# Patient Record
Sex: FEMALE | Race: WHITE | NOT HISPANIC OR LATINO | Employment: PART TIME | URBAN - NONMETROPOLITAN AREA
[De-identification: names, ages, dates, MRNs, and addresses within clinical notes are randomized per-mention and may not be internally consistent; named-entity substitution may affect disease eponyms.]

---

## 2017-01-17 ENCOUNTER — OFFICE VISIT (OUTPATIENT)
Dept: OBSTETRICS AND GYNECOLOGY | Facility: CLINIC | Age: 22
End: 2017-01-17

## 2017-01-17 ENCOUNTER — PROCEDURE VISIT (OUTPATIENT)
Dept: OBSTETRICS AND GYNECOLOGY | Facility: CLINIC | Age: 22
End: 2017-01-17

## 2017-01-17 VITALS
SYSTOLIC BLOOD PRESSURE: 100 MMHG | DIASTOLIC BLOOD PRESSURE: 74 MMHG | WEIGHT: 119 LBS | HEIGHT: 64 IN | BODY MASS INDEX: 20.32 KG/M2

## 2017-01-17 DIAGNOSIS — F17.200 SMOKER: ICD-10-CM

## 2017-01-17 DIAGNOSIS — O03.9 SAB (SPONTANEOUS ABORTION): Primary | ICD-10-CM

## 2017-01-17 DIAGNOSIS — Z67.91 RH NEGATIVE STATUS DURING PREGNANCY, FIRST TRIMESTER: ICD-10-CM

## 2017-01-17 DIAGNOSIS — O26.891 RH NEGATIVE STATUS DURING PREGNANCY, FIRST TRIMESTER: ICD-10-CM

## 2017-01-17 DIAGNOSIS — O02.1 MISSED AB: Primary | ICD-10-CM

## 2017-01-17 LAB — HCG INTACT+B SERPL-ACNC: 219.04 MIU/ML (ref 0–10)

## 2017-01-17 PROCEDURE — 76817 TRANSVAGINAL US OBSTETRIC: CPT | Performed by: OBSTETRICS & GYNECOLOGY

## 2017-01-17 PROCEDURE — 96372 THER/PROPH/DIAG INJ SC/IM: CPT | Performed by: OBSTETRICS & GYNECOLOGY

## 2017-01-17 PROCEDURE — 99203 OFFICE O/P NEW LOW 30 MIN: CPT | Performed by: OBSTETRICS & GYNECOLOGY

## 2017-01-17 NOTE — MR AVS SNAPSHOT
Urbano Pineda   2017 10:30 AM   Appointment    Dept Phone:  [phone]   Encounter #:  [id number]    Provider:  ULTRASOUND 1 DOC DYE   Department:  [facility]                Your Full Care Plan              Your Updated Medication List      Notice  As of 2017 10:43 AM    You have not been prescribed any medications.            Instructions     None    Patient Instructions History      Upcoming Appointments     Visit Type Date Time Department    [facility] 2017 10:30 [facility]    [facility] 2017 10:45 AM [facility]      [employer] Signup     [facility] RevolutionCredit allows you to send messages to your doctor, view your test results, renew your prescriptions, schedule appointments, and more. To sign up, go to Conversion Sound and click on the Sign Up Now link in the New User? box. Enter your Revolution[employer] Activation Code exactly as it appears below along with the last four digits of your Social Security Number and your Date of Birth () to complete the sign-up process. If you do not sign up before the expiration date, you must request a new code.    RevolutionCredit Activation Code: [email or web address]  Expires: 2017 10:43 AM    If you have questions, you can email [email or web address] or call [phone] to talk to our Revolution[employer] staff. Remember, RevolutionCredit is NOT to be used for urgent needs. For medical emergencies, dial 911.               Other Info from Your Visit           Your Appointments     2017 10:45 AM CST   NEW OBSTETRICS with Lucas Mckenzie MD   [facility] (--)    [address] [place] [address] [address]  [facility] [id number] [phone]              Allergies     Not on File

## 2017-01-17 NOTE — MR AVS SNAPSHOT
Urbano Pineda   2017 10:45 AM   Office Visit    Dept Phone:  [phone]   Encounter #:  [id number]    Provider:  Lucas Mckenzie MD   Department:  [facility]                Your Full Care Plan              Your Updated Medication List      Notice  As of 2017 11:36 AM    You have not been prescribed any medications.            We Performed the Following     ABO / Rh     Antibody Screen     CBC & Differential     HCG, B-subunit, Quantitative     Hepatitis B Surface Antigen     HIV-1 / O / 2 Ag / Antibody 4th Generation     Rhogam Immune Globulin Immunization     RPR     Rubella Antibody, IgG       You Were Diagnosed With        Codes Comments    SAB (spontaneous )    -  Primary ICD-10-CM: O03.9  ICD-9-CM: 634.90 Will follow HCG    Rh negative status during pregnancy, first trimester     ICD-10-CM: O09.891  ICD-9-CM: V23.89     Smoker     ICD-10-CM: F17.200  ICD-9-CM: 305.1       Instructions     None    Patient Instructions History      Upcoming Appointments     Visit Type Date Time Department    OB ULTRASOUND 2017 10:30 AM [facility]    NEW GYNECOLOGICAL 2017 10:45 AM [facility]    OFFICE VISIT 2017  1:15 PM [facility]      Social IQ (Social Influence Quotient) Signup     [facility] Social IQ (Social Influence Quotient) allows you to send messages to your doctor, view your test results, renew your prescriptions, schedule appointments, and more. To sign up, go to [employer] and click on the Sign Up Now link in the New User? box. Enter your Social IQ (Social Influence Quotient) Activation Code exactly as it appears below along with the last four digits of your Social Security Number and your Date of Birth () to complete the sign-up process. If you do not sign up before the expiration date, you must request a new code.    Social IQ (Social Influence Quotient) Activation Code: [email or web address]  Expires: 2017 10:43 AM    If you have questions, you can email [email or web address] or call [phone]  "to talk to our MyChart staff. Remember, MyChart is NOT to be used for urgent needs. For medical emergencies, dial 911.               Other Info from Your Visit           Your Appointments     Jan 31, 2017  1:15 PM CST   Office Visit with Lucas Mckenzie MD   [facility] (--)    [address] [address] [phone]           Arrive 15 minutes prior to appointment.              Allergies     No Known Allergies      Reason for Visit     Transitional Care Management New pt. Was scheduled as a NOB. No IUP noted on u/s report.      Vital Signs     Blood Pressure Height Weight Last Menstrual Period Breastfeeding? Body Mass Index    100/74 64\" (162.6 cm) 119 lb (54 kg) 11/14/2016 (Approximate) No 20.43 kg/m2    Smoking Status                   Current Every Day Smoker           Problems and Diagnoses Noted     Miscarriage    -  Primary    Rh negative status during pregnancy        Smoker          Immunizations Administered     Name Date    Rho (D) Immune Globulin         "

## 2017-01-17 NOTE — PROGRESS NOTES
Subjective   Urbano Pineda is a 21 y.o. female is here today as a self referral.  Had LMP in mid November.  12-12-16 took home UPT which was positive.  Went to PCP on 12-13 with confirmation blood work.  No issues until this weekend when she started bleeding heavily with passage of clots and tissue which has decreased.  Some cramping but midline in location and not sharp or severe.  Did not receive Rhogam this weekend. Known to be Rh -.  U/S today shows normal uterus and empty uterus.    History of Present Illness    The following portions of the patient's history were reviewed and updated as appropriate: allergies, current medications, past family history, past medical history, past social history, past surgical history and problem list.    Review of Systems   Constitutional: Negative for fever and unexpected weight change.   HENT: Negative for postnasal drip, rhinorrhea and sinus pressure.    Eyes: Negative for photophobia and visual disturbance.   Respiratory: Negative for cough, choking and chest tightness.    Cardiovascular: Negative for chest pain and leg swelling.   Gastrointestinal: Negative for constipation, diarrhea, nausea and vomiting.   Endocrine: Negative for cold intolerance and heat intolerance.   Genitourinary: Positive for vaginal bleeding. Negative for menstrual problem and pelvic pain.   Musculoskeletal: Negative for back pain and gait problem.   Neurological: Negative for light-headedness, numbness and headaches.   Psychiatric/Behavioral: Negative for decreased concentration and dysphoric mood.       Objective   Physical Exam   Constitutional: She appears well-developed and well-nourished.   Neck: Normal range of motion. Neck supple.   Cardiovascular: Normal rate.    Pulmonary/Chest: Effort normal.   Abdominal: Soft.   Skin: Skin is warm and dry.   Psychiatric: She has a normal mood and affect. Her behavior is normal. Judgment and thought content normal.   Vitals  reviewed.        Assessment/Plan   Urbano was seen today for transitional care management.    Diagnoses and all orders for this visit:    SAB (spontaneous )  Comments:  Will follow HCG  Orders:  -     ABO / Rh  -     Antibody Screen  -     CBC & Differential  -     Hepatitis B Surface Antigen  -     HIV-1 / O / 2 Ag / Antibody 4th Generation  -     RPR  -     Rubella Antibody, IgG  -     HCG, B-subunit, Quantitative    Rh negative status during pregnancy, first trimester  -     Rhogam Immune Globulin Immunization    Smoker      Lucas Mckenzie MD

## 2017-01-18 DIAGNOSIS — Z34.91 FIRST TRIMESTER PREGNANCY: Primary | ICD-10-CM

## 2017-01-18 LAB
ABO GROUP BLD: NORMAL
BASOPHILS # BLD AUTO: 0 X10E3/UL (ref 0–0.2)
BASOPHILS NFR BLD AUTO: 1 %
BLD GP AB SCN SERPL QL: NEGATIVE
EOSINOPHIL # BLD AUTO: 0.2 X10E3/UL (ref 0–0.4)
EOSINOPHIL NFR BLD AUTO: 4 %
ERYTHROCYTE [DISTWIDTH] IN BLOOD BY AUTOMATED COUNT: 13.6 % (ref 12.3–15.4)
HBV SURFACE AG SERPL QL IA: NEGATIVE
HCT VFR BLD AUTO: 40.7 % (ref 34–46.6)
HGB BLD-MCNC: 13.7 G/DL (ref 11.1–15.9)
HIV 1+2 AB+HIV1 P24 AG SERPL QL IA: NON REACTIVE
IMM GRANULOCYTES # BLD: 0 X10E3/UL (ref 0–0.1)
IMM GRANULOCYTES NFR BLD: 0 %
LYMPHOCYTES # BLD AUTO: 2.8 X10E3/UL (ref 0.7–3.1)
LYMPHOCYTES NFR BLD AUTO: 44 %
MCH RBC QN AUTO: 29.8 PG (ref 26.6–33)
MCHC RBC AUTO-ENTMCNC: 33.7 G/DL (ref 31.5–35.7)
MCV RBC AUTO: 89 FL (ref 79–97)
MONOCYTES # BLD AUTO: 0.5 X10E3/UL (ref 0.1–0.9)
MONOCYTES NFR BLD AUTO: 9 %
NEUTROPHILS # BLD AUTO: 2.6 X10E3/UL (ref 1.4–7)
NEUTROPHILS NFR BLD AUTO: 42 %
PLATELET # BLD AUTO: 245 X10E3/UL (ref 150–379)
RBC # BLD AUTO: 4.59 X10E6/UL (ref 3.77–5.28)
RH BLD: NEGATIVE
RPR SER QL: NON REACTIVE
RUBV IGG SERPL IA-ACNC: 1.45 INDEX
WBC # BLD AUTO: 6.2 X10E3/UL (ref 3.4–10.8)

## 2017-01-19 ENCOUNTER — RESULTS ENCOUNTER (OUTPATIENT)
Dept: OBSTETRICS AND GYNECOLOGY | Facility: CLINIC | Age: 22
End: 2017-01-19

## 2017-01-19 DIAGNOSIS — Z34.91 FIRST TRIMESTER PREGNANCY: ICD-10-CM

## 2017-01-31 ENCOUNTER — OFFICE VISIT (OUTPATIENT)
Dept: OBSTETRICS AND GYNECOLOGY | Facility: CLINIC | Age: 22
End: 2017-01-31

## 2017-01-31 VITALS
WEIGHT: 120 LBS | DIASTOLIC BLOOD PRESSURE: 80 MMHG | SYSTOLIC BLOOD PRESSURE: 100 MMHG | BODY MASS INDEX: 20.49 KG/M2 | HEIGHT: 64 IN

## 2017-01-31 DIAGNOSIS — O26.891 RH NEGATIVE STATUS DURING PREGNANCY IN FIRST TRIMESTER, ANTEPARTUM: ICD-10-CM

## 2017-01-31 DIAGNOSIS — O03.9 SAB (SPONTANEOUS ABORTION): Primary | ICD-10-CM

## 2017-01-31 DIAGNOSIS — Z67.91 RH NEGATIVE STATUS DURING PREGNANCY IN FIRST TRIMESTER, ANTEPARTUM: ICD-10-CM

## 2017-01-31 DIAGNOSIS — O03.9 SPONTANEOUS ABORTION WITHOUT COMPLICATION: Primary | ICD-10-CM

## 2017-01-31 PROCEDURE — 99213 OFFICE O/P EST LOW 20 MIN: CPT | Performed by: OBSTETRICS & GYNECOLOGY

## 2017-01-31 NOTE — PROGRESS NOTES
Subjective   Urbano Pineda is a 21 y.o. female is here today for follow-up.  Had 7-10 day s of bleeding but has now resolved.  Passed some clots and tissue.  No fevers    Miscarriage   This is a new problem. The current episode started more than 1 month ago. The problem occurs intermittently. The problem has been resolved. Pertinent negatives include no abdominal pain, anorexia, arthralgias, change in bowel habit, chest pain, chills, congestion, coughing, diaphoresis, fatigue, fever, headaches, joint swelling, myalgias, nausea, neck pain, numbness, rash, sore throat, swollen glands, urinary symptoms, vertigo, visual change, vomiting or weakness. Nothing aggravates the symptoms. She has tried nothing for the symptoms. The treatment provided significant relief.       The following portions of the patient's history were reviewed and updated as appropriate: allergies, current medications, past family history, past medical history, past social history, past surgical history and problem list.    Review of Systems   Constitutional: Negative for chills, diaphoresis, fatigue and fever.   HENT: Negative for congestion and sore throat.    Eyes: Negative for photophobia and visual disturbance.   Respiratory: Negative for cough.    Cardiovascular: Negative for chest pain.   Gastrointestinal: Negative for abdominal pain, anorexia, change in bowel habit, nausea and vomiting.   Genitourinary: Negative for pelvic pain and vaginal bleeding.   Musculoskeletal: Negative for arthralgias, joint swelling, myalgias and neck pain.   Skin: Negative for rash.   Neurological: Negative for vertigo, weakness, numbness and headaches.   Psychiatric/Behavioral: Negative for confusion, decreased concentration and dysphoric mood.       Objective   Physical Exam   Constitutional: She is oriented to person, place, and time. She appears well-developed and well-nourished.   Neck: Normal range of motion. Neck supple.   Cardiovascular: Normal rate.     Pulmonary/Chest: Effort normal.   Abdominal: Soft.   Neurological: She is alert and oriented to person, place, and time.   Skin: Skin is warm and dry.   Psychiatric: She has a normal mood and affect. Her behavior is normal. Judgment and thought content normal.   Nursing note and vitals reviewed.        Assessment/Plan   Urbano was seen today for miscarriage.    Diagnoses and all orders for this visit:    SAB (spontaneous )  -     hCG, Quantitative, Pregnancy    Rh negative status during pregnancy in first trimester, antepartum  Comments:  Received rhogam at last appt        Lucas Mckenzie MD

## 2017-01-31 NOTE — MR AVS SNAPSHOT
Urbano Pineda   2017 1:15 PM   Office Visit    Dept Phone:  [phone]   Encounter #:  [id number]    Provider:  Lucas Mckenzie MD   Department:  [facility]                Your Full Care Plan              Your Updated Medication List      Notice  As of 2017  1:33 PM    You have not been prescribed any medications.            We Performed the Following     hCG, Quantitative, Pregnancy       You Were Diagnosed With        Codes Comments    SAB (spontaneous )    -  Primary ICD-10-CM: O03.9  ICD-9-CM: 634.90     Rh negative status during pregnancy in first trimester, antepartum     ICD-10-CM: O09.891  ICD-9-CM: V23.89 Received rhogam at last appt      Instructions     None    Patient Instructions History      Upcoming Appointments     Visit Type Date Time Department    OFFICE VISIT 2017  1:15 PM [facility]    OFFICE VISIT 2017 11:00 AM [facility]      [facility] Signup     Our records indicate that you have an active [employer] account.    You can view your After Visit Summary by going to Elastic Intelligence and logging in with your [employer] username and password.  If you don't have a [employer] username and password but a parent or guardian has access to your record, the parent or guardian should login with their own [employer] username and password and access your record to view the After Visit Summary.    If you have questions, you can email [email or web address] or call [phone] to talk to our [employer] staff.  Remember, [employer] is NOT to be used for urgent needs.  For medical emergencies, dial 911.               Other Info from Your Visit           Your Appointments     2017 11:00 AM CDT   Office Visit with Lucas Mckenzie MD   [facility] (--)    [address] [address]   [phone]           Arrive 15 minutes prior to appointment.               "  Allergies     No Known Allergies      Reason for Visit     Miscarriage Pt has stopped bleeding and cramping      Vital Signs     Blood Pressure Height Weight Last Menstrual Period Breastfeeding? Body Mass Index    100/80 64\" (162.6 cm) 120 lb (54.4 kg) 11/14/2016 (Approximate) No 20.6 kg/m2    Smoking Status                   Current Every Day Smoker           Problems and Diagnoses Noted     Miscarriage    -  Primary    Rh negative status during pregnancy in first trimester, antepartum            "

## 2017-02-01 LAB — HCG INTACT+B SERPL-ACNC: 2.74 MIU/ML (ref 0–10)

## 2017-04-26 ENCOUNTER — OFFICE VISIT (OUTPATIENT)
Dept: OBSTETRICS AND GYNECOLOGY | Facility: CLINIC | Age: 22
End: 2017-04-26

## 2017-04-26 VITALS
WEIGHT: 121 LBS | BODY MASS INDEX: 20.66 KG/M2 | DIASTOLIC BLOOD PRESSURE: 70 MMHG | HEIGHT: 64 IN | SYSTOLIC BLOOD PRESSURE: 100 MMHG

## 2017-04-26 DIAGNOSIS — F17.200 SMOKER: Primary | ICD-10-CM

## 2017-04-26 DIAGNOSIS — Z31.69 ENCOUNTER FOR PRECONCEPTION CONSULTATION: ICD-10-CM

## 2017-04-26 PROCEDURE — 99213 OFFICE O/P EST LOW 20 MIN: CPT | Performed by: OBSTETRICS & GYNECOLOGY

## 2017-04-26 NOTE — PROGRESS NOTES
Subjective   Urbano Pineda is a 21 y.o. female is here today for follow-up.  Had SAB without D&C 3 months ago.  Menses regular and not problematic.  Continues to smoke.  Not taking PNV as advised.  Desires pregnancy    Menstrual Problem   This is a chronic problem. The current episode started more than 1 year ago. The problem occurs intermittently. The problem has been unchanged. Pertinent negatives include no abdominal pain, anorexia, arthralgias, change in bowel habit, chest pain, chills, congestion, coughing, diaphoresis, fatigue, fever, headaches, joint swelling, myalgias, nausea, neck pain, numbness, rash, sore throat, swollen glands, urinary symptoms, vertigo, visual change, vomiting or weakness. Nothing aggravates the symptoms. She has tried nothing for the symptoms. The treatment provided no relief.       The following portions of the patient's history were reviewed and updated as appropriate: allergies, current medications, past family history, past medical history, past social history, past surgical history and problem list.    Review of Systems   Constitutional: Negative for chills, diaphoresis, fatigue and fever.   HENT: Negative for congestion and sore throat.    Eyes: Negative for photophobia and visual disturbance.   Respiratory: Negative for cough.    Cardiovascular: Negative for chest pain.   Gastrointestinal: Negative for abdominal pain, anorexia, change in bowel habit, nausea and vomiting.   Endocrine: Negative for cold intolerance and heat intolerance.   Genitourinary: Positive for menstrual problem. Negative for dyspareunia and vaginal bleeding.   Musculoskeletal: Negative for arthralgias, joint swelling, myalgias and neck pain.   Skin: Negative for rash.   Neurological: Negative for vertigo, weakness, numbness and headaches.   Psychiatric/Behavioral: Negative for confusion, decreased concentration and dysphoric mood.       Objective   Physical Exam   Constitutional: She is oriented to person,  place, and time. She appears well-developed and well-nourished.   HENT:   Head: Normocephalic and atraumatic.   Neck: Normal range of motion. Neck supple. No tracheal deviation present. No thyromegaly present.   Cardiovascular: Normal rate and regular rhythm.    Pulmonary/Chest: Effort normal and breath sounds normal.   Abdominal: Soft. Bowel sounds are normal. She exhibits no distension. There is no tenderness.   Musculoskeletal: Normal range of motion.   Neurological: She is alert and oriented to person, place, and time.   Skin: Skin is warm and dry.   Psychiatric: She has a normal mood and affect. Her behavior is normal. Judgment and thought content normal.   Nursing note and vitals reviewed.        Assessment/Plan   Urbano was seen today for follow-up.    Diagnoses and all orders for this visit:    Smoker    Encounter for preconception consultation  Comments:  recommend ovulation predictor kit timed intercourse.  Encouraged PNV.

## 2017-07-07 ENCOUNTER — INITIAL PRENATAL (OUTPATIENT)
Dept: OBSTETRICS AND GYNECOLOGY | Facility: CLINIC | Age: 22
End: 2017-07-07

## 2017-07-07 ENCOUNTER — APPOINTMENT (OUTPATIENT)
Dept: LAB | Facility: HOSPITAL | Age: 22
End: 2017-07-07

## 2017-07-07 VITALS — WEIGHT: 119 LBS | DIASTOLIC BLOOD PRESSURE: 52 MMHG | SYSTOLIC BLOOD PRESSURE: 104 MMHG | BODY MASS INDEX: 20.43 KG/M2

## 2017-07-07 DIAGNOSIS — Z3A.00 WEEKS OF GESTATION OF PREGNANCY NOT SPECIFIED: ICD-10-CM

## 2017-07-07 DIAGNOSIS — O99.331 TOBACCO USE IN PREGNANCY, ANTEPARTUM, FIRST TRIMESTER: ICD-10-CM

## 2017-07-07 DIAGNOSIS — O09.11: Primary | ICD-10-CM

## 2017-07-07 DIAGNOSIS — Z32.01 PREGNANCY EXAMINATION OR TEST, POSITIVE RESULT: ICD-10-CM

## 2017-07-07 PROBLEM — O99.330 TOBACCO USE IN PREGNANCY, ANTEPARTUM: Status: ACTIVE | Noted: 2017-07-07

## 2017-07-07 LAB
ABO GROUP BLD: NORMAL
AMPHET+METHAMPHET UR QL: NEGATIVE
BACTERIA UR QL AUTO: ABNORMAL /HPF
BARBITURATES UR QL SCN: NEGATIVE
BASOPHILS # BLD AUTO: 0.03 10*3/MM3 (ref 0–0.2)
BASOPHILS NFR BLD AUTO: 0.5 % (ref 0–2)
BENZODIAZ UR QL SCN: NEGATIVE
BILIRUB UR QL STRIP: NEGATIVE
BLD GP AB SCN SERPL QL: NEGATIVE
CANNABINOIDS SERPL QL: NEGATIVE
CLARITY UR: ABNORMAL
COCAINE UR QL: NEGATIVE
COLOR UR: YELLOW
DEPRECATED RDW RBC AUTO: 40.4 FL (ref 36.4–46.3)
EOSINOPHIL # BLD AUTO: 0.08 10*3/MM3 (ref 0–0.7)
EOSINOPHIL NFR BLD AUTO: 1.3 % (ref 0–7)
ERYTHROCYTE [DISTWIDTH] IN BLOOD BY AUTOMATED COUNT: 12.5 % (ref 11.5–14.5)
GLUCOSE UR STRIP-MCNC: NEGATIVE MG/DL
HBV SURFACE AG SERPL QL IA: NEGATIVE
HCG INTACT+B SERPL-ACNC: 1603.4 MIU/ML
HCT VFR BLD AUTO: 40 % (ref 35–45)
HCV AB SER DONR QL: NEGATIVE
HGB BLD-MCNC: 13.7 G/DL (ref 12–15.5)
HGB UR QL STRIP.AUTO: NEGATIVE
HIV1+2 AB SER QL: NEGATIVE
HYALINE CASTS UR QL AUTO: ABNORMAL /LPF
IMM GRANULOCYTES # BLD: 0.01 10*3/MM3 (ref 0–0.02)
IMM GRANULOCYTES NFR BLD: 0.2 % (ref 0–0.5)
KETONES UR QL STRIP: NEGATIVE
LEUKOCYTE ESTERASE UR QL STRIP.AUTO: ABNORMAL
LYMPHOCYTES # BLD AUTO: 2 10*3/MM3 (ref 0.6–4.2)
LYMPHOCYTES NFR BLD AUTO: 31.7 % (ref 10–50)
Lab: NORMAL
MCH RBC QN AUTO: 29.9 PG (ref 26.5–34)
MCHC RBC AUTO-ENTMCNC: 34.3 G/DL (ref 31.4–36)
MCV RBC AUTO: 87.3 FL (ref 80–98)
METHADONE UR QL SCN: NEGATIVE
MONOCYTES # BLD AUTO: 0.51 10*3/MM3 (ref 0–0.9)
MONOCYTES NFR BLD AUTO: 8.1 % (ref 0–12)
NEUTROPHILS # BLD AUTO: 3.67 10*3/MM3 (ref 2–8.6)
NEUTROPHILS NFR BLD AUTO: 58.2 % (ref 37–80)
NITRITE UR QL STRIP: POSITIVE
OPIATES UR QL: NEGATIVE
OXYCODONE UR QL SCN: NEGATIVE
PH UR STRIP.AUTO: 5.5 [PH] (ref 5–9)
PLATELET # BLD AUTO: 212 10*3/MM3 (ref 150–450)
PMV BLD AUTO: 10.9 FL (ref 8–12)
PROT UR QL STRIP: NEGATIVE
RBC # BLD AUTO: 4.58 10*6/MM3 (ref 3.77–5.16)
RBC # UR: ABNORMAL /HPF
REF LAB TEST METHOD: ABNORMAL
RH BLD: NEGATIVE
RUBV IGG SER QL: ABNORMAL
RUBV IGG SER-ACNC: 17.9 IU/ML (ref 0–9.9)
SP GR UR STRIP: 1.02 (ref 1–1.03)
SQUAMOUS #/AREA URNS HPF: ABNORMAL /HPF
UROBILINOGEN UR QL STRIP: ABNORMAL
WBC NRBC COR # BLD: 6.3 10*3/MM3 (ref 3.2–9.8)
WBC UR QL AUTO: ABNORMAL /HPF

## 2017-07-07 PROCEDURE — 87077 CULTURE AEROBIC IDENTIFY: CPT | Performed by: ADVANCED PRACTICE MIDWIFE

## 2017-07-07 PROCEDURE — 87186 SC STD MICRODIL/AGAR DIL: CPT | Performed by: ADVANCED PRACTICE MIDWIFE

## 2017-07-07 PROCEDURE — 80307 DRUG TEST PRSMV CHEM ANLYZR: CPT | Performed by: ADVANCED PRACTICE MIDWIFE

## 2017-07-07 PROCEDURE — 86900 BLOOD TYPING SEROLOGIC ABO: CPT | Performed by: ADVANCED PRACTICE MIDWIFE

## 2017-07-07 PROCEDURE — 84702 CHORIONIC GONADOTROPIN TEST: CPT | Performed by: ADVANCED PRACTICE MIDWIFE

## 2017-07-07 PROCEDURE — 87340 HEPATITIS B SURFACE AG IA: CPT | Performed by: ADVANCED PRACTICE MIDWIFE

## 2017-07-07 PROCEDURE — 81001 URINALYSIS AUTO W/SCOPE: CPT | Performed by: ADVANCED PRACTICE MIDWIFE

## 2017-07-07 PROCEDURE — G0432 EIA HIV-1/HIV-2 SCREEN: HCPCS | Performed by: ADVANCED PRACTICE MIDWIFE

## 2017-07-07 PROCEDURE — 85025 COMPLETE CBC W/AUTO DIFF WBC: CPT | Performed by: ADVANCED PRACTICE MIDWIFE

## 2017-07-07 PROCEDURE — 87086 URINE CULTURE/COLONY COUNT: CPT | Performed by: ADVANCED PRACTICE MIDWIFE

## 2017-07-07 PROCEDURE — 87491 CHLMYD TRACH DNA AMP PROBE: CPT | Performed by: ADVANCED PRACTICE MIDWIFE

## 2017-07-07 PROCEDURE — 0501F PRENATAL FLOW SHEET: CPT | Performed by: ADVANCED PRACTICE MIDWIFE

## 2017-07-07 PROCEDURE — 86901 BLOOD TYPING SEROLOGIC RH(D): CPT | Performed by: ADVANCED PRACTICE MIDWIFE

## 2017-07-07 PROCEDURE — 87591 N.GONORRHOEAE DNA AMP PROB: CPT | Performed by: ADVANCED PRACTICE MIDWIFE

## 2017-07-07 PROCEDURE — 86850 RBC ANTIBODY SCREEN: CPT | Performed by: ADVANCED PRACTICE MIDWIFE

## 2017-07-07 PROCEDURE — 86803 HEPATITIS C AB TEST: CPT | Performed by: ADVANCED PRACTICE MIDWIFE

## 2017-07-07 PROCEDURE — 36415 COLL VENOUS BLD VENIPUNCTURE: CPT | Performed by: ADVANCED PRACTICE MIDWIFE

## 2017-07-07 RX ORDER — PRENATAL VIT/IRON FUM/FOLIC AC 65 MG-1 MG
1 TABLET ORAL DAILY
Qty: 30 EACH | Refills: 12 | Status: SHIPPED | OUTPATIENT
Start: 2017-07-07 | End: 2017-09-27 | Stop reason: SDUPTHER

## 2017-07-07 NOTE — PROGRESS NOTES
CC: new OB visit, history obtained and reviewed see history tabs.     ROS:Positive occasional nausea   Negative leaking fluid from the vagina, swelling in her legs, headache, visual changes, low back pain, heartburn     Educated on:Spent 30 minutes out of 45 minutes face to face counseling on nutrition, activity, diet, safety, prenatal care, medications approved in pregnancy, pregnancy discomforts, and testing. Counseled on quitting smoking, reviewed s/s of miscarriage and ectopic pregnancy. Patient verbalized understanding       Plan: f/u in 3 week/s with dating US

## 2017-07-09 LAB
BACTERIA SPEC AEROBE CULT: ABNORMAL
C TRACH RRNA CVX QL NAA+PROBE: NOT DETECTED
N GONORRHOEA RRNA SPEC QL NAA+PROBE: NOT DETECTED
RPR SER QL: NORMAL

## 2017-07-09 RX ORDER — CEPHALEXIN 250 MG/1
250 CAPSULE ORAL 4 TIMES DAILY
Qty: 28 CAPSULE | Refills: 0 | Status: SHIPPED | OUTPATIENT
Start: 2017-07-09 | End: 2017-07-16

## 2017-07-31 ENCOUNTER — ROUTINE PRENATAL (OUTPATIENT)
Dept: OBSTETRICS AND GYNECOLOGY | Facility: CLINIC | Age: 22
End: 2017-07-31

## 2017-07-31 VITALS — SYSTOLIC BLOOD PRESSURE: 112 MMHG | BODY MASS INDEX: 19.91 KG/M2 | DIASTOLIC BLOOD PRESSURE: 61 MMHG | WEIGHT: 116 LBS

## 2017-07-31 DIAGNOSIS — Z3A.00 WEEKS OF GESTATION OF PREGNANCY NOT SPECIFIED: ICD-10-CM

## 2017-07-31 DIAGNOSIS — Z34.81 PRENATAL CARE, SUBSEQUENT PREGNANCY, FIRST TRIMESTER: Primary | ICD-10-CM

## 2017-07-31 LAB
BILIRUB UR QL STRIP: NEGATIVE
CLARITY UR: ABNORMAL
COLOR UR: YELLOW
GLUCOSE UR STRIP-MCNC: NEGATIVE MG/DL
HGB UR QL STRIP.AUTO: NEGATIVE
KETONES UR QL STRIP: NEGATIVE
LEUKOCYTE ESTERASE UR QL STRIP.AUTO: NEGATIVE
NITRITE UR QL STRIP: NEGATIVE
PH UR STRIP.AUTO: 8 [PH] (ref 5–9)
PROT UR QL STRIP: NEGATIVE
SP GR UR STRIP: 1.02 (ref 1–1.03)
UROBILINOGEN UR QL STRIP: ABNORMAL

## 2017-07-31 PROCEDURE — 81003 URINALYSIS AUTO W/O SCOPE: CPT | Performed by: ADVANCED PRACTICE MIDWIFE

## 2017-07-31 PROCEDURE — 87086 URINE CULTURE/COLONY COUNT: CPT | Performed by: ADVANCED PRACTICE MIDWIFE

## 2017-07-31 PROCEDURE — 0502F SUBSEQUENT PRENATAL CARE: CPT | Performed by: ADVANCED PRACTICE MIDWIFE

## 2017-07-31 RX ORDER — ONDANSETRON 4 MG/1
4 TABLET, FILM COATED ORAL EVERY 8 HOURS PRN
Qty: 20 TABLET | Refills: 3 | Status: SHIPPED | OUTPATIENT
Start: 2017-07-31 | End: 2017-08-30

## 2017-07-31 NOTE — PROGRESS NOTES
CC: LAURA visit, history reviewed see history tabs.     ROS:Positive nausea   Negative leaking fluid from the vagina, swelling in her legs, headache, visual changes, low back pain and heartburn    Educated on:prenatal care, reviewed labs and US, zofran rx sent to pharmacy, educated on nutrition    Plan: f/u in 4 week/s   YANETH- urine collected

## 2017-08-02 LAB — BACTERIA SPEC AEROBE CULT: NORMAL

## 2017-08-30 ENCOUNTER — ROUTINE PRENATAL (OUTPATIENT)
Dept: OBSTETRICS AND GYNECOLOGY | Facility: CLINIC | Age: 22
End: 2017-08-30

## 2017-08-30 VITALS — WEIGHT: 115 LBS | BODY MASS INDEX: 19.74 KG/M2 | DIASTOLIC BLOOD PRESSURE: 65 MMHG | SYSTOLIC BLOOD PRESSURE: 103 MMHG

## 2017-08-30 DIAGNOSIS — Z36.89 ENCOUNTER FOR FETAL ANATOMIC SURVEY: ICD-10-CM

## 2017-08-30 DIAGNOSIS — O09.11: ICD-10-CM

## 2017-08-30 DIAGNOSIS — Z3A.13 13 WEEKS GESTATION OF PREGNANCY: ICD-10-CM

## 2017-08-30 DIAGNOSIS — O99.331 TOBACCO USE IN PREGNANCY, ANTEPARTUM, FIRST TRIMESTER: Primary | ICD-10-CM

## 2017-08-30 LAB
BILIRUB UR QL STRIP: NEGATIVE
CLARITY UR: ABNORMAL
COLOR UR: YELLOW
GLUCOSE UR STRIP-MCNC: NEGATIVE MG/DL
HGB UR QL STRIP.AUTO: NEGATIVE
KETONES UR QL STRIP: NEGATIVE
LEUKOCYTE ESTERASE UR QL STRIP.AUTO: NEGATIVE
NITRITE UR QL STRIP: NEGATIVE
PH UR STRIP.AUTO: 6.5 [PH] (ref 5–9)
PROT UR QL STRIP: NEGATIVE
SP GR UR STRIP: 1.03 (ref 1–1.03)
UROBILINOGEN UR QL STRIP: ABNORMAL

## 2017-08-30 PROCEDURE — 87086 URINE CULTURE/COLONY COUNT: CPT | Performed by: ADVANCED PRACTICE MIDWIFE

## 2017-08-30 PROCEDURE — 0502F SUBSEQUENT PRENATAL CARE: CPT | Performed by: ADVANCED PRACTICE MIDWIFE

## 2017-08-30 PROCEDURE — 81003 URINALYSIS AUTO W/O SCOPE: CPT | Performed by: ADVANCED PRACTICE MIDWIFE

## 2017-08-30 NOTE — PROGRESS NOTES
CC: LAURA visit, history reviewed see history tabs.     ROS: Negative leaking fluid from the vagina, swelling in her legs, headache, visual changes, low back pain and heartburn      Educated on:POC     A/Plan: f/u in 5 week/s   Urbano was seen today for routine prenatal visit.    Diagnoses and all orders for this visit:    Tobacco use in pregnancy, antepartum, first trimester    Pregnancy in first trimester with history of ectopic pregnancy, antepartum    13 weeks gestation of pregnancy

## 2017-08-31 LAB — BACTERIA SPEC AEROBE CULT: NORMAL

## 2017-09-25 ENCOUNTER — TELEPHONE (OUTPATIENT)
Dept: OBSTETRICS AND GYNECOLOGY | Facility: CLINIC | Age: 22
End: 2017-09-25

## 2017-09-25 NOTE — TELEPHONE ENCOUNTER
Pt cancelled return OB appt with Farzaneh on 10/4 via Sputnik[employer] and has an appt scheduled on 9/27 with an APRN in [place] for OB care. Wanted to confirm the location since she's been seeing Farzaneh throughout her pregnancy. No voicemail set up for either number.

## 2017-09-27 ENCOUNTER — INITIAL PRENATAL (OUTPATIENT)
Dept: OBSTETRICS AND GYNECOLOGY | Facility: CLINIC | Age: 22
End: 2017-09-27

## 2017-09-27 VITALS — WEIGHT: 119 LBS | SYSTOLIC BLOOD PRESSURE: 96 MMHG | DIASTOLIC BLOOD PRESSURE: 54 MMHG | BODY MASS INDEX: 20.43 KG/M2

## 2017-09-27 DIAGNOSIS — Z34.82 ENCOUNTER FOR SUPERVISION OF OTHER NORMAL PREGNANCY IN SECOND TRIMESTER: Primary | ICD-10-CM

## 2017-09-27 PROCEDURE — 0501F PRENATAL FLOW SHEET: CPT | Performed by: NURSE PRACTITIONER

## 2017-09-27 RX ORDER — VITAMIN A ACETATE, .BETA.-CAROTENE, ASCORBIC ACID, CHOLECALCIFEROL, .ALPHA.-TOCOPHEROL ACETATE, DL-, THIAMINE MONONITRATE, RIBOFLAVIN, NIACINAMIDE, PYRIDOXINE HYDROCHLORIDE, FOLIC ACID, CYANOCOBALAMIN, CALCIUM CARBONATE, FERROUS FUMARATE, ZINC OXIDE, AND CUPRIC OXIDE 2000; 2000; 120; 400; 22; 1.84; 3; 20; 10; 1; 12; 200; 27; 25; 2 [IU]/1; [IU]/1; MG/1; [IU]/1; MG/1; MG/1; MG/1; MG/1; MG/1; MG/1; UG/1; MG/1; MG/1; MG/1; MG/1
TABLET ORAL
Refills: 12 | COMMUNITY
Start: 2017-07-07 | End: 2018-04-17

## 2017-09-27 NOTE — PROGRESS NOTES
This is patient's 3 rd pregnancy. She has had one miscarriage and one ectopic. She does smoke 1/2 ppd down from a ppd. This pregnancy has been uneventful. Will have G&D with next OV in 3 weeks  Has had intermittent low pelvic pain that radiates to her back occasionally. No problems with urination.

## 2017-09-27 NOTE — PATIENT INSTRUCTIONS
Second Trimester of Pregnancy  The second trimester is from week 13 through week 28, months 4 through 6. The second trimester is often a time when you feel your best. Your body has also adjusted to being pregnant, and you begin to feel better physically. Usually, morning sickness has lessened or quit completely, you may have more energy, and you may have an increase in appetite. The second trimester is also a time when the fetus is growing rapidly. At the end of the sixth month, the fetus is about 9 inches long and weighs about 1½ pounds. You will likely begin to feel the baby move (quickening) between 18 and 20 weeks of the pregnancy.  BODY CHANGES  Your body goes through many changes during pregnancy. The changes vary from woman to woman.   · Your weight will continue to increase. You will notice your lower abdomen bulging out.  · You may begin to get stretch marks on your hips, abdomen, and breasts.  · You may develop headaches that can be relieved by medicines approved by your health care provider.  · You may urinate more often because the fetus is pressing on your bladder.  · You may develop or continue to have heartburn as a result of your pregnancy.  · You may develop constipation because certain hormones are causing the muscles that push waste through your intestines to slow down.  · You may develop hemorrhoids or swollen, bulging veins (varicose veins).  · You may have back pain because of the weight gain and pregnancy hormones relaxing your joints between the bones in your pelvis and as a result of a shift in weight and the muscles that support your balance.  · Your breasts will continue to grow and be tender.  · Your gums may bleed and may be sensitive to brushing and flossing.  · Dark spots or blotches (chloasma, mask of pregnancy) may develop on your face. This will likely fade after the baby is born.  · A dark line from your belly button to the pubic area (linea nigra) may appear. This will likely fade  after the baby is born.  · You may have changes in your hair. These can include thickening of your hair, rapid growth, and changes in texture. Some women also have hair loss during or after pregnancy, or hair that feels dry or thin. Your hair will most likely return to normal after your baby is born.  WHAT TO EXPECT AT YOUR PRENATAL VISITS  During a routine prenatal visit:  · You will be weighed to make sure you and the fetus are growing normally.  · Your blood pressure will be taken.  · Your abdomen will be measured to track your baby's growth.  · The fetal heartbeat will be listened to.  · Any test results from the previous visit will be discussed.  Your health care provider may ask you:  · How you are feeling.  · If you are feeling the baby move.  · If you have had any abnormal symptoms, such as leaking fluid, bleeding, severe headaches, or abdominal cramping.  · If you are using any tobacco products, including cigarettes, chewing tobacco, and electronic cigarettes.  · If you have any questions.  Other tests that may be performed during your second trimester include:  · Blood tests that check for:    Low iron levels (anemia).    Gestational diabetes (between 24 and 28 weeks).    Rh antibodies.  · Urine tests to check for infections, diabetes, or protein in the urine.  · An ultrasound to confirm the proper growth and development of the baby.  · An amniocentesis to check for possible genetic problems.  · Fetal screens for spina bifida and Down syndrome.  · HIV (human immunodeficiency virus) testing. Routine prenatal testing includes screening for HIV, unless you choose not to have this test.  HOME CARE INSTRUCTIONS   · Avoid all smoking, herbs, alcohol, and unprescribed drugs. These chemicals affect the formation and growth of the baby.  · Do not use any tobacco products, including cigarettes, chewing tobacco, and electronic cigarettes. If you need help quitting, ask your health care provider. You may receive  counseling support and other resources to help you quit.  · Follow your health care provider's instructions regarding medicine use. There are medicines that are either safe or unsafe to take during pregnancy.  · Exercise only as directed by your health care provider. Experiencing uterine cramps is a good sign to stop exercising.  · Continue to eat regular, healthy meals.  · Wear a good support bra for breast tenderness.  · Do not use hot tubs, steam rooms, or saunas.  · Wear your seat belt at all times when driving.  · Avoid raw meat, uncooked cheese, cat litter boxes, and soil used by cats. These carry germs that can cause birth defects in the baby.  · Take your prenatal vitamins.  · Take 6688-9145 mg of calcium daily starting at the 20th week of pregnancy until you deliver your baby.  · Try taking a stool softener (if your health care provider approves) if you develop constipation. Eat more high-fiber foods, such as fresh vegetables or fruit and whole grains. Drink plenty of fluids to keep your urine clear or pale yellow.  · Take warm sitz baths to soothe any pain or discomfort caused by hemorrhoids. Use hemorrhoid cream if your health care provider approves.  · If you develop varicose veins, wear support hose. Elevate your feet for 15 minutes, 3-4 times a day. Limit salt in your diet.  · Avoid heavy lifting, wear low heel shoes, and practice good posture.  · Rest with your legs elevated if you have leg cramps or low back pain.  · Visit your dentist if you have not gone yet during your pregnancy. Use a soft toothbrush to brush your teeth and be gentle when you floss.  · A sexual relationship may be continued unless your health care provider directs you otherwise.  · Continue to go to all your prenatal visits as directed by your health care provider.  SEEK MEDICAL CARE IF:   · You have dizziness.  · You have mild pelvic cramps, pelvic pressure, or nagging pain in the abdominal area.  · You have persistent nausea,  vomiting, or diarrhea.  · You have a bad smelling vaginal discharge.  · You have pain with urination.  SEEK IMMEDIATE MEDICAL CARE IF:   · You have a fever.  · You are leaking fluid from your vagina.  · You have spotting or bleeding from your vagina.  · You have severe abdominal cramping or pain.  · You have rapid weight gain or loss.  · You have shortness of breath with chest pain.  · You notice sudden or extreme swelling of your face, hands, ankles, feet, or legs.  · You have not felt your baby move in over an hour.  · You have severe headaches that do not go away with medicine.  · You have vision changes.     This information is not intended to replace advice given to you by your health care provider. Make sure you discuss any questions you have with your health care provider.     Document Released: 12/12/2002 Document Revised: 01/08/2016 Document Reviewed: 02/18/2014  [employer] Interactive Patient Education ©2017 [employer]

## 2017-10-18 ENCOUNTER — ROUTINE PRENATAL (OUTPATIENT)
Dept: OBSTETRICS AND GYNECOLOGY | Facility: CLINIC | Age: 22
End: 2017-10-18

## 2017-10-18 VITALS — BODY MASS INDEX: 20.6 KG/M2 | DIASTOLIC BLOOD PRESSURE: 58 MMHG | WEIGHT: 120 LBS | SYSTOLIC BLOOD PRESSURE: 98 MMHG

## 2017-10-18 DIAGNOSIS — Z3A.20 20 WEEKS GESTATION OF PREGNANCY: Primary | ICD-10-CM

## 2017-10-18 DIAGNOSIS — F17.200 SMOKER: ICD-10-CM

## 2017-10-18 PROCEDURE — 90656 IIV3 VACC NO PRSV 0.5 ML IM: CPT | Performed by: OBSTETRICS & GYNECOLOGY

## 2017-10-18 PROCEDURE — 90471 IMMUNIZATION ADMIN: CPT | Performed by: OBSTETRICS & GYNECOLOGY

## 2017-10-18 PROCEDURE — 0502F SUBSEQUENT PRENATAL CARE: CPT | Performed by: OBSTETRICS & GYNECOLOGY

## 2017-10-18 NOTE — PROGRESS NOTES
Patient started her PNC in [place], but transferred care because she started working here in [place].  Patient reports she is feeling well.  Baby is a BOY!  Patient denies any health problems.  She is taking PNV's.  Currently smoking at least 1/2 ppd.  Strongly encouraged patient to cut back.  MFM u/s Nov 1st.  Flu shot today.

## 2017-10-25 LAB
CFDNA.FET/CFDNA.TOTAL SFR FETUS: NORMAL %
CITATION REF LAB TEST: NORMAL
FET CHR 13 TS PLAS.CFDNA QL: NEGATIVE
FET CHR 13+18+21 TS PLAS.CFDNA QL: NORMAL
FET CHR 18 TS PLAS.CFDNA QL: NEGATIVE
FET CHR 21 TS PLAS.CFDNA QL: NEGATIVE
FET Y CHROM PLAS.CFDNA QL: DETECTED
FET Y CHROM PLAS.CFDNA: NORMAL
GENE ANALYSIS NARR RPT DOC: NORMAL
LAB DIRECTOR NAME PROVIDER: NORMAL
LABORATORY COMMENT REPORT: NORMAL
LIMITATIONS OF THE TEST: NORMAL
NOTE: NORMAL
REF LAB TEST METHOD: NORMAL
SERVICE CMNT 02-IMP: NORMAL
SERVICE CMNT 03-IMP: NORMAL
SERVICE CMNT-IMP: NORMAL
TEST PERFORMANCE INFO SPEC: NORMAL
TEST PERFORMANCE INFO SPEC: NORMAL

## 2017-11-15 ENCOUNTER — ROUTINE PRENATAL (OUTPATIENT)
Dept: OBSTETRICS AND GYNECOLOGY | Facility: CLINIC | Age: 22
End: 2017-11-15

## 2017-11-15 VITALS — DIASTOLIC BLOOD PRESSURE: 60 MMHG | WEIGHT: 128 LBS | BODY MASS INDEX: 21.97 KG/M2 | SYSTOLIC BLOOD PRESSURE: 106 MMHG

## 2017-11-15 DIAGNOSIS — Z34.82 ENCOUNTER FOR SUPERVISION OF OTHER NORMAL PREGNANCY IN SECOND TRIMESTER: ICD-10-CM

## 2017-11-15 DIAGNOSIS — Z87.891 FORMER SMOKER: Primary | ICD-10-CM

## 2017-11-15 PROCEDURE — 0502F SUBSEQUENT PRENATAL CARE: CPT | Performed by: OBSTETRICS & GYNECOLOGY

## 2017-11-15 NOTE — PROGRESS NOTES
N/V last night and today, which had previously been better for past several weeks.  Planning to refill her zofran.  No cramping or bleeding.  Good FM.  Weight gain ok considering how much she lost initially.  GCT, Rhogam and TDaP next visit.  Patient has not smoked in 5 days!

## 2017-12-13 ENCOUNTER — ROUTINE PRENATAL (OUTPATIENT)
Dept: OBSTETRICS AND GYNECOLOGY | Facility: CLINIC | Age: 22
End: 2017-12-13

## 2017-12-13 VITALS — DIASTOLIC BLOOD PRESSURE: 58 MMHG | SYSTOLIC BLOOD PRESSURE: 104 MMHG | BODY MASS INDEX: 25.06 KG/M2 | WEIGHT: 146 LBS

## 2017-12-13 DIAGNOSIS — Z87.891 FORMER SMOKER: ICD-10-CM

## 2017-12-13 DIAGNOSIS — Z3A.28 28 WEEKS GESTATION OF PREGNANCY: Primary | ICD-10-CM

## 2017-12-13 PROCEDURE — 96372 THER/PROPH/DIAG INJ SC/IM: CPT | Performed by: OBSTETRICS & GYNECOLOGY

## 2017-12-13 PROCEDURE — 99212 OFFICE O/P EST SF 10 MIN: CPT | Performed by: OBSTETRICS & GYNECOLOGY

## 2017-12-13 NOTE — PROGRESS NOTES
Good FM.  No concerns. Still not smoking!  GCT, Rhogam today.  Office out of TDaP, will give at next visit. 4D u/s at next visit.

## 2017-12-14 LAB
BLD GP AB SCN SERPL QL: NEGATIVE
GLUCOSE 1H P 50 G GLC PO SERPL-MCNC: 65 MG/DL (ref 70–140)
HGB BLD-MCNC: 10.2 G/DL (ref 12–16)

## 2017-12-27 ENCOUNTER — ROUTINE PRENATAL (OUTPATIENT)
Dept: OBSTETRICS AND GYNECOLOGY | Facility: CLINIC | Age: 22
End: 2017-12-27

## 2017-12-27 VITALS — WEIGHT: 141 LBS | SYSTOLIC BLOOD PRESSURE: 106 MMHG | DIASTOLIC BLOOD PRESSURE: 62 MMHG | BODY MASS INDEX: 24.2 KG/M2

## 2017-12-27 DIAGNOSIS — Z87.891 FORMER SMOKER: Primary | ICD-10-CM

## 2017-12-27 DIAGNOSIS — Z34.83 PRENATAL CARE, SUBSEQUENT PREGNANCY, THIRD TRIMESTER: ICD-10-CM

## 2017-12-27 PROCEDURE — 90715 TDAP VACCINE 7 YRS/> IM: CPT | Performed by: OBSTETRICS & GYNECOLOGY

## 2017-12-27 PROCEDURE — 90471 IMMUNIZATION ADMIN: CPT | Performed by: OBSTETRICS & GYNECOLOGY

## 2017-12-27 PROCEDURE — 0502F SUBSEQUENT PRENATAL CARE: CPT | Performed by: OBSTETRICS & GYNECOLOGY

## 2017-12-27 NOTE — PROGRESS NOTES
Good FM.  Occasional B-H contractions, not hurting today at all.  No VB or LOF, no swelling  TDaP today.

## 2018-01-10 ENCOUNTER — ROUTINE PRENATAL (OUTPATIENT)
Dept: OBSTETRICS AND GYNECOLOGY | Facility: CLINIC | Age: 23
End: 2018-01-10

## 2018-01-10 VITALS — DIASTOLIC BLOOD PRESSURE: 58 MMHG | SYSTOLIC BLOOD PRESSURE: 102 MMHG | WEIGHT: 145 LBS | BODY MASS INDEX: 24.89 KG/M2

## 2018-01-10 DIAGNOSIS — Z87.59 HISTORY OF MISCARRIAGE: ICD-10-CM

## 2018-01-10 DIAGNOSIS — O09.11: ICD-10-CM

## 2018-01-10 PROBLEM — O99.330 TOBACCO USE IN PREGNANCY, ANTEPARTUM: Status: RESOLVED | Noted: 2017-07-07 | Resolved: 2018-01-10

## 2018-01-10 PROBLEM — O99.330 TOBACCO SMOKING AFFECTING PREGNANCY: Status: ACTIVE | Noted: 2017-10-31

## 2018-01-10 PROBLEM — O99.330 TOBACCO SMOKING AFFECTING PREGNANCY: Status: RESOLVED | Noted: 2017-10-31 | Resolved: 2018-01-10

## 2018-01-10 PROBLEM — O09.10 PREGNANCY WITH HISTORY OF ECTOPIC PREGNANCY: Status: ACTIVE | Noted: 2017-10-31

## 2018-01-10 PROCEDURE — 0502F SUBSEQUENT PRENATAL CARE: CPT | Performed by: NURSE PRACTITIONER

## 2018-01-24 ENCOUNTER — ROUTINE PRENATAL (OUTPATIENT)
Dept: OBSTETRICS AND GYNECOLOGY | Facility: CLINIC | Age: 23
End: 2018-01-24

## 2018-01-24 VITALS — SYSTOLIC BLOOD PRESSURE: 98 MMHG | WEIGHT: 146 LBS | DIASTOLIC BLOOD PRESSURE: 62 MMHG | BODY MASS INDEX: 25.06 KG/M2

## 2018-01-24 DIAGNOSIS — Z34.83 PRENATAL CARE, SUBSEQUENT PREGNANCY, THIRD TRIMESTER: Primary | ICD-10-CM

## 2018-01-24 DIAGNOSIS — Z87.891 FORMER SMOKER: ICD-10-CM

## 2018-01-24 PROCEDURE — 0502F SUBSEQUENT PRENATAL CARE: CPT | Performed by: OBSTETRICS & GYNECOLOGY

## 2018-01-24 NOTE — PROGRESS NOTES
Patient report mild swelling in her feet.    No concerns or questions or concerns.  Questions about L&D answered.

## 2018-02-07 ENCOUNTER — ROUTINE PRENATAL (OUTPATIENT)
Dept: OBSTETRICS AND GYNECOLOGY | Facility: CLINIC | Age: 23
End: 2018-02-07

## 2018-02-07 VITALS — DIASTOLIC BLOOD PRESSURE: 60 MMHG | WEIGHT: 148 LBS | SYSTOLIC BLOOD PRESSURE: 100 MMHG | BODY MASS INDEX: 25.4 KG/M2

## 2018-02-07 DIAGNOSIS — Z34.83 PRENATAL CARE, SUBSEQUENT PREGNANCY, THIRD TRIMESTER: Primary | ICD-10-CM

## 2018-02-07 DIAGNOSIS — Z87.891 FORMER SMOKER: ICD-10-CM

## 2018-02-07 LAB — EXTERNAL GROUP B STREP ANTIGEN: NEGATIVE

## 2018-02-07 PROCEDURE — 0502F SUBSEQUENT PRENATAL CARE: CPT | Performed by: OBSTETRICS & GYNECOLOGY

## 2018-02-07 NOTE — PROGRESS NOTES
No contractions, no LOF and no VB.  Good FM.  Cultures today.  Pt reports trace edema later in day sometimes.

## 2018-02-09 LAB
C TRACH RRNA SPEC QL NAA+PROBE: NEGATIVE
GP B STREP DNA SPEC QL NAA+PROBE: NEGATIVE
N GONORRHOEA RRNA SPEC QL NAA+PROBE: NEGATIVE

## 2018-02-14 ENCOUNTER — ROUTINE PRENATAL (OUTPATIENT)
Dept: OBSTETRICS AND GYNECOLOGY | Facility: CLINIC | Age: 23
End: 2018-02-14

## 2018-02-14 VITALS — BODY MASS INDEX: 25.75 KG/M2 | SYSTOLIC BLOOD PRESSURE: 98 MMHG | DIASTOLIC BLOOD PRESSURE: 76 MMHG | WEIGHT: 150 LBS

## 2018-02-14 DIAGNOSIS — Z34.83 PRENATAL CARE, SUBSEQUENT PREGNANCY, THIRD TRIMESTER: Primary | ICD-10-CM

## 2018-02-14 DIAGNOSIS — Z87.891 FORMER SMOKER: ICD-10-CM

## 2018-02-14 PROCEDURE — 99212 OFFICE O/P EST SF 10 MIN: CPT | Performed by: OBSTETRICS & GYNECOLOGY

## 2018-02-21 ENCOUNTER — ROUTINE PRENATAL (OUTPATIENT)
Dept: OBSTETRICS AND GYNECOLOGY | Facility: CLINIC | Age: 23
End: 2018-02-21

## 2018-02-21 VITALS — BODY MASS INDEX: 26.61 KG/M2 | DIASTOLIC BLOOD PRESSURE: 68 MMHG | SYSTOLIC BLOOD PRESSURE: 100 MMHG | WEIGHT: 155 LBS

## 2018-02-21 DIAGNOSIS — Z34.83 PRENATAL CARE, SUBSEQUENT PREGNANCY, THIRD TRIMESTER: Primary | ICD-10-CM

## 2018-02-21 DIAGNOSIS — Z87.891 FORMER SMOKER: ICD-10-CM

## 2018-02-21 PROCEDURE — 0502F SUBSEQUENT PRENATAL CARE: CPT | Performed by: OBSTETRICS & GYNECOLOGY

## 2018-02-28 ENCOUNTER — ROUTINE PRENATAL (OUTPATIENT)
Dept: OBSTETRICS AND GYNECOLOGY | Facility: CLINIC | Age: 23
End: 2018-02-28

## 2018-02-28 VITALS — DIASTOLIC BLOOD PRESSURE: 78 MMHG | SYSTOLIC BLOOD PRESSURE: 116 MMHG | BODY MASS INDEX: 26.78 KG/M2 | WEIGHT: 156 LBS

## 2018-02-28 DIAGNOSIS — Z34.83 PRENATAL CARE, SUBSEQUENT PREGNANCY, THIRD TRIMESTER: Primary | ICD-10-CM

## 2018-02-28 DIAGNOSIS — Z87.891 FORMER SMOKER: ICD-10-CM

## 2018-02-28 PROCEDURE — 0502F SUBSEQUENT PRENATAL CARE: CPT | Performed by: OBSTETRICS & GYNECOLOGY

## 2018-03-06 ENCOUNTER — HOSPITAL ENCOUNTER (OUTPATIENT)
Facility: HOSPITAL | Age: 23
Setting detail: OBSERVATION
Discharge: HOME | End: 2018-03-07
Attending: OBSTETRICS & GYNECOLOGY | Admitting: OBSTETRICS & GYNECOLOGY

## 2018-03-06 VITALS
RESPIRATION RATE: 16 BRPM | BODY MASS INDEX: 26.5 KG/M2 | OXYGEN SATURATION: 97 % | HEIGHT: 64 IN | SYSTOLIC BLOOD PRESSURE: 113 MMHG | TEMPERATURE: 98 F | HEART RATE: 96 BPM | WEIGHT: 155.2 LBS | DIASTOLIC BLOOD PRESSURE: 72 MMHG

## 2018-03-06 PROBLEM — O42.90 RUPTURE OF MEMBRANES WITH DELAY OF DELIVERY: Status: ACTIVE | Noted: 2018-03-06

## 2018-03-06 LAB — A1 MICROGLOB PLACENTAL VAG QL: NEGATIVE

## 2018-03-06 PROCEDURE — G0378 HOSPITAL OBSERVATION PER HR: HCPCS

## 2018-03-06 PROCEDURE — 84112 EVAL AMNIOTIC FLUID PROTEIN: CPT | Performed by: OBSTETRICS & GYNECOLOGY

## 2018-03-07 ENCOUNTER — ANESTHESIA EVENT (OUTPATIENT)
Dept: LABOR AND DELIVERY | Facility: HOSPITAL | Age: 23
End: 2018-03-07

## 2018-03-07 ENCOUNTER — ANESTHESIA (OUTPATIENT)
Dept: LABOR AND DELIVERY | Facility: HOSPITAL | Age: 23
End: 2018-03-07

## 2018-03-07 ENCOUNTER — ROUTINE PRENATAL (OUTPATIENT)
Dept: OBSTETRICS AND GYNECOLOGY | Facility: CLINIC | Age: 23
End: 2018-03-07

## 2018-03-07 ENCOUNTER — HOSPITAL ENCOUNTER (INPATIENT)
Facility: HOSPITAL | Age: 23
LOS: 2 days | Discharge: HOME | End: 2018-03-09
Attending: OBSTETRICS & GYNECOLOGY | Admitting: OBSTETRICS & GYNECOLOGY

## 2018-03-07 VITALS — DIASTOLIC BLOOD PRESSURE: 70 MMHG | BODY MASS INDEX: 26.43 KG/M2 | WEIGHT: 154 LBS | SYSTOLIC BLOOD PRESSURE: 115 MMHG

## 2018-03-07 DIAGNOSIS — Z34.83 PRENATAL CARE, SUBSEQUENT PREGNANCY, THIRD TRIMESTER: Primary | ICD-10-CM

## 2018-03-07 DIAGNOSIS — Z87.891 FORMER SMOKER: ICD-10-CM

## 2018-03-07 PROBLEM — O09.11: Status: RESOLVED | Noted: 2017-07-07 | Resolved: 2018-03-07

## 2018-03-07 PROBLEM — Z34.90 PREGNANCY: Status: ACTIVE | Noted: 2018-03-07

## 2018-03-07 PROBLEM — Z37.9 NORMAL LABOR: Status: RESOLVED | Noted: 2018-03-07 | Resolved: 2018-03-07

## 2018-03-07 PROBLEM — O42.90 RUPTURE OF MEMBRANES WITH DELAY OF DELIVERY: Status: RESOLVED | Noted: 2018-03-06 | Resolved: 2018-03-07

## 2018-03-07 PROBLEM — Z34.90 PREGNANCY: Status: RESOLVED | Noted: 2018-03-07 | Resolved: 2018-03-07

## 2018-03-07 PROBLEM — Z37.9 NORMAL LABOR: Status: ACTIVE | Noted: 2018-03-07

## 2018-03-07 LAB
A1 MICROGLOB PLACENTAL VAG QL: NEGATIVE
ABO GROUP BLD: NORMAL
BLD GP AB SCN SERPL QL: POSITIVE
DEPRECATED RDW RBC AUTO: 37.1 FL (ref 40–54)
ERYTHROCYTE [DISTWIDTH] IN BLOOD BY AUTOMATED COUNT: 12.8 % (ref 12–15)
HCT VFR BLD AUTO: 31 % (ref 37–47)
HGB BLD-MCNC: 10.3 G/DL (ref 12–16)
MCH RBC QN AUTO: 27 PG (ref 28–32)
MCHC RBC AUTO-ENTMCNC: 33.2 G/DL (ref 33–36)
MCV RBC AUTO: 81.2 FL (ref 82–98)
PLATELET # BLD AUTO: 259 10*3/MM3 (ref 130–400)
PMV BLD AUTO: 10.5 FL (ref 6–12)
RBC # BLD AUTO: 3.82 10*6/MM3 (ref 4.2–5.4)
RESIDUAL RHIG DETECTED: NORMAL
RH BLD: NEGATIVE
WBC NRBC COR # BLD: 15.87 10*3/MM3 (ref 4.8–10.8)

## 2018-03-07 PROCEDURE — 25010000002 MORPHINE PER 10 MG: Performed by: OBSTETRICS & GYNECOLOGY

## 2018-03-07 PROCEDURE — 86850 RBC ANTIBODY SCREEN: CPT | Performed by: OBSTETRICS & GYNECOLOGY

## 2018-03-07 PROCEDURE — 0HQ9XZZ REPAIR PERINEUM SKIN, EXTERNAL APPROACH: ICD-10-PCS | Performed by: OBSTETRICS & GYNECOLOGY

## 2018-03-07 PROCEDURE — 0502F SUBSEQUENT PRENATAL CARE: CPT | Performed by: OBSTETRICS & GYNECOLOGY

## 2018-03-07 PROCEDURE — C1755 CATHETER, INTRASPINAL: HCPCS | Performed by: NURSE ANESTHETIST, CERTIFIED REGISTERED

## 2018-03-07 PROCEDURE — 59400 OBSTETRICAL CARE: CPT | Performed by: OBSTETRICS & GYNECOLOGY

## 2018-03-07 PROCEDURE — 88307 TISSUE EXAM BY PATHOLOGIST: CPT | Performed by: OBSTETRICS & GYNECOLOGY

## 2018-03-07 PROCEDURE — G0463 HOSPITAL OUTPT CLINIC VISIT: HCPCS

## 2018-03-07 PROCEDURE — 86900 BLOOD TYPING SEROLOGIC ABO: CPT | Performed by: OBSTETRICS & GYNECOLOGY

## 2018-03-07 PROCEDURE — 86901 BLOOD TYPING SEROLOGIC RH(D): CPT | Performed by: OBSTETRICS & GYNECOLOGY

## 2018-03-07 PROCEDURE — 85027 COMPLETE CBC AUTOMATED: CPT | Performed by: OBSTETRICS & GYNECOLOGY

## 2018-03-07 PROCEDURE — 86870 RBC ANTIBODY IDENTIFICATION: CPT | Performed by: OBSTETRICS & GYNECOLOGY

## 2018-03-07 PROCEDURE — 25010000002 ROPIVACAINE PER 1 MG: Performed by: NURSE ANESTHETIST, CERTIFIED REGISTERED

## 2018-03-07 PROCEDURE — 4A1HX4Z MONITORING OF PRODUCTS OF CONCEPTION, CARDIAC ELECTRICAL ACTIVITY, EXTERNAL APPROACH: ICD-10-PCS | Performed by: OBSTETRICS & GYNECOLOGY

## 2018-03-07 PROCEDURE — 84112 EVAL AMNIOTIC FLUID PROTEIN: CPT | Performed by: OBSTETRICS & GYNECOLOGY

## 2018-03-07 PROCEDURE — 51703 INSERT BLADDER CATH COMPLEX: CPT

## 2018-03-07 RX ORDER — OXYCODONE AND ACETAMINOPHEN 10; 325 MG/1; MG/1
1 TABLET ORAL EVERY 4 HOURS PRN
Status: DISCONTINUED | OUTPATIENT
Start: 2018-03-07 | End: 2018-03-09 | Stop reason: HOSPADM

## 2018-03-07 RX ORDER — EPHEDRINE SULFATE 50 MG/ML
5 INJECTION, SOLUTION INTRAVENOUS
Status: DISCONTINUED | OUTPATIENT
Start: 2018-03-07 | End: 2018-03-07 | Stop reason: HOSPADM

## 2018-03-07 RX ORDER — ONDANSETRON 2 MG/ML
4 INJECTION INTRAMUSCULAR; INTRAVENOUS EVERY 6 HOURS PRN
Status: DISCONTINUED | OUTPATIENT
Start: 2018-03-07 | End: 2018-03-07 | Stop reason: HOSPADM

## 2018-03-07 RX ORDER — IBUPROFEN 200 MG
600 TABLET ORAL EVERY 8 HOURS PRN
Status: DISCONTINUED | OUTPATIENT
Start: 2018-03-07 | End: 2018-03-09 | Stop reason: HOSPADM

## 2018-03-07 RX ORDER — PRENATAL VIT/IRON FUM/FOLIC AC 27MG-0.8MG
1 TABLET ORAL DAILY
Status: DISCONTINUED | OUTPATIENT
Start: 2018-03-08 | End: 2018-03-09 | Stop reason: HOSPADM

## 2018-03-07 RX ORDER — OXYTOCIN/RINGER'S LACTATE 20/1000 ML
125 PLASTIC BAG, INJECTION (ML) INTRAVENOUS AS NEEDED
Status: DISCONTINUED | OUTPATIENT
Start: 2018-03-07 | End: 2018-03-07 | Stop reason: HOSPADM

## 2018-03-07 RX ORDER — TERBUTALINE SULFATE 1 MG/ML
0.25 INJECTION, SOLUTION SUBCUTANEOUS AS NEEDED
Status: DISCONTINUED | OUTPATIENT
Start: 2018-03-07 | End: 2018-03-07 | Stop reason: HOSPADM

## 2018-03-07 RX ORDER — ONDANSETRON 4 MG/1
4 TABLET, FILM COATED ORAL EVERY 6 HOURS PRN
Status: DISCONTINUED | OUTPATIENT
Start: 2018-03-07 | End: 2018-03-07 | Stop reason: HOSPADM

## 2018-03-07 RX ORDER — CARBOPROST TROMETHAMINE 250 UG/ML
250 INJECTION, SOLUTION INTRAMUSCULAR ONCE
Status: DISCONTINUED | OUTPATIENT
Start: 2018-03-07 | End: 2018-03-09 | Stop reason: HOSPADM

## 2018-03-07 RX ORDER — OXYCODONE HYDROCHLORIDE AND ACETAMINOPHEN 5; 325 MG/1; MG/1
2 TABLET ORAL EVERY 4 HOURS PRN
Status: DISCONTINUED | OUTPATIENT
Start: 2018-03-07 | End: 2018-03-07 | Stop reason: HOSPADM

## 2018-03-07 RX ORDER — MORPHINE SULFATE 10 MG/ML
10 INJECTION INTRAMUSCULAR; INTRAVENOUS; SUBCUTANEOUS
Status: DISCONTINUED | OUTPATIENT
Start: 2018-03-07 | End: 2018-03-07 | Stop reason: HOSPADM

## 2018-03-07 RX ORDER — CARBOPROST TROMETHAMINE 250 UG/ML
250 INJECTION, SOLUTION INTRAMUSCULAR AS NEEDED
Status: DISCONTINUED | OUTPATIENT
Start: 2018-03-07 | End: 2018-03-07 | Stop reason: HOSPADM

## 2018-03-07 RX ORDER — PROMETHAZINE HYDROCHLORIDE 25 MG/ML
12.5 INJECTION, SOLUTION INTRAMUSCULAR; INTRAVENOUS EVERY 6 HOURS PRN
Status: DISCONTINUED | OUTPATIENT
Start: 2018-03-07 | End: 2018-03-07 | Stop reason: HOSPADM

## 2018-03-07 RX ORDER — PROMETHAZINE HYDROCHLORIDE 25 MG/1
12.5 TABLET ORAL EVERY 6 HOURS PRN
Status: DISCONTINUED | OUTPATIENT
Start: 2018-03-07 | End: 2018-03-07 | Stop reason: HOSPADM

## 2018-03-07 RX ORDER — LIDOCAINE HYDROCHLORIDE 20 MG/ML
20 INJECTION, SOLUTION EPIDURAL; INFILTRATION; INTRACAUDAL; PERINEURAL ONCE
Status: DISCONTINUED | OUTPATIENT
Start: 2018-03-07 | End: 2018-03-07

## 2018-03-07 RX ORDER — SODIUM CHLORIDE, SODIUM LACTATE, POTASSIUM CHLORIDE, CALCIUM CHLORIDE 600; 310; 30; 20 MG/100ML; MG/100ML; MG/100ML; MG/100ML
125 INJECTION, SOLUTION INTRAVENOUS CONTINUOUS
Status: DISCONTINUED | OUTPATIENT
Start: 2018-03-07 | End: 2018-03-09 | Stop reason: HOSPADM

## 2018-03-07 RX ORDER — ACETAMINOPHEN 325 MG/1
650 TABLET ORAL EVERY 4 HOURS PRN
Status: DISCONTINUED | OUTPATIENT
Start: 2018-03-07 | End: 2018-03-07 | Stop reason: HOSPADM

## 2018-03-07 RX ORDER — OXYCODONE HYDROCHLORIDE AND ACETAMINOPHEN 5; 325 MG/1; MG/1
1 TABLET ORAL EVERY 4 HOURS PRN
Status: DISCONTINUED | OUTPATIENT
Start: 2018-03-07 | End: 2018-03-09 | Stop reason: HOSPADM

## 2018-03-07 RX ORDER — SODIUM CHLORIDE 0.9 % (FLUSH) 0.9 %
1-10 SYRINGE (ML) INJECTION AS NEEDED
Status: DISCONTINUED | OUTPATIENT
Start: 2018-03-07 | End: 2018-03-09 | Stop reason: HOSPADM

## 2018-03-07 RX ORDER — MISOPROSTOL 200 UG/1
600 TABLET ORAL ONCE
Status: DISCONTINUED | OUTPATIENT
Start: 2018-03-07 | End: 2018-03-09 | Stop reason: HOSPADM

## 2018-03-07 RX ORDER — PROMETHAZINE HYDROCHLORIDE 12.5 MG/1
12.5 SUPPOSITORY RECTAL EVERY 6 HOURS PRN
Status: DISCONTINUED | OUTPATIENT
Start: 2018-03-07 | End: 2018-03-07 | Stop reason: HOSPADM

## 2018-03-07 RX ORDER — PROMETHAZINE HYDROCHLORIDE 25 MG/ML
12.5 INJECTION, SOLUTION INTRAMUSCULAR; INTRAVENOUS EVERY 6 HOURS PRN
Status: DISCONTINUED | OUTPATIENT
Start: 2018-03-07 | End: 2018-03-09 | Stop reason: HOSPADM

## 2018-03-07 RX ORDER — FAMOTIDINE 10 MG/ML
20 INJECTION, SOLUTION INTRAVENOUS ONCE AS NEEDED
Status: DISCONTINUED | OUTPATIENT
Start: 2018-03-07 | End: 2018-03-07 | Stop reason: HOSPADM

## 2018-03-07 RX ORDER — PROMETHAZINE HYDROCHLORIDE 12.5 MG/1
12.5 SUPPOSITORY RECTAL EVERY 6 HOURS PRN
Status: DISCONTINUED | OUTPATIENT
Start: 2018-03-07 | End: 2018-03-09 | Stop reason: HOSPADM

## 2018-03-07 RX ORDER — LIDOCAINE HYDROCHLORIDE 10 MG/ML
5 INJECTION, SOLUTION EPIDURAL; INFILTRATION; INTRACAUDAL; PERINEURAL AS NEEDED
Status: DISCONTINUED | OUTPATIENT
Start: 2018-03-07 | End: 2018-03-07 | Stop reason: HOSPADM

## 2018-03-07 RX ORDER — MORPHINE SULFATE 2 MG/ML
1 INJECTION, SOLUTION INTRAMUSCULAR; INTRAVENOUS EVERY 4 HOURS PRN
Status: DISCONTINUED | OUTPATIENT
Start: 2018-03-07 | End: 2018-03-09 | Stop reason: HOSPADM

## 2018-03-07 RX ORDER — NALOXONE HCL 0.4 MG/ML
0.4 VIAL (ML) INJECTION
Status: DISCONTINUED | OUTPATIENT
Start: 2018-03-07 | End: 2018-03-09 | Stop reason: HOSPADM

## 2018-03-07 RX ORDER — ONDANSETRON 4 MG/1
4 TABLET, ORALLY DISINTEGRATING ORAL EVERY 6 HOURS PRN
Status: DISCONTINUED | OUTPATIENT
Start: 2018-03-07 | End: 2018-03-07 | Stop reason: HOSPADM

## 2018-03-07 RX ORDER — ACETAMINOPHEN 325 MG/1
650 TABLET ORAL EVERY 4 HOURS PRN
Status: DISCONTINUED | OUTPATIENT
Start: 2018-03-07 | End: 2018-03-09 | Stop reason: HOSPADM

## 2018-03-07 RX ORDER — PROMETHAZINE HYDROCHLORIDE 25 MG/1
25 TABLET ORAL EVERY 6 HOURS PRN
Status: DISCONTINUED | OUTPATIENT
Start: 2018-03-07 | End: 2018-03-09 | Stop reason: HOSPADM

## 2018-03-07 RX ORDER — OXYTOCIN/RINGER'S LACTATE 20/1000 ML
999 PLASTIC BAG, INJECTION (ML) INTRAVENOUS ONCE
Status: COMPLETED | OUTPATIENT
Start: 2018-03-07 | End: 2018-03-07

## 2018-03-07 RX ORDER — LIDOCAINE HYDROCHLORIDE AND EPINEPHRINE 15; 5 MG/ML; UG/ML
INJECTION, SOLUTION EPIDURAL AS NEEDED
Status: DISCONTINUED | OUTPATIENT
Start: 2018-03-07 | End: 2018-03-08 | Stop reason: SURG

## 2018-03-07 RX ORDER — LIDOCAINE HYDROCHLORIDE 20 MG/ML
INJECTION, SOLUTION INFILTRATION; PERINEURAL
Status: COMPLETED
Start: 2018-03-07 | End: 2018-03-07

## 2018-03-07 RX ORDER — CALCIUM CARBONATE 200(500)MG
2 TABLET,CHEWABLE ORAL 3 TIMES DAILY PRN
Status: DISCONTINUED | OUTPATIENT
Start: 2018-03-07 | End: 2018-03-09 | Stop reason: HOSPADM

## 2018-03-07 RX ORDER — SODIUM CHLORIDE, SODIUM LACTATE, POTASSIUM CHLORIDE, CALCIUM CHLORIDE 600; 310; 30; 20 MG/100ML; MG/100ML; MG/100ML; MG/100ML
125 INJECTION, SOLUTION INTRAVENOUS CONTINUOUS
Status: DISCONTINUED | OUTPATIENT
Start: 2018-03-07 | End: 2018-03-07

## 2018-03-07 RX ORDER — BISACODYL 10 MG
10 SUPPOSITORY, RECTAL RECTAL DAILY PRN
Status: DISCONTINUED | OUTPATIENT
Start: 2018-03-08 | End: 2018-03-09 | Stop reason: HOSPADM

## 2018-03-07 RX ORDER — ONDANSETRON 2 MG/ML
4 INJECTION INTRAMUSCULAR; INTRAVENOUS EVERY 6 HOURS PRN
Status: DISCONTINUED | OUTPATIENT
Start: 2018-03-07 | End: 2018-03-09 | Stop reason: HOSPADM

## 2018-03-07 RX ORDER — DOCUSATE SODIUM 100 MG/1
100 CAPSULE, LIQUID FILLED ORAL 2 TIMES DAILY
Status: DISCONTINUED | OUTPATIENT
Start: 2018-03-07 | End: 2018-03-09 | Stop reason: HOSPADM

## 2018-03-07 RX ORDER — CALCIUM CARBONATE 200(500)MG
2 TABLET,CHEWABLE ORAL 3 TIMES DAILY PRN
Status: DISCONTINUED | OUTPATIENT
Start: 2018-03-07 | End: 2018-03-07 | Stop reason: HOSPADM

## 2018-03-07 RX ORDER — SODIUM CHLORIDE 0.9 % (FLUSH) 0.9 %
1-10 SYRINGE (ML) INJECTION AS NEEDED
Status: DISCONTINUED | OUTPATIENT
Start: 2018-03-07 | End: 2018-03-07 | Stop reason: HOSPADM

## 2018-03-07 RX ORDER — PENICILLIN G 3000000 [IU]/50ML
3 INJECTION, SOLUTION INTRAVENOUS EVERY 4 HOURS
Status: DISCONTINUED | OUTPATIENT
Start: 2018-03-07 | End: 2018-03-07 | Stop reason: HOSPADM

## 2018-03-07 RX ORDER — METHYLERGONOVINE MALEATE 0.2 MG/ML
200 INJECTION INTRAVENOUS ONCE AS NEEDED
Status: DISCONTINUED | OUTPATIENT
Start: 2018-03-07 | End: 2018-03-07 | Stop reason: HOSPADM

## 2018-03-07 RX ORDER — FAMOTIDINE 20 MG/1
20 TABLET, FILM COATED ORAL ONCE AS NEEDED
Status: DISCONTINUED | OUTPATIENT
Start: 2018-03-07 | End: 2018-03-07 | Stop reason: HOSPADM

## 2018-03-07 RX ORDER — ONDANSETRON 4 MG/1
4 TABLET, FILM COATED ORAL EVERY 8 HOURS PRN
Status: DISCONTINUED | OUTPATIENT
Start: 2018-03-07 | End: 2018-03-09 | Stop reason: HOSPADM

## 2018-03-07 RX ORDER — MISOPROSTOL 200 UG/1
800 TABLET ORAL AS NEEDED
Status: DISCONTINUED | OUTPATIENT
Start: 2018-03-07 | End: 2018-03-07 | Stop reason: HOSPADM

## 2018-03-07 RX ORDER — METHYLERGONOVINE MALEATE 0.2 MG/ML
200 INJECTION INTRAVENOUS ONCE
Status: DISCONTINUED | OUTPATIENT
Start: 2018-03-07 | End: 2018-03-09 | Stop reason: HOSPADM

## 2018-03-07 RX ADMIN — SODIUM CHLORIDE, POTASSIUM CHLORIDE, SODIUM LACTATE AND CALCIUM CHLORIDE 125 ML/HR: 600; 310; 30; 20 INJECTION, SOLUTION INTRAVENOUS at 13:58

## 2018-03-07 RX ADMIN — LIDOCAINE HYDROCHLORIDE 20 ML: 20 INJECTION, SOLUTION INFILTRATION; PERINEURAL at 19:45

## 2018-03-07 RX ADMIN — SODIUM CHLORIDE, POTASSIUM CHLORIDE, SODIUM LACTATE AND CALCIUM CHLORIDE 1000 ML: 600; 310; 30; 20 INJECTION, SOLUTION INTRAVENOUS at 15:10

## 2018-03-07 RX ADMIN — LIDOCAINE HYDROCHLORIDE AND EPINEPHRINE 2 ML: 15; 5 INJECTION, SOLUTION EPIDURAL at 14:58

## 2018-03-07 RX ADMIN — LIDOCAINE HYDROCHLORIDE AND EPINEPHRINE 6 ML: 15; 5 INJECTION, SOLUTION EPIDURAL at 15:01

## 2018-03-07 RX ADMIN — ROPIVACAINE HYDROCHLORIDE 10 ML/HR: 2 INJECTION, SOLUTION EPIDURAL; INFILTRATION at 15:11

## 2018-03-07 RX ADMIN — OXYTOCIN 125 ML/HR: 10 INJECTION INTRAVENOUS at 20:54

## 2018-03-07 RX ADMIN — OXYTOCIN 999 ML/HR: 10 INJECTION INTRAVENOUS at 19:17

## 2018-03-07 RX ADMIN — SODIUM CHLORIDE, POTASSIUM CHLORIDE, SODIUM LACTATE AND CALCIUM CHLORIDE 125 ML/HR: 600; 310; 30; 20 INJECTION, SOLUTION INTRAVENOUS at 16:11

## 2018-03-07 RX ADMIN — MORPHINE SULFATE 10 MG: 10 INJECTION INTRAVENOUS at 13:50

## 2018-03-07 RX ADMIN — DOCUSATE SODIUM 100 MG: 100 CAPSULE, LIQUID FILLED ORAL at 23:26

## 2018-03-07 RX ADMIN — IBUPROFEN 600 MG: 200 TABLET, FILM COATED ORAL at 23:26

## 2018-03-07 NOTE — ANESTHESIA PROCEDURE NOTES
Labor Epidural    Patient location during procedure: OB  Indication:at surgeon's request  Performed By  CRNA: RENATO NAVARRO  Prep:  Pt Position:sitting  Sterile Tech:cap, gloves, mask and sterile barrier  Prep:chlorhexidine gluconate and isopropyl alcohol  Monitoring:blood pressure monitoring and continuous pulse oximetry  Epidural Block Procedure:  Approach:midline  Guidance:landmark technique  Location:L4-L5  Needle Type:Tuohy  Needle Gauge:18 G  Loss of Resistance Medium: saline  Loss of Resistance: 7cm  Cath Depth at skin:11 cm  Paresthesia: none

## 2018-03-07 NOTE — H&P
"[facility]  Urbano Pineda  : 1995  MRN: [id number]  CSN: [id number]    History and Physical    Subjective   Urbano Pineda is a 22 y.o. year old  with an Estimated Date of Delivery: 3/7/18 currently at 40w0d presenting with increasingly painful contractions..    Prenatal care has been with Dr. Yesica Levine.  It has been benign.    Obstetric History       T0      L0     SAB1   TAB0   Ectopic1   Multiple0   Live Births0       # Outcome Date GA Lbr Ricky/2nd Weight Sex Delivery Anes PTL Lv   3 Current            2 SAB 17 6w0d          1 Ectopic  6w0d               Past Medical History:   Diagnosis Date   • Spon  w/o complication      Past Surgical History:   Procedure Laterality Date   • WISDOM TOOTH EXTRACTION       No current facility-administered medications for this encounter.     Allergies   Allergen Reactions   • Cephalexin Other (See Comments)     Passed out     Smoking status: Former Smoker                                                              Packs/day: 0.00      Years: 0.00         Types: Cigarettes  Smokeless status: Never Used                        Review of Systems      Objective   /63 (BP Location: Left arm, Patient Position: Lying)  Pulse 78  Temp 98.2 °F (36.8 °C) (Temporal Artery )   Resp 16  Ht 162.6 cm (64\")  Wt 69.5 kg (153 lb 2 oz)  LMP 2017 (Exact Date)  Breastfeeding? Yes  BMI 26.28 kg/m2  General: well developed; well nourished  no acute distress   Heart: Not performed.   Lungs: breathing is unlabored   Abdomen: soft, non-tender; no masses  fundus firm and non-tender   FHT's: reactive, reassuring  135 +accels, no decels, moderate variability   Cervix: was checked (by me): 4 cm / 90 % / -3.  BBOW, AROM with bloody fluid   Presentation: cephalic   Contractions: irregular     Prenatal Labs  Lab Results   Component Value Date    HGB 10.2 (L) 2017    HEPBSAG Negative 2017    ABORH CANCELED 2015    ABORH " CANCELED 09/13/2015    ABORH AB Rh Negative 09/13/2015    ABO AB 07/07/2017    RH Negative 07/07/2017    ABSCRN Negative 12/13/2017    GNR2TNJ5 Negative 07/07/2017    GBSANTIGEN Negative 02/07/2018    URINECX No growth at 24 hours 08/30/2017       Current Labs Reviewed   No data reviewed       Assessment   1. IUP at 40w0d  2. Group B strep status: negative  3. Fetus reassuring  4. Patient has made cervical change since being checked in office this morning: early labor     Plan   1. AROM - bloody fluid seen, will monitor closely but fetus looks fine at this time.    Yesica Levine MD  3/7/2018  1:11 PM

## 2018-03-07 NOTE — ANESTHESIA PREPROCEDURE EVALUATION
Anesthesia Evaluation     Patient summary reviewed and Nursing notes reviewed   NPO Solid Status: > 6 hours  NPO Liquid Status: > 4 hours           Airway   Mallampati: II  Neck ROM: full  No difficulty expected  Dental - normal exam     Pulmonary - normal exam   (+) a smoker,   Cardiovascular - negative cardio ROS and normal exam    Rhythm: regular        Neuro/Psych  GI/Hepatic/Renal/Endo    (+) obesity,       Musculoskeletal (-) negative ROS    Abdominal  - normal exam   Substance History - negative use     OB/GYN    (+) Pregnant,         Other - negative ROS                       Anesthesia Plan    ASA 2     epidural     Anesthetic plan and risks discussed with patient and spouse/significant other.

## 2018-03-07 NOTE — PROGRESS NOTES
Patient with negative Amnisure last evening, but still feels like she is leaking.  Cx 3/90/-3 with intact membranes on exam  Going back to L&D for possible labor

## 2018-03-08 LAB
ABO GROUP BLD: NORMAL
BASOPHILS # BLD AUTO: 0.03 10*3/MM3 (ref 0–0.2)
BASOPHILS NFR BLD AUTO: 0.2 % (ref 0–2)
BLD GP AB SCN SERPL QL: POSITIVE
DEPRECATED RDW RBC AUTO: 37.4 FL (ref 40–54)
EOSINOPHIL # BLD AUTO: 0.02 10*3/MM3 (ref 0–0.7)
EOSINOPHIL NFR BLD AUTO: 0.1 % (ref 0–4)
ERYTHROCYTE [DISTWIDTH] IN BLOOD BY AUTOMATED COUNT: 12.9 % (ref 12–15)
FETAL BLEED: NEGATIVE
HCT VFR BLD AUTO: 22.1 % (ref 37–47)
HGB BLD-MCNC: 7.3 G/DL (ref 12–16)
IMM GRANULOCYTES # BLD: 0.09 10*3/MM3 (ref 0–0.03)
IMM GRANULOCYTES NFR BLD: 0.6 % (ref 0–5)
LYMPHOCYTES # BLD AUTO: 2.09 10*3/MM3 (ref 0.72–4.86)
LYMPHOCYTES NFR BLD AUTO: 14.9 % (ref 15–45)
MCH RBC QN AUTO: 26.9 PG (ref 28–32)
MCHC RBC AUTO-ENTMCNC: 33 G/DL (ref 33–36)
MCV RBC AUTO: 81.5 FL (ref 82–98)
MONOCYTES # BLD AUTO: 0.93 10*3/MM3 (ref 0.19–1.3)
MONOCYTES NFR BLD AUTO: 6.6 % (ref 4–12)
NEUTROPHILS # BLD AUTO: 10.87 10*3/MM3 (ref 1.87–8.4)
NEUTROPHILS NFR BLD AUTO: 77.6 % (ref 39–78)
NRBC BLD MANUAL-RTO: 0 /100 WBC (ref 0–0)
NUMBER OF DOSES: NORMAL
PLATELET # BLD AUTO: 172 10*3/MM3 (ref 130–400)
PMV BLD AUTO: 10.9 FL (ref 6–12)
RBC # BLD AUTO: 2.71 10*6/MM3 (ref 4.2–5.4)
RH BLD: NEGATIVE
WBC NRBC COR # BLD: 14.03 10*3/MM3 (ref 4.8–10.8)

## 2018-03-08 PROCEDURE — 86900 BLOOD TYPING SEROLOGIC ABO: CPT | Performed by: OBSTETRICS & GYNECOLOGY

## 2018-03-08 PROCEDURE — 86901 BLOOD TYPING SEROLOGIC RH(D): CPT | Performed by: OBSTETRICS & GYNECOLOGY

## 2018-03-08 PROCEDURE — 85461 HEMOGLOBIN FETAL: CPT | Performed by: OBSTETRICS & GYNECOLOGY

## 2018-03-08 PROCEDURE — 85025 COMPLETE CBC W/AUTO DIFF WBC: CPT | Performed by: OBSTETRICS & GYNECOLOGY

## 2018-03-08 PROCEDURE — 25010000003 RHO D IMMUNE GLOBULIN 1500 UNITS SOLUTION PREFILLED SYRINGE: Performed by: OBSTETRICS & GYNECOLOGY

## 2018-03-08 PROCEDURE — 86850 RBC ANTIBODY SCREEN: CPT

## 2018-03-08 RX ADMIN — DOCUSATE SODIUM 100 MG: 100 CAPSULE, LIQUID FILLED ORAL at 07:55

## 2018-03-08 RX ADMIN — OXYCODONE HYDROCHLORIDE AND ACETAMINOPHEN 1 TABLET: 5; 325 TABLET ORAL at 14:39

## 2018-03-08 RX ADMIN — PRENATAL VIT W/ FE FUMARATE-FA TAB 27-0.8 MG 1 TABLET: 27-0.8 TAB at 07:55

## 2018-03-08 RX ADMIN — HUMAN RHO(D) IMMUNE GLOBULIN 1500 UNITS: 300 INJECTION, SOLUTION INTRAMUSCULAR at 14:33

## 2018-03-08 RX ADMIN — OXYCODONE HYDROCHLORIDE AND ACETAMINOPHEN 1 TABLET: 5; 325 TABLET ORAL at 10:37

## 2018-03-08 RX ADMIN — IBUPROFEN 600 MG: 200 TABLET, FILM COATED ORAL at 20:00

## 2018-03-08 RX ADMIN — IBUPROFEN 600 MG: 200 TABLET, FILM COATED ORAL at 07:54

## 2018-03-08 NOTE — PROGRESS NOTES
" [facility]  Vaginal Delivery Progress Note    Subjective   Postpartum Day 1: Vaginal Delivery    The patient feels well.  Her pain is well controlled with nonsteroidal anti-inflammatory drugs.   She is ambulating well.  Patient describes her bleeding as moderate lochia.    Breastfeeding: has not attempted yet.    Objective     Vital Signs Range for the last 24 hours  Temperature: Temp:  [98.1 °F (36.7 °C)-99 °F (37.2 °C)] 98.7 °F (37.1 °C)   Temp Source: Temp src: Temporal Artery    BP: BP: ()/(35-81) 131/72   Pulse: Heart Rate:  [] 89   Respirations: Resp:  [16-18] 16   SPO2: SpO2:  [97 %-100 %] 97 %   O2 Amount (l/min):     O2 Devices O2 Device: room air   Weight: Weight:  [69.5 kg (153 lb 2 oz)-69.9 kg (154 lb)] 69.5 kg (153 lb 2 oz)     Admit Height:  Height: 162.6 cm (64\")      Physical Exam:  General:  no acute distresss.  Abdomen: Fundus: appropriate, firm, non tender  Extremities: normal, atraumatic, no cyanosis, and trace edema.       Lab results reviewed:  No   Rubella:  No results found for: RUBELLAIGGIN Nurse Transcribed from prenatal record --  No components found for: EXTRUBELQUAL  Rh Status:    RH type   Date Value Ref Range Status   03/07/2018 Negative  Final     Immunizations:   Immunization History   Administered Date(s) Administered   • Flu Vaccine Intradermal Quad 18-64YR 10/18/2017   • Rho (D) Immune Globulin 01/17/2017, 12/13/2017   • Tdap 12/27/2017     Lab Results (last 24 hours)     Procedure Component Value Units Date/Time    Group B Streptococcus Culture - Swab, Vaginal/Rectum [[id number]] Resulted:  02/07/18     Specimen:  Swab from Vaginal/Rectum Updated:  03/07/18 1058     External Strep Group B Ag Negative    PAMG-1, Rapid Assay For ROM - Amniotic Fluid, [[id number]]  (Normal) Collected:  03/07/18 1157    Specimen:  Amniotic Fluid Updated:  03/07/18 1217     Rupture of Membranes Negative    CBC (No Diff) [[id number]]  (Abnormal) Collected:  03/07/18 1359    Specimen:  Blood " Updated:  03/07/18 1411     WBC 15.87 (H) 10*3/mm3      RBC 3.82 (L) 10*6/mm3      Hemoglobin 10.3 (L) g/dL      Hematocrit 31.0 (L) %      MCV 81.2 (L) fL      MCH 27.0 (L) pg      MCHC 33.2 g/dL      RDW 12.8 %      RDW-SD 37.1 (L) fl      MPV 10.5 fL      Platelets 259 10*3/mm3           Assessment/Plan     Active Problems:    * No active hospital problems. *      Urbano Pineda is Day 1  post-partum  Vaginal, Spontaneous Delivery    .      Plan:  Continue current care.      Saen Olguin MD  3/8/2018  7:24 AM

## 2018-03-08 NOTE — PLAN OF CARE
Problem: Patient Care Overview (Adult)  Goal: Plan of Care Review  Outcome: Ongoing (interventions implemented as appropriate)   18 1654   Coping/Psychosocial Response Interventions   Plan Of Care Reviewed With patient   Patient Care Overview   Progress improving   Outcome Evaluation   Outcome Summary/Follow up Plan FFMLU1, scant rubra, vss, voiding, passing gas, aqua pad, donut ring, ice packs, and dermaplast used for comfort, prn motrin and percocet given with good results. Encouraged pt to ambulate and increase fluid intake. Breastfeeding, pump provided. EPDS score of 6, RhoGam given. Family at bedside. Will continue to monitor.     Goal: Adult Individualization and Mutuality  Outcome: Ongoing (interventions implemented as appropriate)   18   Individualization   Patient Specific Preferences breastfeeding    Patient Specific Goals improve breastfeeding techniques, tolerable pain   Patient Specific Interventions lactation consulted, pain managed with motrin and percocet     Goal: Discharge Needs Assessment  Outcome: Ongoing (interventions implemented as appropriate)      Problem: Labor (Cervical Ripen, Induct, Augment) (Adult,Obstetrics,Pediatric)  Goal: Signs and Symptoms of Listed Potential Problems Will be Absent or Manageable (Labor)  Outcome: Outcome(s) achieved Date Met: 18      Problem: Anesthesia/Analgesia, Neuraxial (Adult)  Goal: Signs and Symptoms of Listed Potential Problems Will be Absent or Manageable (Anesthesia/Analgesia, Neuraxial)  Outcome: Ongoing (interventions implemented as appropriate)   18   Anesthesia/Analgesia, Neuraxial   Problems Assessed (Neuraxial Anesthesia/Analgesia) all   Problems Present (Neuraxial Anesthesia/Analgesia) none  (no c/o HA or dizziness)       Problem: Fall Risk  (Adult,Obstetrics,Pediatric)  Goal: Identify Related Risk Factors and Signs and Symptoms  Outcome: Outcome(s) achieved Date Met: 18    Goal: Absence of Maternal  Fall  Outcome: Ongoing (interventions implemented as appropriate)    Goal: Absence of [place] Fall/Drop  Outcome: Ongoing (interventions implemented as appropriate)      Problem: Postpartum, Vaginal Delivery (Adult)  Goal: Signs and Symptoms of Listed Potential Problems Will be Absent or Manageable (Postpartum, Vaginal Delivery)  Outcome: Ongoing (interventions implemented as appropriate)   18 2434   Postpartum, Vaginal Delivery   Problems Assessed (Postpartum Vaginal Delivery) all   Problems Present (Postpartum Vaginal Delivery) pain

## 2018-03-08 NOTE — PLAN OF CARE
Problem: Patient Care Overview (Adult)  Goal: Plan of Care Review  Outcome: Ongoing (interventions implemented as appropriate)   03/08/18 0607   Coping/Psychosocial Response Interventions   Plan Of Care Reviewed With patient;spouse;significant other   Outcome Evaluation   Outcome Summary/Follow up Plan FF ML U1 scant lochia. Removed cath (DTV) and patient took a shower. Motrin given for pain control.      Goal: Adult Individualization and Mutuality  Outcome: Ongoing (interventions implemented as appropriate)   03/08/18 0607   Individualization   Patient Specific Preferences breastfeeding   Patient Specific Goals improve latch techniques/timing   Patient Specific Interventions assist as needed     Goal: Discharge Needs Assessment  Outcome: Ongoing (interventions implemented as appropriate)      Problem: Postpartum, Vaginal Delivery (Adult)  Goal: Signs and Symptoms of Listed Potential Problems Will be Absent or Manageable (Postpartum, Vaginal Delivery)  Outcome: Ongoing (interventions implemented as appropriate)

## 2018-03-08 NOTE — ANESTHESIA POSTPROCEDURE EVALUATION
"Patient: Urbano Pineda    Procedure Summary     Date Anesthesia Start Anesthesia Stop Room / Location    03/07/18 [id number]        Procedure Diagnosis Scheduled Providers Provider    LABOR ANALGESIA No diagnosis on file.  Drew Dahl CRNA          Anesthesia Type: epidural  Last vitals  BP   133/56 (03/08/18 1129)   Temp   98 °F (36.7 °C) (03/08/18 1129)   Pulse   89 (03/08/18 1129)   Resp   18 (03/08/18 1129)     SpO2   97 % (03/08/18 1129)     Post Anesthesia Care and Evaluation    Patient location during evaluation: bedside  Patient participation: complete - patient participated  Level of consciousness: awake and alert  Pain management: adequate  Airway patency: patent  Anesthetic complications: No anesthetic complications    Cardiovascular status: acceptable  Respiratory status: acceptable  Hydration status: acceptable  Post Neuraxial Block status: Motor and sensory function returned to baseline and No signs or symptoms of PDPH  Comments: Blood pressure 133/56, pulse 89, temperature 98 °F (36.7 °C), temperature source Temporal Artery , resp. rate 18, height 162.6 cm (64\"), weight 69.5 kg (153 lb 2 oz), last menstrual period 06/02/2017, SpO2 97 %, currently breastfeeding.    Pt discharged from PACU based on oliver score >8      "

## 2018-03-08 NOTE — LACTATION NOTE
This note was copied from a baby's chart.  40 week male infant, Aaron, delivered vaginally 3/7/18 at 1914. Birth weight 8-3.6. Charted are 3 breastfeeding sessions. Infant due to void and stool. Visit in room to discuss plans. Mother wishes to breastfeed, but unsure of how long. She states Aaron has breast fed well thus far, but he is now sleepy, not wanting to wake to breastfeed. Demonstrated waking techniques and hand expression. Able to easily express drops into infant's mouth. Assisted with positioning and latching. Infant latched well. Deep jaw dropping sucks/swallows observed. Mother complained of pain after 10 minutes of breastfeeding, which improved with adjustment of latch. Gave and reviewed initial breastfeeding packet and book. Discussed nipple care, adequate feedings, milk supply, jaundice, adequate voids/stools, and the need for frequent feeds with breast massage/compressions. Encouragement and support provided. Questions denied.     Maternal Hx: , Former Smoker  Maternal Medications: PNV  Pump: Yes

## 2018-03-08 NOTE — L&D DELIVERY NOTE
[facility]  Vaginal Delivery Note    Delivery     Delivery: Vaginal, Spontaneous Delivery     YOB: 2017    Time of Birth: 7:14 PM      Anesthesia: Epidural     Delivering clinician: Yesica Levine    Forceps?   No   Vacuum? No    Shoulder dystocia present: No        Delivery narrative:  Patient progressed rapidly and pushed for about 45 minutes to  over 1st degree midline laceration.  She also incurred a small R carol-urethral laceration and a L labial laceration.  Spontaneous placenta, grossly intact.  Repair with 3-0 vicryl.  Sponge count confirmed with nurse.    Infant    Findings: male  infant     Infant observations: Weight: 3730 g (8 lb 3.6 oz)   Length: 20  in  Observations/Comments:         Apgars:    @ 1 minute /       @ 5 minutes   Infant Name:      Placenta, Cord, and Fluid    Placenta delivered  Spontaneous  at   3/7  7:17 PM     Cord: 3 vessels  present.   Nuchal Cord?  no   Cord blood obtained: Yes    Cord gases obtained:  Yes    Cord gas results: Venous:  No results found for: PHCVEN    Arterial:  No results found for: PHCART     Repair    Episiotomy: None    Lacerations: Yes  Laceration Information  Laceration Repaired?   Perineal: 1st       Periurethral: right       Labial: left       Sulcus:         Vaginal: No       Cervical: No           Estimated Blood Loss: Est. Blood Loss (mL): 200 mL (Filed from Delivery Summary) (18)     Suture used for repair: 3-0 Vicryl     Complications  none    Disposition  Mother to Mother Baby/Postpartum  in stable condition currently.  Baby to remains with mom  in stable condition currently.      Yesica Levine MD  18  7:34 PM

## 2018-03-08 NOTE — PLAN OF CARE
Problem: Patient Care Overview (Adult)  Goal: Plan of Care Review  Outcome: Ongoing (interventions implemented as appropriate)   18 1804   Coping/Psychosocial Response Interventions   Plan Of Care Reviewed With patient;spouse   Patient Care Overview   Progress progress toward functional goals as expected   Outcome Evaluation   Outcome Summary/Follow up Plan pt is dilated 9.5,100, 0, received epidural, wanting to breastfeed infant.     Goal: Adult Individualization and Mutuality  Outcome: Ongoing (interventions implemented as appropriate)    Goal: Discharge Needs Assessment  Outcome: Ongoing (interventions implemented as appropriate)      Problem: Labor (Cervical Ripen, Induct, Augment) (Adult,Obstetrics,Pediatric)  Goal: Signs and Symptoms of Listed Potential Problems Will be Absent or Manageable (Labor)  Outcome: Ongoing (interventions implemented as appropriate)      Problem: Anesthesia/Analgesia, Neuraxial (Adult)  Goal: Signs and Symptoms of Listed Potential Problems Will be Absent or Manageable (Anesthesia/Analgesia, Neuraxial)  Outcome: Ongoing (interventions implemented as appropriate)      Problem: Fall Risk  (Adult,Obstetrics,Pediatric)  Goal: Identify Related Risk Factors and Signs and Symptoms  Outcome: Ongoing (interventions implemented as appropriate)    Goal: Absence of Maternal Fall  Outcome: Ongoing (interventions implemented as appropriate)    Goal: Absence of [place] Fall/Drop  Outcome: Ongoing (interventions implemented as appropriate)

## 2018-03-09 VITALS
HEART RATE: 84 BPM | OXYGEN SATURATION: 100 % | DIASTOLIC BLOOD PRESSURE: 49 MMHG | WEIGHT: 153.13 LBS | RESPIRATION RATE: 18 BRPM | TEMPERATURE: 98.3 F | BODY MASS INDEX: 26.14 KG/M2 | HEIGHT: 64 IN | SYSTOLIC BLOOD PRESSURE: 110 MMHG

## 2018-03-09 RX ORDER — OXYCODONE HYDROCHLORIDE AND ACETAMINOPHEN 5; 325 MG/1; MG/1
1 TABLET ORAL EVERY 4 HOURS PRN
Qty: 10 TABLET | Refills: 0 | Status: SHIPPED | OUTPATIENT
Start: 2018-03-09 | End: 2018-03-17

## 2018-03-09 RX ADMIN — OXYCODONE HYDROCHLORIDE AND ACETAMINOPHEN 1 TABLET: 5; 325 TABLET ORAL at 10:40

## 2018-03-09 RX ADMIN — IBUPROFEN 600 MG: 200 TABLET, FILM COATED ORAL at 15:12

## 2018-03-09 RX ADMIN — PRENATAL VIT W/ FE FUMARATE-FA TAB 27-0.8 MG 1 TABLET: 27-0.8 TAB at 08:15

## 2018-03-09 RX ADMIN — DOCUSATE SODIUM 100 MG: 100 CAPSULE, LIQUID FILLED ORAL at 08:15

## 2018-03-09 RX ADMIN — IBUPROFEN 600 MG: 200 TABLET, FILM COATED ORAL at 04:34

## 2018-03-09 NOTE — LACTATION NOTE
This note was copied from a baby's chart.  40 week male infant, Aaron, delivered vaginally 3/7/18 at 1914. Birth weight 8-3.6. Day 2 weight loss -4%. 4 voids, 2 stools, and 6 breastfeeds with 1 ml EBM given as well. Mother pumped when infant would not wake to feed and gave 1 ml EBM. Encouraged mother to hand express or pump and feed EBM any time infant does not feed for 3 hours. Discussed weight loss/gain and follow up in 2 weeks with Pediatrician for weight check. Encouraged frequent on demand feedings at least every 3 hours, hand expression, breast massage, and rest for mother. Reviewed engorgement interventions and discussed what to expect the first 2 weeks. Offered support and encouragement to follow up with outpatient lactation if needed.     Maternal Hx: , Former Smoker  Maternal Medications: PNV  Pump: Yes (electric and manual)

## 2018-03-09 NOTE — LACTATION NOTE
This note was copied from a baby's chart.  Mother reports infant would not stay awake to breastfeed. Infant awake upon entering the room. Assisted with latching/positioning. Infant latched well. Deep jaw drops and swallows observed. Discussed hunger cues, waking techniques, hand expression, and the need to keep infant awake with feedings. Encouraged breast massage/compressions with feedings to aid with milk transfer. Mother verbalized understanding. Questions denied.

## 2018-03-09 NOTE — PLAN OF CARE
Problem: Patient Care Overview (Adult)  Goal: Plan of Care Review  Outcome: Ongoing (interventions implemented as appropriate)   03/09/18 0257   Coping/Psychosocial Response Interventions   Plan Of Care Reviewed With patient   Patient Care Overview   Progress improving   Outcome Evaluation   Outcome Summary/Follow up Plan vss. ff/u1/ml. scant lochia. voiding. breastfeeding well this shift. bonding well with infant.     Goal: Adult Individualization and Mutuality  Outcome: Ongoing (interventions implemented as appropriate)    Goal: Discharge Needs Assessment  Outcome: Ongoing (interventions implemented as appropriate)      Problem: Postpartum, Vaginal Delivery (Adult)  Goal: Signs and Symptoms of Listed Potential Problems Will be Absent or Manageable (Postpartum, Vaginal Delivery)  Outcome: Ongoing (interventions implemented as appropriate)

## 2018-03-09 NOTE — PROGRESS NOTES
"[facility]  Vaginal [facility] Progress Note    Subjective   Postpartum Day 2: Vaginal Delivery    The patient feels well.  Her pain is well controlled with nonsteroidal anti-inflammatory drugs and opioid analgesics.   She is ambulating well.  Patient describes her bleeding as thin lochia.    Breastfeeding: infant latching without difficulty without pain.    Objective     Vital Signs Range for the last 24 hours  Temperature: Temp:  [97.5 °F (36.4 °C)-98.4 °F (36.9 °C)] 97.5 °F (36.4 °C)   Temp Source: Temp src: Temporal Artery    BP: BP: (102-133)/(56-69) 114/69   Pulse: Heart Rate:  [84-91] 91   Respirations: Resp:  [18] 18   SPO2: SpO2:  [97 %-100 %] 100 %   O2 Amount (l/min):     O2 Devices O2 Device: room air   Weight:       Admit Height:  Height: 162.6 cm (64\")      Physical Exam:  General:  no acute distresss.  Abdomen: abdomen is soft without significant tenderness, masses, organomegaly or guarding. Fundus: appropriate, firm, non tender  Extremities: normal, atraumatic, no cyanosis, and trace edema.       Lab results reviewed:  Yes   Rubella:  No results found for: RUBELLAIGGIN Nurse Transcribed from prenatal record --  No components found for: EXTRUBELQUAL  Rh Status:    RH type   Date Value Ref Range Status   03/08/2018 Negative  Final     Immunizations:   Immunization History   Administered Date(s) Administered   • Flu Vaccine Intradermal Quad 18-64YR 10/18/2017   • Rho (D) Immune Globulin 01/17/2017, 12/13/2017, 03/08/2018   • Tdap 12/27/2017     Lab Results (last 24 hours)     Procedure Component Value Units Date/Time    CBC & Differential [[id number]] Collected:  03/08/18 0632    Specimen:  Blood Updated:  03/08/18 0732    Narrative:       The following orders were created for panel order CBC & Differential.  Procedure                               Abnormality         Status                     ---------                               -----------         ------                     CBC Auto " Differential[[id number]]        Abnormal            Final result                 Please view results for these tests on the individual orders.    CBC Auto Differential [[id number]]  (Abnormal) Collected:  03/08/18 0632    Specimen:  Blood Updated:  03/08/18 0732     WBC 14.03 (H) 10*3/mm3      RBC 2.71 (L) 10*6/mm3      Hemoglobin 7.3 (L) g/dL      Hematocrit 22.1 (L) %      MCV 81.5 (L) fL      MCH 26.9 (L) pg      MCHC 33.0 g/dL      RDW 12.9 %      RDW-SD 37.4 (L) fl      MPV 10.9 fL      Platelets 172 10*3/mm3      Neutrophil % 77.6 %      Lymphocyte % 14.9 (L) %      Monocyte % 6.6 %      Eosinophil % 0.1 %      Basophil % 0.2 %      Immature Grans % 0.6 %      Neutrophils, Absolute 10.87 (H) 10*3/mm3      Lymphocytes, Absolute 2.09 10*3/mm3      Monocytes, Absolute 0.93 10*3/mm3      Eosinophils, Absolute 0.02 10*3/mm3      Basophils, Absolute 0.03 10*3/mm3      Immature Grans, Absolute 0.09 (H) 10*3/mm3      nRBC 0.0 /100 WBC     Narrative:       ckd          Assessment/Plan     Active Problems:    * No active hospital problems. *      Urbano Pineda is Day 2  post-partum  Vaginal, Spontaneous Delivery    .      Plan:  Discharge home with standard precautions and return to clinic in 4-6 weeks.      Lucas Mckenzie MD  3/9/2018  6:23 AM

## 2018-03-09 NOTE — DISCHARGE SUMMARY
Discharge Summary     Brent Pineda  : 1995  MRN: [id number]  CSN: [id number]    Date of Admission: 3/7/2018   Date of Discharge:  3/9/2018   Delivering Physician: Yesica Levine        Admission Diagnosis: 1. Pregnancy [Z34.90]  2. Normal labor [O80, Z37.9]   Discharge Diagnosis: 1. Pregnancy at 40w2d - delivered       Procedures: 3/7/2018  - Vaginal, Spontaneous Delivery       Hospital Course  Patient is a 22 y.o.  who at 40w2d had a vaginal birth.  Her postpartum course was without complications.  On PPD #2 she was ready for discharge.  She had normal lochia and pain well controlled with oral medications.    Infant  male  fetus weighing 3730 g (8 lb 3.6 oz)   Apgars -  7  @ 1 minute /  9  @ 5 minutes.    Discharge labs  Lab Results   Component Value Date    WBC 14.03 (H) 2018    HGB 7.3 (L) 2018    HCT 22.1 (L) 2018     2018       Discharge Medications   Urbano Pineda   Home Medication Instructions NATALIO:[id number]    Printed on:18 0624   Medication Information                      oxyCODONE-acetaminophen (PERCOCET) 5-325 MG per tablet  Take 1 tablet by mouth Every 4 (Four) Hours As Needed for Moderate Pain  for up to 8 days.             Prenatal Vit-Fe Fumarate-FA (PNV PRENATAL PLUS MULTIVITAMIN) 27-1 MG tablet  TAKE 1 TABLET BY MOUTH EVERY DAY                 Discharge Disposition Home or Self Care   Condition on Discharge: good   Follow-up: 6 weeks with Abner Mckenzie MD  3/9/2018

## 2018-03-12 NOTE — PAYOR COMM NOTE
"AL HOME 3-9-18    [id number]  Urbano Lopez (22 y.o. Female)     Date of Birth Social Security Number Address Home Phone MRN    1995  [address] [address] [phone] [id number]    Druze Marital Status          None Single       Admission Date Admission Type Admitting Provider Attending Provider Department, Room/Bed    3/7/18 Elective Yesica Levine MD  [facility] MOTHER BABY 2A, M225/1    Discharge Date Discharge Disposition Discharge Destination        3/9/2018 Home or Self Care              Attending Provider:  (none)   Allergies:  Cephalexin    Isolation:  None   Infection:  None   Code Status:  Prior    Ht:  162.6 cm (64\")   Wt:  69.5 kg (153 lb 2 oz)    Admission Cmt:  None   Principal Problem:  None                Active Insurance as of 3/7/2018     Primary Coverage     Payor Plan Insurance Group Employer/Plan Group    [facility] [id number]     Payor Plan Address Payor Plan Phone Number Effective From Effective To    [address] [address] [phone] 2017     [place] [address]       Subscriber Name Subscriber Birth Date Member ID       RAPHAEL LOPEZ 1974 [id number]           Secondary Coverage     Payor Plan Insurance Group Employer/Plan Group    [employer] [place] [employer]      Payor Plan Address Payor Plan Phone Number Effective From Effective To    [address] [address] [phone] 2017     [place] [address]       Subscriber Name Subscriber Birth Date Member ID       URBANO LOPEZ 1995 [id number]                 Emergency Contacts      (Rel.) Home Phone Work Phone Mobile Phone    Tanisha Merritt (Mother) [phone] -- --               Discharge Summary      Lucas Mckenzie MD at 3/9/2018  6:24 AM          Discharge Summary     Brent Mcmahonger  : 1995  MRN: [id number]  CSN: [id number]    Date of Admission: 3/7/2018   Date of Discharge:  3/9/2018   Delivering Physician: Yesica Levine        Admission Diagnosis: " 1. Pregnancy [Z34.90]  2. Normal labor [O80, Z37.9]   Discharge Diagnosis: 1. Pregnancy at 40w2d - delivered       Procedures: 3/7/2018  - Vaginal, Spontaneous Delivery       Hospital Course  Patient is a 22 y.o.  who at 40w2d had a vaginal birth.  Her postpartum course was without complications.  On PPD #2 she was ready for discharge.  She had normal lochia and pain well controlled with oral medications.    Infant  male  fetus weighing 3730 g (8 lb 3.6 oz)   Apgars -  7  @ 1 minute /  9  @ 5 minutes.    Discharge labs  Lab Results   Component Value Date    WBC 14.03 (H) 2018    HGB 7.3 (L) 2018    HCT 22.1 (L) 2018     2018       Discharge Medications   Urbano Pineda   Home Medication Instructions NATALIO:[id number]    Printed on:18 0624   Medication Information                      oxyCODONE-acetaminophen (PERCOCET) 5-325 MG per tablet  Take 1 tablet by mouth Every 4 (Four) Hours As Needed for Moderate Pain  for up to 8 days.             Prenatal Vit-Fe Fumarate-FA (PNV PRENATAL PLUS MULTIVITAMIN) 27-1 MG tablet  TAKE 1 TABLET BY MOUTH EVERY DAY                 Discharge Disposition Home or Self Care   Condition on Discharge: good   Follow-up: 6 weeks with Abner Mckenzie MD  3/9/2018      Electronically signed by Lucas Mckenzie MD at 3/9/2018  6:24 AM

## 2018-03-13 LAB
CYTO UR: NORMAL
LAB AP CASE REPORT: NORMAL
LAB AP CLINICAL INFORMATION: NORMAL
Lab: NORMAL
PATH REPORT.FINAL DX SPEC: NORMAL
PATH REPORT.GROSS SPEC: NORMAL

## 2018-04-17 ENCOUNTER — POSTPARTUM VISIT (OUTPATIENT)
Dept: OBSTETRICS AND GYNECOLOGY | Facility: CLINIC | Age: 23
End: 2018-04-17

## 2018-04-17 VITALS
BODY MASS INDEX: 21.51 KG/M2 | WEIGHT: 126 LBS | HEIGHT: 64 IN | DIASTOLIC BLOOD PRESSURE: 74 MMHG | SYSTOLIC BLOOD PRESSURE: 110 MMHG

## 2018-04-17 PROCEDURE — 0503F POSTPARTUM CARE VISIT: CPT | Performed by: OBSTETRICS & GYNECOLOGY

## 2018-04-17 RX ORDER — NORETHINDRONE ACETATE AND ETHINYL ESTRADIOL AND FERROUS FUMARATE 1MG-20(24)
1 KIT ORAL DAILY
Qty: 28 TABLET | Refills: 12 | Status: SHIPPED | OUTPATIENT
Start: 2018-04-17 | End: 2018-10-17

## 2018-04-17 RX ORDER — ESCITALOPRAM OXALATE 10 MG/1
10 TABLET ORAL DAILY
Qty: 30 TABLET | Refills: 2 | Status: SHIPPED | OUTPATIENT
Start: 2018-04-17 | End: 2018-10-17

## 2018-04-17 NOTE — PROGRESS NOTES
"Subjective   Chief Complaint   Patient presents with   • Postpartum Care     Pt had a baby boy named Randy who is bottle fed. Pt would like to discuss birth control and depression medication.     Urbano Pineda is a 22 y.o. year old  presenting to be seen for her postpartum visit.  She had a vaginal delivery.   Prenatal course was been benign.    Since delivery she has not been sexually active.  She does have concerns about post-partum blues/depression. The patient reports that she is crying over everything and does not want to be around people.  She reports that it got worse when her milk dried up and she quit breastfeeding.  Patient does feel like she is bonding well with baby  She is bottle feeding.    The following portions of the patient's history were reviewed and updated as appropriate:current medications and allergies    Smoking status: Former Smoker                                                              Packs/day: 0.00      Years: 0.00         Types: Cigarettes  Smokeless tobacco: Never Used                        Review of Systems   Respiratory: Negative for shortness of breath.    Cardiovascular: Negative for chest pain.   Genitourinary: Negative for pelvic pain, vaginal bleeding and vaginal pain.   Psychiatric/Behavioral: Positive for dysphoric mood. Negative for self-injury, sleep disturbance and suicidal ideas. The patient is nervous/anxious (\"kind of\").          Objective   /74 (BP Location: Right arm, Patient Position: Sitting, Cuff Size: Adult)   Ht 162.6 cm (64\")   Wt 57.2 kg (126 lb)   LMP 2017 (Exact Date)   Breastfeeding? No   BMI 21.63 kg/m²     General:  well developed; well nourished  no acute distress   Abdomen: soft, non-tender; no masses   Pelvis: Clinical staff was present for exam          Assessment   1. Normal 6 week postpartum exam  2. Depression, patient has been on medication in the past but does not remember what it was     Plan   1. BC options " reviewed and compared today: Depo-Provera, IUD - Mirena, OCP (estrogen/progesterone) and Ortho-Evra  2. Patient prefers OCP's.  She has used Depo, Ortho Evra and pills in the past.  3. Lexapro to pharmacy.  Patient to call if symptoms worsen or do not improve over next few weeks.  4. RTO in 3 months for annual exam.    No orders of the defined types were placed in this encounter.         This note was electronically signed.    Yesica Levine MD  April 17, 2018

## 2018-10-17 ENCOUNTER — OFFICE VISIT (OUTPATIENT)
Dept: OBSTETRICS AND GYNECOLOGY | Facility: CLINIC | Age: 23
End: 2018-10-17

## 2018-10-17 VITALS
WEIGHT: 125 LBS | SYSTOLIC BLOOD PRESSURE: 102 MMHG | DIASTOLIC BLOOD PRESSURE: 60 MMHG | HEIGHT: 64 IN | BODY MASS INDEX: 21.34 KG/M2

## 2018-10-17 DIAGNOSIS — F41.9 ANXIETY: ICD-10-CM

## 2018-10-17 DIAGNOSIS — F17.200 SMOKER: ICD-10-CM

## 2018-10-17 DIAGNOSIS — Z01.419 ENCOUNTER FOR GYNECOLOGICAL EXAMINATION WITHOUT ABNORMAL FINDING: Primary | ICD-10-CM

## 2018-10-17 PROCEDURE — 99395 PREV VISIT EST AGE 18-39: CPT | Performed by: OBSTETRICS & GYNECOLOGY

## 2018-10-17 PROCEDURE — G0123 SCREEN CERV/VAG THIN LAYER: HCPCS | Performed by: OBSTETRICS & GYNECOLOGY

## 2018-10-17 RX ORDER — BUSPIRONE HYDROCHLORIDE 15 MG/1
7.5 TABLET ORAL 3 TIMES DAILY PRN
Qty: 90 TABLET | Refills: 2 | Status: SHIPPED | OUTPATIENT
Start: 2018-10-17 | End: 2020-10-07

## 2018-10-17 RX ORDER — NORELGESTROMIN AND ETHINYL ESTRADIOL 150; 35 UG/D; UG/D
PATCH TRANSDERMAL
Refills: 2 | COMMUNITY
Start: 2018-09-07 | End: 2020-10-07

## 2018-10-17 NOTE — PROGRESS NOTES
"Subjective      Urbano Pineda is a 22 y.o. female who presents for her routine annual exam. Periods are regular every 28-30 days on Ortho Evra.  Current contraception: Ortho-Evra patches weekly  Regular self breast exam: no  History of abnormal Pap smear: N/A  History of abnormal mammogram: N/A  Exercise: occasionally    Menstrual History:  OB History      Para Term  AB Living    3       2      SAB TAB Ectopic Molar Multiple Live Births    1   1               Patient's last menstrual period was 10/05/2018 (exact date).       The following portions of the patient's history were reviewed and updated as appropriate: allergies, current medications, past family history, past medical history, past social history, past surgical history and problem list.    heterosexual    Family History  Family History   Problem Relation Age of Onset   • No Known Problems Father    • No Known Problems Mother    • No Known Problems Brother    • No Known Problems Sister    • No Known Problems Paternal Grandfather    • No Known Problems Paternal Grandmother    • No Known Problems Maternal Grandmother        Review of Systems  Review of Systems   Constitutional: Negative for activity change and unexpected weight loss.   HENT: Negative for congestion.    Respiratory: Negative for shortness of breath.    Cardiovascular: Negative for chest pain.   Gastrointestinal: Negative for blood in stool, constipation and diarrhea.   Endocrine: Negative for cold intolerance and heat intolerance.   Genitourinary: Negative for dyspareunia, menstrual problem, pelvic pain and vaginal discharge.   Musculoskeletal: Negative for arthralgias, back pain, neck pain and neck stiffness.   Skin: Negative for rash.   Neurological: Negative for dizziness and headache.   Psychiatric/Behavioral: Negative for sleep disturbance and depressed mood. The patient is nervous/anxious.              Objective        /60   Ht 162.6 cm (64\")   Wt 56.7 kg (125 lb)  "  LMP 10/05/2018 (Exact Date)   BMI 21.46 kg/m²   Physical Exam   Constitutional: She is oriented to person, place, and time. She appears well-developed and well-nourished. No distress.   HENT:   Head: Normocephalic and atraumatic.   Eyes: EOM are normal.   Neck: Normal range of motion. Neck supple.   Cardiovascular: Normal rate and regular rhythm.    No murmur heard.  Pulmonary/Chest: Effort normal and breath sounds normal. Right breast exhibits no inverted nipple and no mass. Left breast exhibits no inverted nipple and no mass.   Abdominal: Soft. She exhibits no distension. There is no tenderness.   Genitourinary: Vagina normal and uterus normal. Pelvic exam was performed with patient supine. There is no tenderness or lesion on the right labia. There is no tenderness or lesion on the left labia. Cervix does not exhibit motion tenderness, discharge or friability. Right adnexum displays no tenderness and no fullness. Left adnexum displays no tenderness and no fullness. No tenderness or bleeding in the vagina. No vaginal discharge found.   Musculoskeletal: Normal range of motion. She exhibits no edema.   Neurological: She is alert and oriented to person, place, and time.   Skin: Skin is warm and dry.   Psychiatric: She has a normal mood and affect. Her behavior is normal. Judgment normal.   Nursing note and vitals reviewed.            Assessment  & Plan    Urbano was seen today for annual exam.    Diagnoses and all orders for this visit:    Encounter for gynecological examination without abnormal finding: Currently using Ortho Evra patch.  Exam unremarkable.  SBE encouraged.    Anxiety:  Starting Buspar tid prn    Smoker:  Smoking cessation discussed    Other orders  -     busPIRone (BUSPAR) 15 MG tablet; Take 0.5 tablets by mouth 3 (Three) Times a Day As Needed (anxiety).      This note was electronically signed.    Yesica Levine MD  10/17/2018  2:19 PM

## 2018-10-24 LAB
GEN CATEG CVX/VAG CYTO-IMP: NORMAL
LAB AP CASE REPORT: NORMAL
LAB AP GYN ADDITIONAL INFORMATION: NORMAL
LAB AP GYN OTHER FINDINGS: NORMAL
PATH INTERP SPEC-IMP: NORMAL
STAT OF ADQ CVX/VAG CYTO-IMP: NORMAL

## 2019-08-06 ENCOUNTER — LAB (OUTPATIENT)
Dept: LAB | Facility: HOSPITAL | Age: 24
End: 2019-08-06

## 2019-08-06 ENCOUNTER — TRANSCRIBE ORDERS (OUTPATIENT)
Dept: LAB | Facility: HOSPITAL | Age: 24
End: 2019-08-06

## 2019-08-06 DIAGNOSIS — Z11.3 ENCOUNTER FOR SPECIAL SCREENING EXAMINATION FOR INFECTION WITH PREDOMINANTLY SEXUAL MODE OF TRANSMISSION: Primary | ICD-10-CM

## 2019-08-06 DIAGNOSIS — Z11.3 ENCOUNTER FOR SPECIAL SCREENING EXAMINATION FOR INFECTION WITH PREDOMINANTLY SEXUAL MODE OF TRANSMISSION: ICD-10-CM

## 2019-08-06 LAB — B-HCG UR QL: NEGATIVE

## 2019-08-06 PROCEDURE — 86592 SYPHILIS TEST NON-TREP QUAL: CPT | Performed by: PEDIATRICS

## 2019-08-06 PROCEDURE — 81025 URINE PREGNANCY TEST: CPT

## 2019-08-06 PROCEDURE — 36415 COLL VENOUS BLD VENIPUNCTURE: CPT

## 2019-08-06 PROCEDURE — 86695 HERPES SIMPLEX TYPE 1 TEST: CPT | Performed by: PEDIATRICS

## 2019-08-06 PROCEDURE — 86696 HERPES SIMPLEX TYPE 2 TEST: CPT | Performed by: PEDIATRICS

## 2019-08-06 PROCEDURE — 87591 N.GONORRHOEAE DNA AMP PROB: CPT | Performed by: PEDIATRICS

## 2019-08-06 PROCEDURE — 80074 ACUTE HEPATITIS PANEL: CPT | Performed by: PEDIATRICS

## 2019-08-06 PROCEDURE — 87389 HIV-1 AG W/HIV-1&-2 AB AG IA: CPT | Performed by: PEDIATRICS

## 2019-08-06 PROCEDURE — 87491 CHLMYD TRACH DNA AMP PROBE: CPT | Performed by: PEDIATRICS

## 2019-08-07 LAB
HAV IGM SERPL QL IA: NEGATIVE
HBV CORE IGM SERPL QL IA: NEGATIVE
HBV SURFACE AG SERPL QL IA: NEGATIVE
HCV AB SER DONR QL: NEGATIVE
HCV S/C RATIO: 0.04 (ref 0–0.99)

## 2019-08-08 LAB
HIV 1+2 AB+HIV1 P24 AG SERPL QL IA: NON REACTIVE
HSV1 IGG SER IA-ACNC: <0.91 INDEX (ref 0–0.9)
HSV2 IGG SER IA-ACNC: <0.91 INDEX (ref 0–0.9)
RPR SER QL: NON REACTIVE

## 2019-08-09 LAB
C TRACH RRNA SPEC DONR QL NAA+PROBE: NEGATIVE
HSV1 IGM TITR SER IF: NORMAL TITER
HSV2 IGM TITR SER IF: NORMAL TITER
N GONORRHOEA DNA SPEC QL NAA+PROBE: NEGATIVE

## 2020-03-08 ENCOUNTER — HOSPITAL ENCOUNTER (EMERGENCY)
Facility: HOSPITAL | Age: 25
Discharge: HOME | End: 2020-03-08
Admitting: EMERGENCY MEDICINE

## 2020-03-08 ENCOUNTER — APPOINTMENT (OUTPATIENT)
Dept: GENERAL RADIOLOGY | Facility: HOSPITAL | Age: 25
End: 2020-03-08

## 2020-03-08 ENCOUNTER — APPOINTMENT (OUTPATIENT)
Dept: CT IMAGING | Facility: HOSPITAL | Age: 25
End: 2020-03-08

## 2020-03-08 VITALS
SYSTOLIC BLOOD PRESSURE: 91 MMHG | HEART RATE: 77 BPM | BODY MASS INDEX: 18.44 KG/M2 | HEIGHT: 64 IN | RESPIRATION RATE: 15 BRPM | TEMPERATURE: 98.2 F | WEIGHT: 108 LBS | OXYGEN SATURATION: 100 % | DIASTOLIC BLOOD PRESSURE: 45 MMHG

## 2020-03-08 DIAGNOSIS — T74.91XA DOMESTIC VIOLENCE OF ADULT, INITIAL ENCOUNTER: Primary | ICD-10-CM

## 2020-03-08 DIAGNOSIS — S00.03XA CONTUSION OF SCALP, INITIAL ENCOUNTER: ICD-10-CM

## 2020-03-08 DIAGNOSIS — S00.83XA CONTUSION OF FACE, INITIAL ENCOUNTER: ICD-10-CM

## 2020-03-08 LAB
B-HCG UR QL: NEGATIVE
INTERNAL NEGATIVE CONTROL: NEGATIVE
INTERNAL POSITIVE CONTROL: POSITIVE
Lab: NORMAL

## 2020-03-08 PROCEDURE — 99283 EMERGENCY DEPT VISIT LOW MDM: CPT

## 2020-03-08 PROCEDURE — 81025 URINE PREGNANCY TEST: CPT | Performed by: NURSE PRACTITIONER

## 2020-03-08 PROCEDURE — 70450 CT HEAD/BRAIN W/O DYE: CPT

## 2020-03-08 PROCEDURE — 70486 CT MAXILLOFACIAL W/O DYE: CPT

## 2020-03-08 PROCEDURE — 73502 X-RAY EXAM HIP UNI 2-3 VIEWS: CPT

## 2020-03-08 PROCEDURE — 63710000001 ONDANSETRON ODT 4 MG TABLET DISPERSIBLE: Performed by: NURSE PRACTITIONER

## 2020-03-08 RX ORDER — ONDANSETRON 4 MG/1
4 TABLET, ORALLY DISINTEGRATING ORAL EVERY 6 HOURS PRN
Qty: 10 TABLET | Refills: 0 | Status: SHIPPED | OUTPATIENT
Start: 2020-03-08 | End: 2020-10-07

## 2020-03-08 RX ORDER — ONDANSETRON 4 MG/1
4 TABLET, ORALLY DISINTEGRATING ORAL ONCE
Status: COMPLETED | OUTPATIENT
Start: 2020-03-08 | End: 2020-03-08

## 2020-03-08 RX ADMIN — ONDANSETRON 4 MG: 4 TABLET, ORALLY DISINTEGRATING ORAL at 12:49
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
## 2020-03-08 NOTE — DISCHARGE INSTRUCTIONS
Return to ER if symptoms worsen   Ice to the face    Contusion  A contusion is a deep bruise. This is a result of an injury that causes bleeding under the skin. Symptoms of bruising include pain, swelling, and discolored skin. The skin may turn blue, purple, or yellow.  Follow these instructions at home:  Managing pain, stiffness, and swelling  You may use RICE. This stands for:  · Resting.  · Icing.  · Compression, or putting pressure.  · Elevating, or raising the injured area.  To follow this method, do these actions:  · Rest the injured area.  · If told, put ice on the injured area.  ? Put ice in a plastic bag.  ? Place a towel between your skin and the bag.  ? Leave the ice on for 20 minutes, 2-3 times per day.  · If told, put light pressure (compression) on the injured area using an elastic bandage. Make sure the bandage is not too tight. If the area tingles or becomes numb, remove it and put it back on as told by your doctor.  · If possible, raise (elevate) the injured area above the level of your heart while you are sitting or lying down.    General instructions  · Take over-the-counter and prescription medicines only as told by your doctor.  · Keep all follow-up visits as told by your doctor. This is important.  Contact a doctor if:  · Your symptoms do not get better after several days of treatment.  · Your symptoms get worse.  · You have trouble moving the injured area.  Get help right away if:  · You have very bad pain.  · You have a loss of feeling (numbness) in a hand or foot.  · Your hand or foot turns pale or cold.  Summary  · A contusion is a deep bruise. This is a result of an injury that causes bleeding under the skin.  · Symptoms of bruising include pain, swelling, and discolored skin. The skin may turn blue, purple, or yellow.  · This condition is treated with rest, ice, compression, and elevation. This is also called RICE. You may be given over-the-counter medicines for pain.  · Contact a doctor  if you do not feel better, or you feel worse. Get help right away if you have very bad pain, have lost feeling in a hand or foot, or the area turns pale or cold.  This information is not intended to replace advice given to you by your health care provider. Make sure you discuss any questions you have with your health care provider.  Document Released: 06/05/2009 Document Revised: 08/09/2019 Document Reviewed: 08/09/2019  [employer] Interactive Patient Education © 2020 [employer]      Facial or Scalp Contusion    A facial or scalp contusion is a deep bruise (contusion) on the face or head. Bruises happen when an injury causes bleeding under the skin. The bruise may turn blue, purple, or yellow. Minor injuries will cause a bruise that is not painful, but worse bruises can stay painful and swollen for a few weeks.  Follow these instructions at home:  · Take over-the-counter and prescription medicines only as told by your doctor.  · If directed, apply ice to the injured area.  ? Put ice in a plastic bag.  ? Place a towel between your skin and the bag.  ? Leave the ice on for 20 minutes, 2-3 times a day.  · Keep all follow-up visits as told by your doctor. This is important.  Contact a doctor if:  · You have trouble biting or chewing.  · Your pain or swelling gets worse.  · You have pain when you move your eyes.  Get help right away if:  · You have very bad pain or a headache, and medicine does not help.  · You are very tired or confused, or your personality changes.  · You throw up (vomit).  · You have a nosebleed that does not stop.  · You see two of everything (double vision) or have blurry vision.  · You have clear fluid coming from your nose or ear, and it does not go away.  · You have problems walking or using your arms or legs.  · You are very dizzy.  Summary  · A facial or scalp contusion is a deep bruise (contusion) on the face or head.  · Bruises happen when an injury causes bleeding under the skin.  · Minor  injuries will cause a bruise that is not painful, but worse bruises can stay painful and swollen for a few weeks.  This information is not intended to replace advice given to you by your health care provider. Make sure you discuss any questions you have with your health care provider.  Document Released: 12/06/2012 Document Revised: 11/07/2017 Document Reviewed: 11/07/2017  [employer] Interactive Patient Education © 2020 [employer]      Head Injury, Adult  There are many types of head injuries. They can be as minor as a bump. Some head injuries can be worse. Worse injuries include:  · A strong hit to the head that hurts the brain (concussion).  · A bruise of the brain (contusion). This means there is bleeding in the brain that can cause swelling.  · A cracked skull (skull fracture).  · Bleeding in the brain that gathers, gets thick (makes a clot), and forms a bump (hematoma).  Most problems from a head injury come in the first 24 hours. However, you may still have side effects up to 7-10 days after your injury. It is important to watch your condition for any changes.  Follow these instructions at home:  Activity  · Rest as much as possible.  · Avoid activities that are hard or tiring.  · Make sure you get enough sleep.  · Limit activities that need a lot of thought or attention, such as:  ? Watching TV.  ? Playing memory games and puzzles.  ? Job-related work or homework.  ? Working on the computer, social media, and texting.  · Avoid activities that could cause another head injury until your doctor says it is okay. This includes playing sports.  · Ask your doctor when it is safe for you to go back to your normal activities, such as work or school. Ask your doctor for a step-by-step plan for slowly going back to your normal activities.  · Ask your doctor when you can drive, ride a bicycle, or use heavy machinery. Never do these activities if you are dizzy.  Lifestyle  · Do not drink alcohol until your doctor says  it is okay.  · Avoid drug use.  · If it is harder than usual to remember things, write them down.  · If you are easily distracted, try to do one thing at a time.  · Talk with family members or close friends when making important decisions.  · Tell your friends, family, a trusted coworker, and  about your injury, symptoms, and limits (restrictions). Have them watch for any problems that are new or getting worse.  General instructions  · Take over-the-counter and prescription medicines only as told by your doctor.  · Have someone stay with you for 24 hours after your head injury. This person should watch you for any changes in your symptoms and be ready to get help.  · Keep all follow-up visits as told by your doctor. This is important.  How is this prevented?  · Work on your balance and strength. This can help you avoid falls.  · Wear a seatbelt when you are in a moving vehicle.  · Wear a helmet when:  ? Riding a bicycle.  ? Skiing.  ? Doing any other sport or activity that has a risk of injury.  · Drink alcohol only in moderation.  · Make your home safer by:  ? Getting rid of clutter from the floors and stairs, like things that can make you trip.  ? Using grab bars in bathrooms and handrails by stairs.  ? Placing non-slip mats on floors and in bathtubs.  ? Putting more light in dim areas.  Get help right away if:  · You have:  ? A very bad (severe) headache that is not helped by medicine.  ? Trouble walking or weakness in your arms and legs.  ? Clear or bloody fluid coming from your nose or ears.  ? Changes in your seeing (vision).  ? Jerky movements that you cannot control (seizure).  · You throw up (vomit).  · Your symptoms get worse.  · You lose balance.  · Your speech is slurred.  · You pass out.  · You are sleepier and have trouble staying awake.  · The black centers of your eyes (pupils) change in size.  These symptoms may be an emergency. Do not wait to see if the symptoms will go away. Get  medical help right away. Call your local emergency services (911 in the [place] Do not drive yourself to the hospital.  This information is not intended to replace advice given to you by your health care provider. Make sure you discuss any questions you have with your health care provider.  Document Released: 11/30/2009 Document Revised: 09/11/2019 Document Reviewed: 06/27/2017  [employer] Interactive Patient Education © 2019 [employer]      Intimate Partner Violence Information  Intimate partner violence, also called domestic abuse or relationship abuse, is a pattern of behaviors used by one partner to gain or maintain power and control over the other partner. Intimate partner violence can happen to women and men and can happen between people who are or were:  · .  · Dating.  · Living together.  What are the types of intimate partner violence?  Intimate partner violence can involve physical, emotional, psychological, sexual, and economic abuse, or stalking by a current or former partner. Different types of abuse can occur at the same time within the same relationship.  · Physical abuse. This includes rough handling, threats with a weapon, throwing objects, pushing, or hitting.  · Emotional and psychological abuse. This includes verbal attacks, rejection, humiliation, intimidation, social isolation, or threats. Abuse may also include limiting contact with family and friends.  · Sexual assault. Sexual assault is any unwanted sexual activity that occurs without clear permission (consent) from both people. This includes unwanted touching and sexual harassment.  · Economic abuse. This includes controlling money, food, transportation, or other belongings.  · Stalking. This involves such things as repeated, unwanted phone calls, e-mails, or text messages, or watching the victim from a distance.  What are some warning signs of intimate partner violence?  Physical signs  · Bruises.  · Broken bones.  · Burns or  cuts.  · Physical pain.  · Head injury.  Emotional and psychological signs  · Crying.  · Depression.  · Hopelessness.  · Desperation.  · Trouble sleeping.  · Fear of the partner.  · Anxiety.  · Suicidal thoughts or behavior.  · Antisocial behavior.  · Low self-esteem.  · Fear of intimacy.  · Flashbacks.  Sexual signs  · Bruising, swelling, or bleeding of the genital or rectal area.  · Signs of an STI, such as genital sores, warts, or discharge coming from the genital area.  · Pain in the genital area.  · Unintended pregnancy.  · Problems with pregnancy.  What are common behaviors of those affected by intimate partner violence?  Those affected by intimate partner violence may:  · Be late to work or other events.  · Not show up to places as promised.  · Have to let their partner know where they are and who they are with.  · Be isolated or kept from seeing friends or family.  · Make comments about their partner's temper or behavior.  · Make excuses for their partner.  · Engage in high-risk sexual behaviors.  · Use drugs or alcohol.  · Have unhealthy eating behaviors.  What are common feelings of those affected by intimate partner violence?  Victims of intimate partner violence may feel that they:  · Must be careful not to say or do things that trigger their partner's anger.  · Cannot do anything right.  · Deserve to be treated badly.  · Overreact to their partner's behavior or temper.  · Cannot trust their own feelings.  · Cannot trust other people.  · Are trapped.  · May have their children taken away by their partner.  · Are emotionally drained or numb.  · Are in danger.  · Might have to kill their partner to survive.  Where can you get help?  If you do not feel safe searching for help online at home, use a computer at a [employer] to access the Internet. Call 911 if you are in immediate danger or need medical help.  Intimate partner violence hotlines and websites  · The National Domestic Violence  Hotline.  ? 24-hour phone hotline: 1-484.831.1599 (SAFE) or 1-428.482.5579 (TTY).  ? [employer]ide[employer]: available Monday through Friday, 9 a.m. to 5 p.m. Call 1-492.451.3739.  ? [employer]  · The National Sexual Assault Hotline.  ? 24-hour phone hotline: 1-547.550.1358.  ? [employer]org  Shelters for victims of intimate partner violence  If you are a victim of intimate partner violence, there are resources to help you find a temporary place for you and your children to live (shelter). The specific address of these shelters is often not known to the public.  Police  Report assaults, threats, and stalking to the police.  Counselors and counseling centers    Counseling can help you cope with difficult emotions and empower you to plan for your future safety. The topics you discuss with a counselor are private and confidential. Children of intimate partner violence victims also might need counseling to manage stress and anxiety.  The court system  You can work with a  or an advocate to get legal protection against an abuser. Protection includes restraining orders and private addresses. Crimes against you, such as assault, can also be prosecuted through the courts. Laws vary by state.  Follow these instructions at home:  · Create a safety plan that includes ways to remain safe while you are in an abusive relationship, while you are planning to leave, or after you leave. This plan may be created by the victim alone or with assistance from the domestic violence hotline staff or local shelter staff. Your safety plan may include:  ? How to cope with emotions.  ? How to tell friends and family about the abuse.  ? How to take legal action.  ? How to create a safe home environment.  ? How to keep your children safe.  ? Emergency plans for life-threatening situations.  Get help right away if you:  · Feel like you are in immediate danger.  · Feel like you may hurt yourself or others.  If you ever feel like you may hurt  yourself or others, or have thoughts about taking your own life, get help right away. You can go to your nearest emergency department or call:  · Your local emergency services (911 in the [place]  · A suicide crisis helpline, such as the National Suicide Prevention Lifeline at 1-801.380.9259. This is open 24 hours a day.  Summary  · If you are a victim of intimate partner violence, there are resources to help you find a temporary place for you and your children to live (shelter).  · Create a safety plan that includes ways to remain safe while you are in an abusive relationship, while you are planning to leave, or after you leave.  This information is not intended to replace advice given to you by your health care provider. Make sure you discuss any questions you have with your health care provider.  Document Released: 03/09/2005 Document Revised: 02/01/2019 Document Reviewed: 02/01/2019  [employer] Interactive Patient Education © 2020 [employer]      How to Use Cold Therapy  Cold therapy, also known as cryotherapy, is a treatment that uses cold temperatures to treat an injury or medical condition. It includes using cold packs or ice packs to reduce pain and swelling.  What are the risks?  Generally, cold therapy is a safe treatment. However, it is not safe for:  · People who cannot express pain, such as small children and people who have dementia.  · People who have certain conditions, such as:  ? A problem in the vessels that slows blood flow to the fingers and toes (Raynaud's syndrome).  ? Feeling very cold easily (cold hypersensitivity).  ? Numbness or lack of feeling in the area being iced.  Cold therapy may be unsafe for people who have other conditions. Do not use cold therapy without your health care provider's approval if you have:  · A heart condition.  · High blood pressure.  · Open or healing wounds.  · An infection.  · Rheumatoid arthritis.  · Poor circulation.  · Diabetes.  · Certain skin  conditions.  How can I make a cold pack?  When using a cold pack at home to reduce pain and swelling, you can use:  · A silica gel cold pack that has been left in the freezer. You can buy this online or in stores.  · A sealable plastic bag that has been filled with crushed ice.  · A washcloth or paper towels soaked in cold (or ice) water.  · A plastic bag of frozen vegetables. Discard when finished using them as a cold pack.  Supplies needed:  · A cold pack.  · A towel. This can be dry or damp, depending on your preference and comfort.  How to use cold therapy    1. Have your cold pack ready.  2. Place a towel between the cold pack and your skin, or wrap the cold pack in a towel.  3. Apply the cold pack to the affected area. Apply for no more than 20 minutes at a time.  4. Check your skin after 5 minutes to make sure that there is no skin damage or other signs of problems. Check for:  ? White spots on your skin. Your skin may look blotchy or mottled.  ? Skin that looks blue or pale.  ? Skin that feels waxy or hard.  5. Repeat these steps as many times each day as told by your health care provider.  Always use a towel to avoid direct contact with your skin.  Contact a health care provider if:  · You develop white spots on your skin. This may give your skin a blotchy or mottled look.  · Your skin turns blue or pale.  · Your skin becomes waxy or hard.  · Your swelling gets worse.  Summary  · Cold therapy, or cryotherapy, is used to treat an injury or medical condition. It includes using cold packs or ice packs to reduce pain and swelling.  · Cold therapy is not safe if you are unable to express pain or have certain conditions.  · When using cold packs or ice packs, always place a towel between the cold source and your skin.  · Check your skin after 5 minutes of icing it to make sure that there is no skin damage or other signs of problems.  · Contact a health care provider if you notice changes in your skin or your  swelling gets worse.  This information is not intended to replace advice given to you by your health care provider. Make sure you discuss any questions you have with your health care provider.  Document Released: 08/13/2012 Document Revised: 09/16/2019 Document Reviewed: 09/16/2019  [employer] Interactive Patient Education © 2020 [employer]
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
## 2020-04-16 ENCOUNTER — LAB (OUTPATIENT)
Dept: LAB | Facility: HOSPITAL | Age: 25
End: 2020-04-16

## 2020-04-16 ENCOUNTER — TRANSCRIBE ORDERS (OUTPATIENT)
Dept: LAB | Facility: HOSPITAL | Age: 25
End: 2020-04-16

## 2020-04-16 DIAGNOSIS — R53.83 OTHER FATIGUE: ICD-10-CM

## 2020-04-16 DIAGNOSIS — R53.83 OTHER FATIGUE: Primary | ICD-10-CM

## 2020-04-16 LAB
ALBUMIN SERPL-MCNC: 4.7 G/DL (ref 3.5–5)
ALBUMIN/GLOB SERPL: 1.4 G/DL (ref 1.1–2.5)
ALP SERPL-CCNC: 66 U/L (ref 24–120)
ALT SERPL W P-5'-P-CCNC: 19 U/L (ref 0–35)
ANION GAP SERPL CALCULATED.3IONS-SCNC: 11 MMOL/L (ref 4–13)
AST SERPL-CCNC: 21 U/L (ref 7–45)
AUTO MIXED CELLS #: 0.5 10*3/MM3 (ref 0.1–2.6)
AUTO MIXED CELLS %: 7.8 % (ref 0.1–24)
B-HCG UR QL: NEGATIVE
BILIRUB SERPL-MCNC: 0.6 MG/DL (ref 0.1–1)
BILIRUB UR QL STRIP: NEGATIVE
BUN BLD-MCNC: 9 MG/DL (ref 5–21)
BUN/CREAT SERPL: 12.7
CALCIUM SPEC-SCNC: 9.2 MG/DL (ref 8.4–10.4)
CHLORIDE SERPL-SCNC: 100 MMOL/L (ref 98–110)
CLARITY UR: CLEAR
CO2 SERPL-SCNC: 30 MMOL/L (ref 24–31)
COLOR UR: YELLOW
CREAT BLD-MCNC: 0.71 MG/DL (ref 0.5–1.4)
ERYTHROCYTE [DISTWIDTH] IN BLOOD BY AUTOMATED COUNT: 12.8 % (ref 12.3–15.4)
GFR SERPL CREATININE-BSD FRML MDRD: 102 ML/MIN/1.73
GLOBULIN UR ELPH-MCNC: 3.4 GM/DL
GLUCOSE BLD-MCNC: 82 MG/DL (ref 70–100)
GLUCOSE UR STRIP-MCNC: NEGATIVE MG/DL
HCT VFR BLD AUTO: 45.6 % (ref 34–46.6)
HGB BLD-MCNC: 15.2 G/DL (ref 12–15.9)
HGB UR QL STRIP.AUTO: NEGATIVE
KETONES UR QL STRIP: NEGATIVE
LEUKOCYTE ESTERASE UR QL STRIP.AUTO: NEGATIVE
LYMPHOCYTES # BLD AUTO: 1.9 10*3/MM3 (ref 0.7–3.1)
LYMPHOCYTES NFR BLD AUTO: 30.3 % (ref 19.6–45.3)
MCH RBC QN AUTO: 29 PG (ref 26.6–33)
MCHC RBC AUTO-ENTMCNC: 33.3 G/DL (ref 31.5–35.7)
MCV RBC AUTO: 86.9 FL (ref 79–97)
NEUTROPHILS # BLD AUTO: 3.9 10*3/MM3 (ref 1.7–7)
NEUTROPHILS NFR BLD AUTO: 61.9 % (ref 42.7–76)
NITRITE UR QL STRIP: NEGATIVE
PH UR STRIP.AUTO: 6.5 [PH] (ref 5–8)
PLATELET # BLD AUTO: 262 10*3/MM3 (ref 140–450)
PMV BLD AUTO: 8.5 FL (ref 6–12)
POTASSIUM BLD-SCNC: 4.1 MMOL/L (ref 3.5–5.3)
PROT SERPL-MCNC: 8.1 G/DL (ref 6.3–8.7)
PROT UR QL STRIP: NEGATIVE
RBC # BLD AUTO: 5.25 10*6/MM3 (ref 3.77–5.28)
SODIUM BLD-SCNC: 141 MMOL/L (ref 135–145)
SP GR UR STRIP: 1.02 (ref 1–1.03)
UROBILINOGEN UR QL STRIP: NORMAL
WBC NRBC COR # BLD: 6.3 10*3/MM3 (ref 3.4–10.8)

## 2020-04-16 PROCEDURE — 86664 EPSTEIN-BARR NUCLEAR ANTIGEN: CPT | Performed by: NURSE PRACTITIONER

## 2020-04-16 PROCEDURE — 81003 URINALYSIS AUTO W/O SCOPE: CPT

## 2020-04-16 PROCEDURE — 81025 URINE PREGNANCY TEST: CPT

## 2020-04-16 PROCEDURE — 36415 COLL VENOUS BLD VENIPUNCTURE: CPT

## 2020-04-16 PROCEDURE — 82607 VITAMIN B-12: CPT | Performed by: NURSE PRACTITIONER

## 2020-04-16 PROCEDURE — 82306 VITAMIN D 25 HYDROXY: CPT | Performed by: NURSE PRACTITIONER

## 2020-04-16 PROCEDURE — 80050 GENERAL HEALTH PANEL: CPT

## 2020-04-16 PROCEDURE — 86665 EPSTEIN-BARR CAPSID VCA: CPT | Performed by: NURSE PRACTITIONER

## 2020-04-17 LAB
25(OH)D3 SERPL-MCNC: 13.7 NG/ML (ref 30–100)
EBV NA IGG SER IA-ACNC: >600 U/ML (ref 0–17.9)
EBV VCA IGG SER-ACNC: 297 U/ML (ref 0–17.9)
EBV VCA IGM SER-ACNC: <36 U/ML (ref 0–35.9)
INTERPRETATION: ABNORMAL
TSH SERPL DL<=0.05 MIU/L-ACNC: 0.81 UIU/ML (ref 0.27–4.2)
VIT B12 BLD-MCNC: 452 PG/ML (ref 211–946)

## 2020-10-07 ENCOUNTER — INITIAL PRENATAL (OUTPATIENT)
Dept: OBSTETRICS AND GYNECOLOGY | Facility: CLINIC | Age: 25
End: 2020-10-07

## 2020-10-07 VITALS — DIASTOLIC BLOOD PRESSURE: 60 MMHG | BODY MASS INDEX: 21.28 KG/M2 | WEIGHT: 124 LBS | SYSTOLIC BLOOD PRESSURE: 102 MMHG

## 2020-10-07 DIAGNOSIS — O21.9 NAUSEA AND VOMITING DURING PREGNANCY: Primary | ICD-10-CM

## 2020-10-07 DIAGNOSIS — O99.330 TOBACCO USE DURING PREGNANCY, ANTEPARTUM: ICD-10-CM

## 2020-10-07 DIAGNOSIS — Z34.81 PRENATAL CARE, SUBSEQUENT PREGNANCY, FIRST TRIMESTER: ICD-10-CM

## 2020-10-07 PROCEDURE — 99214 OFFICE O/P EST MOD 30 MIN: CPT | Performed by: OBSTETRICS & GYNECOLOGY

## 2020-10-07 RX ORDER — ONDANSETRON 4 MG/1
4 TABLET, ORALLY DISINTEGRATING ORAL EVERY 8 HOURS PRN
Qty: 60 TABLET | Refills: 0 | Status: SHIPPED | OUTPATIENT
Start: 2020-10-07 | End: 2021-05-09 | Stop reason: HOSPADM

## 2020-10-07 RX ORDER — ONDANSETRON 4 MG/1
4 TABLET, ORALLY DISINTEGRATING ORAL EVERY 8 HOURS PRN
Qty: 60 TABLET | Refills: 0 | Status: SHIPPED | OUTPATIENT
Start: 2020-10-07 | End: 2020-10-07 | Stop reason: SDUPTHER

## 2020-10-07 RX ORDER — VITAMIN A ACETATE, BETA CAROTENE, ASCORBIC ACID, CHOLECALCIFEROL, .ALPHA.-TOCOPHEROL ACETATE, DL-, THIAMINE MONONITRATE, RIBOFLAVIN, NIACINAMIDE, PYRIDOXINE HYDROCHLORIDE, FOLIC ACID, CYANOCOBALAMIN, CALCIUM CARBONATE, FERROUS FUMARATE, ZINC OXIDE, CUPRIC OXIDE 3080; 12; 120; 400; 1; 1.84; 3; 20; 22; 920; 25; 200; 27; 10; 2 [IU]/1; UG/1; MG/1; [IU]/1; MG/1; MG/1; MG/1; MG/1; MG/1; [IU]/1; MG/1; MG/1; MG/1; MG/1; MG/1
1 TABLET, FILM COATED ORAL DAILY
COMMUNITY
End: 2021-10-13

## 2020-10-07 NOTE — PROGRESS NOTES
Intentional pregnancy.  Healthy.  Patient had been on Prozac but quit taking before she became pregnant.  Patient reports mood is ok right now.  Patient c/o daily N/V. Patient has done well with zofran in the past, sending to pharmacy.  No cramping or VB.  New OB information reviewed today.  Current smoker, 1/2 ppd.  Was able to stop during last pregnancy (2018) - will start trying to cut back.  Discussed M[facility], they will consider.

## 2020-10-14 ENCOUNTER — TELEPHONE (OUTPATIENT)
Dept: OBSTETRICS AND GYNECOLOGY | Facility: CLINIC | Age: 25
End: 2020-10-14

## 2020-10-14 DIAGNOSIS — A74.9 CHLAMYDIA INFECTION DURING PREGNANCY: Primary | ICD-10-CM

## 2020-10-14 DIAGNOSIS — O98.819 CHLAMYDIA INFECTION DURING PREGNANCY: Primary | ICD-10-CM

## 2020-10-14 LAB
ABO GROUP BLD: NORMAL
BACTERIA UR CULT: NORMAL
BACTERIA UR CULT: NORMAL
BASOPHILS # BLD AUTO: 0 X10E3/UL (ref 0–0.2)
BASOPHILS NFR BLD AUTO: 1 %
BLD GP AB SCN SERPL QL: NEGATIVE
C TRACH RRNA SPEC QL NAA+PROBE: POSITIVE
C TRACH RRNA SPEC QL NAA+PROBE: POSITIVE
DRUGS UR: NORMAL
EOSINOPHIL # BLD AUTO: 0.1 X10E3/UL (ref 0–0.4)
EOSINOPHIL NFR BLD AUTO: 1 %
ERYTHROCYTE [DISTWIDTH] IN BLOOD BY AUTOMATED COUNT: 12.4 % (ref 11.7–15.4)
HBV SURFACE AG SERPL QL IA: NEGATIVE
HCT VFR BLD AUTO: 38.2 % (ref 34–46.6)
HGB BLD-MCNC: 13.2 G/DL (ref 11.1–15.9)
HIV 1+2 AB+HIV1 P24 AG SERPL QL IA: NON REACTIVE
IMM GRANULOCYTES # BLD AUTO: 0 X10E3/UL (ref 0–0.1)
IMM GRANULOCYTES NFR BLD AUTO: 1 %
LYMPHOCYTES # BLD AUTO: 1.5 X10E3/UL (ref 0.7–3.1)
LYMPHOCYTES NFR BLD AUTO: 23 %
MCH RBC QN AUTO: 30.8 PG (ref 26.6–33)
MCHC RBC AUTO-ENTMCNC: 34.6 G/DL (ref 31.5–35.7)
MCV RBC AUTO: 89 FL (ref 79–97)
MONOCYTES # BLD AUTO: 0.3 X10E3/UL (ref 0.1–0.9)
MONOCYTES NFR BLD AUTO: 5 %
N GONORRHOEA RRNA SPEC QL NAA+PROBE: NEGATIVE
NEUTROPHILS # BLD AUTO: 4.3 X10E3/UL (ref 1.4–7)
NEUTROPHILS NFR BLD AUTO: 69 %
PLATELET # BLD AUTO: 232 X10E3/UL (ref 150–450)
RBC # BLD AUTO: 4.29 X10E6/UL (ref 3.77–5.28)
RH BLD: NEGATIVE
RPR SER QL: NON REACTIVE
RUBV IGG SERPL IA-ACNC: 1.53 INDEX
WBC # BLD AUTO: 6.2 X10E3/UL (ref 3.4–10.8)

## 2020-10-14 RX ORDER — AZITHROMYCIN 500 MG/1
1000 TABLET, FILM COATED ORAL ONCE
Qty: 2 TABLET | Refills: 0 | Status: SHIPPED | OUTPATIENT
Start: 2020-10-14 | End: 2020-10-15 | Stop reason: SDUPTHER

## 2020-10-14 NOTE — PROGRESS NOTES
Please let patient know that she had + Chlamydia on her new OB labs.  I have already sent treatment to her pharmacy.  Her partner should also be treated and they should abstain from intercourse for two weeks after they get treated.  We will do a test-of-cure when she comes in for her next visit.

## 2020-10-15 DIAGNOSIS — A74.9 CHLAMYDIA INFECTION DURING PREGNANCY: ICD-10-CM

## 2020-10-15 DIAGNOSIS — O98.819 CHLAMYDIA INFECTION DURING PREGNANCY: ICD-10-CM

## 2020-10-19 RX ORDER — AZITHROMYCIN 500 MG/1
TABLET, FILM COATED ORAL
Qty: 2 TABLET | Refills: 0 | Status: SHIPPED | OUTPATIENT
Start: 2020-10-19 | End: 2020-11-12

## 2020-11-12 ENCOUNTER — ROUTINE PRENATAL (OUTPATIENT)
Dept: OBSTETRICS AND GYNECOLOGY | Facility: CLINIC | Age: 25
End: 2020-11-12

## 2020-11-12 VITALS — BODY MASS INDEX: 21.28 KG/M2 | DIASTOLIC BLOOD PRESSURE: 62 MMHG | WEIGHT: 124 LBS | SYSTOLIC BLOOD PRESSURE: 104 MMHG

## 2020-11-12 DIAGNOSIS — A74.9 CHLAMYDIA INFECTION DURING PREGNANCY: Primary | ICD-10-CM

## 2020-11-12 DIAGNOSIS — Z34.81 PRENATAL CARE, SUBSEQUENT PREGNANCY, FIRST TRIMESTER: ICD-10-CM

## 2020-11-12 DIAGNOSIS — O98.819 CHLAMYDIA INFECTION DURING PREGNANCY: Primary | ICD-10-CM

## 2020-11-12 LAB
GLUCOSE UR STRIP-MCNC: NEGATIVE MG/DL
PROT UR STRIP-MCNC: NEGATIVE MG/DL

## 2020-11-12 PROCEDURE — 99213 OFFICE O/P EST LOW 20 MIN: CPT | Performed by: OBSTETRICS & GYNECOLOGY

## 2020-11-12 NOTE — PROGRESS NOTES
Patient completed antibiotics for Chlamydia.  Partner was also treated.  YANETH today.  No cramping or bleeding. Feeling well.  [id number] testing today

## 2020-11-15 LAB
C TRACH RRNA SPEC QL NAA+PROBE: NORMAL
N GONORRHOEA RRNA SPEC QL NAA+PROBE: NORMAL
REQUEST PROBLEM: NORMAL

## 2020-11-23 LAB
5P15 DELETION (CRI-DU-CHAT): ABNORMAL
CFDNA.FET/CFDNA.TOTAL SFR FETUS: ABNORMAL %
CITATION REF LAB TEST: ABNORMAL
FET 13+18+21+X+Y ANEUP PLAS.CFDNA: ABNORMAL
FET 1P36 DEL RISK WBC.DNA+CFDNA QL: ABNORMAL
FET 22Q11.2 DEL RISK WBC.DNA+CFDNA QL: ABNORMAL
FET CHR 11Q23 DEL PLAS.CFDNA QL: ABNORMAL
FET CHR 15Q11 DEL PLAS.CFDNA QL: ABNORMAL
FET CHR 21 TS PLAS.CFDNA QL: ABNORMAL
FET CHR 4P16 DEL PLAS.CFDNA QL: ABNORMAL
FET CHR 8Q24 DEL PLAS.CFDNA QL: ABNORMAL
FET MS X RISK WBC.DNA+CFDNA QL: ABNORMAL
FET SEX PLAS.CFDNA DOSAGE CFDNA: ABNORMAL
FET TS 13 RISK PLAS.CFDNA QL: ABNORMAL
FET TS 18 RISK WBC.DNA+CFDNA QL: ABNORMAL
FET X + Y ANEUP RISK PLAS.CFDNA SEQ-IMP: ABNORMAL
GA EST FROM CONCEPTION DATE: ABNORMAL D
GESTATIONAL AGE > 9:: ABNORMAL
LAB DIRECTOR NAME PROVIDER: ABNORMAL
LAB DIRECTOR NAME PROVIDER: ABNORMAL
LABORATORY COMMENT REPORT: ABNORMAL
LIMITATIONS OF THE TEST: ABNORMAL
NEGATIVE PREDICTIVE VALUE: ABNORMAL
NOTE: ABNORMAL
PERFORMANCE CHARACTERISTICS: ABNORMAL
POSITIVE PREDICTIVE VALUE: ABNORMAL
REF LAB TEST METHOD: ABNORMAL
TEST PERFORMANCE INFO SPEC: ABNORMAL
TRIOSOMY 16: ABNORMAL
TRISOMY 22: ABNORMAL

## 2020-11-24 NOTE — PROGRESS NOTES
I don't know why this test was cancelled.  The patient voiced that she wanted the test while she was in the office.  Can you see if she changed her mind?  If not, please check with lab and see if patient needs to have blood drawn again.  Thx

## 2020-12-15 ENCOUNTER — ROUTINE PRENATAL (OUTPATIENT)
Dept: OBSTETRICS AND GYNECOLOGY | Facility: CLINIC | Age: 25
End: 2020-12-15

## 2020-12-15 VITALS — BODY MASS INDEX: 22.14 KG/M2 | SYSTOLIC BLOOD PRESSURE: 104 MMHG | WEIGHT: 129 LBS | DIASTOLIC BLOOD PRESSURE: 60 MMHG

## 2020-12-15 DIAGNOSIS — Z34.82 PRENATAL CARE, SUBSEQUENT PREGNANCY, SECOND TRIMESTER: Primary | ICD-10-CM

## 2020-12-15 LAB
GLUCOSE UR STRIP-MCNC: NEGATIVE MG/DL
PROT UR STRIP-MCNC: NEGATIVE MG/DL

## 2020-12-15 PROCEDURE — 99213 OFFICE O/P EST LOW 20 MIN: CPT | Performed by: OBSTETRICS & GYNECOLOGY

## 2020-12-15 NOTE — PROGRESS NOTES
Patient reports pain in hips and legs, mostly at night.  Patient reports that she is good to drink plenty of water.  Encouraged her try magnesium or sports drinks.  No cramping or VB.  Good FM.  YANETH for Chlamydia today.  N/V much better now.

## 2020-12-17 LAB
C TRACH RRNA SPEC QL NAA+PROBE: NEGATIVE
N GONORRHOEA RRNA SPEC QL NAA+PROBE: NEGATIVE

## 2021-01-19 ENCOUNTER — ROUTINE PRENATAL (OUTPATIENT)
Dept: OBSTETRICS AND GYNECOLOGY | Facility: CLINIC | Age: 26
End: 2021-01-19

## 2021-01-19 VITALS — BODY MASS INDEX: 22.83 KG/M2 | DIASTOLIC BLOOD PRESSURE: 60 MMHG | WEIGHT: 133 LBS | SYSTOLIC BLOOD PRESSURE: 98 MMHG

## 2021-01-19 DIAGNOSIS — Z34.82 PRENATAL CARE, SUBSEQUENT PREGNANCY, SECOND TRIMESTER: Primary | ICD-10-CM

## 2021-01-19 LAB
GLUCOSE UR STRIP-MCNC: NEGATIVE MG/DL
PROT UR STRIP-MCNC: NEGATIVE MG/DL

## 2021-01-19 PROCEDURE — 90471 IMMUNIZATION ADMIN: CPT | Performed by: OBSTETRICS & GYNECOLOGY

## 2021-01-19 PROCEDURE — 90686 IIV4 VACC NO PRSV 0.5 ML IM: CPT | Performed by: OBSTETRICS & GYNECOLOGY

## 2021-01-19 PROCEDURE — 99213 OFFICE O/P EST LOW 20 MIN: CPT | Performed by: OBSTETRICS & GYNECOLOGY

## 2021-01-19 NOTE — PROGRESS NOTES
No pain, no LOF or VB  Baby is a GIRL!  YANETH for chlamydia was negative.  GCT and TDaP at next visit.  Instructions given  Flu shot today  Questions about Covid vaccine answered.

## 2021-02-19 ENCOUNTER — ROUTINE PRENATAL (OUTPATIENT)
Dept: OBSTETRICS AND GYNECOLOGY | Facility: CLINIC | Age: 26
End: 2021-02-19

## 2021-02-19 VITALS — DIASTOLIC BLOOD PRESSURE: 54 MMHG | SYSTOLIC BLOOD PRESSURE: 104 MMHG | WEIGHT: 140 LBS | BODY MASS INDEX: 24.03 KG/M2

## 2021-02-19 DIAGNOSIS — Z34.82 PRENATAL CARE, SUBSEQUENT PREGNANCY, SECOND TRIMESTER: Primary | ICD-10-CM

## 2021-02-19 LAB
GLUCOSE UR STRIP-MCNC: NEGATIVE MG/DL
PROT UR STRIP-MCNC: NEGATIVE MG/DL

## 2021-02-19 PROCEDURE — 90715 TDAP VACCINE 7 YRS/> IM: CPT | Performed by: OBSTETRICS & GYNECOLOGY

## 2021-02-19 PROCEDURE — 96372 THER/PROPH/DIAG INJ SC/IM: CPT | Performed by: OBSTETRICS & GYNECOLOGY

## 2021-02-19 PROCEDURE — 90471 IMMUNIZATION ADMIN: CPT | Performed by: OBSTETRICS & GYNECOLOGY

## 2021-02-19 PROCEDURE — 99213 OFFICE O/P EST LOW 20 MIN: CPT | Performed by: OBSTETRICS & GYNECOLOGY

## 2021-02-19 NOTE — PROGRESS NOTES
Feeling good, no pain.  No LOF or VB.  Good FM  Occasional mild swelling  GCT, and Rhogam and TDaP today.    4D u/s next visit  No questions or concerns.

## 2021-02-20 LAB
BLD GP AB SCN SERPL QL: NEGATIVE
GLUCOSE 1H P 50 G GLC PO SERPL-MCNC: 80 MG/DL (ref 65–139)
HGB BLD-MCNC: 11.2 G/DL (ref 12–15.9)

## 2021-03-07 ENCOUNTER — HOSPITAL ENCOUNTER (OUTPATIENT)
Facility: HOSPITAL | Age: 26
Discharge: HOME | End: 2021-03-07
Attending: OBSTETRICS & GYNECOLOGY | Admitting: OBSTETRICS & GYNECOLOGY

## 2021-03-07 VITALS
HEIGHT: 64 IN | SYSTOLIC BLOOD PRESSURE: 119 MMHG | BODY MASS INDEX: 24.82 KG/M2 | HEART RATE: 114 BPM | RESPIRATION RATE: 18 BRPM | WEIGHT: 145.4 LBS | DIASTOLIC BLOOD PRESSURE: 61 MMHG | TEMPERATURE: 98.3 F

## 2021-03-07 PROCEDURE — G0463 HOSPITAL OUTPT CLINIC VISIT: HCPCS

## 2021-03-07 PROCEDURE — G0378 HOSPITAL OBSERVATION PER HR: HCPCS

## 2021-03-07 NOTE — NURSING NOTE
MD Foster called. Pt c/o poss SROM at 11am, sudden small gush of fluid, and pelvic pressure. Nitrazine and fern negative. Pt reports having sex last night. Cervical exam 0/0/-. Pt states pressure has significantly decreased since arriving on unit.

## 2021-03-08 PROBLEM — Z34.90 PREGNANCY: Status: ACTIVE | Noted: 2021-03-08

## 2021-03-09 ENCOUNTER — ROUTINE PRENATAL (OUTPATIENT)
Dept: OBSTETRICS AND GYNECOLOGY | Facility: CLINIC | Age: 26
End: 2021-03-09

## 2021-03-09 VITALS — DIASTOLIC BLOOD PRESSURE: 60 MMHG | SYSTOLIC BLOOD PRESSURE: 108 MMHG | WEIGHT: 142 LBS | BODY MASS INDEX: 24.37 KG/M2

## 2021-03-09 DIAGNOSIS — O99.330 TOBACCO USE DURING PREGNANCY, ANTEPARTUM: ICD-10-CM

## 2021-03-09 DIAGNOSIS — Z34.82 PRENATAL CARE, SUBSEQUENT PREGNANCY, SECOND TRIMESTER: Primary | ICD-10-CM

## 2021-03-09 LAB
GLUCOSE UR STRIP-MCNC: NEGATIVE MG/DL
PROT UR STRIP-MCNC: NEGATIVE MG/DL

## 2021-03-09 PROCEDURE — 99213 OFFICE O/P EST LOW 20 MIN: CPT | Performed by: OBSTETRICS & GYNECOLOGY

## 2021-03-09 NOTE — PROGRESS NOTES
"Feeling well.  No pain since then, but was in L&D for \"pressure\" on Sunday and everything checked out fine.  No LOF or VB.  Good FM.  4D u/s today  Doing well, no questions or concerns.   Still smoking 1/2 ppd.  Cessation encouraged.   Diagnosis Plan   1. Prenatal care, subsequent pregnancy, second trimester     2. Tobacco use during pregnancy, antepartum         "

## 2021-03-26 ENCOUNTER — ROUTINE PRENATAL (OUTPATIENT)
Dept: OBSTETRICS AND GYNECOLOGY | Facility: CLINIC | Age: 26
End: 2021-03-26

## 2021-03-26 VITALS — DIASTOLIC BLOOD PRESSURE: 58 MMHG | WEIGHT: 138 LBS | BODY MASS INDEX: 23.69 KG/M2 | SYSTOLIC BLOOD PRESSURE: 102 MMHG

## 2021-03-26 DIAGNOSIS — O99.330 TOBACCO USE DURING PREGNANCY, ANTEPARTUM: ICD-10-CM

## 2021-03-26 DIAGNOSIS — Z34.82 PRENATAL CARE, SUBSEQUENT PREGNANCY, SECOND TRIMESTER: Primary | ICD-10-CM

## 2021-03-26 LAB
GLUCOSE UR STRIP-MCNC: NEGATIVE MG/DL
PROT UR STRIP-MCNC: ABNORMAL MG/DL

## 2021-03-26 PROCEDURE — 99213 OFFICE O/P EST LOW 20 MIN: CPT | Performed by: OBSTETRICS & GYNECOLOGY

## 2021-03-26 NOTE — PROGRESS NOTES
Patient reports increased vaginal pressure for past two days.  ? About contractions  Good FM still.  No LOF or VB.  Cx 1/50, but still -3  .   Diagnosis Plan   1. Prenatal care, subsequent pregnancy, second trimester     2. Tobacco use during pregnancy, antepartum

## 2021-03-28 ENCOUNTER — HOSPITAL ENCOUNTER (OUTPATIENT)
Facility: HOSPITAL | Age: 26
Discharge: HOME | End: 2021-03-28
Attending: OBSTETRICS & GYNECOLOGY | Admitting: OBSTETRICS & GYNECOLOGY

## 2021-03-28 VITALS
DIASTOLIC BLOOD PRESSURE: 55 MMHG | HEART RATE: 96 BPM | TEMPERATURE: 97.8 F | SYSTOLIC BLOOD PRESSURE: 113 MMHG | RESPIRATION RATE: 16 BRPM | HEIGHT: 64 IN | BODY MASS INDEX: 24.65 KG/M2 | WEIGHT: 144.4 LBS

## 2021-03-28 LAB
BILIRUB UR QL STRIP: NEGATIVE
CLARITY UR: CLEAR
COLOR UR: YELLOW
GLUCOSE UR STRIP-MCNC: NEGATIVE MG/DL
HGB UR QL STRIP.AUTO: NEGATIVE
KETONES UR QL STRIP: ABNORMAL
LEUKOCYTE ESTERASE UR QL STRIP.AUTO: NEGATIVE
NITRITE UR QL STRIP: NEGATIVE
PH UR STRIP.AUTO: 6 [PH] (ref 5–8)
PROT UR QL STRIP: NEGATIVE
SP GR UR STRIP: 1.03 (ref 1–1.03)
UROBILINOGEN UR QL STRIP: ABNORMAL

## 2021-03-28 PROCEDURE — 81003 URINALYSIS AUTO W/O SCOPE: CPT | Performed by: OBSTETRICS & GYNECOLOGY

## 2021-03-28 PROCEDURE — G0463 HOSPITAL OUTPT CLINIC VISIT: HCPCS

## 2021-03-28 PROCEDURE — G0378 HOSPITAL OBSERVATION PER HR: HCPCS

## 2021-03-28 PROCEDURE — 87086 URINE CULTURE/COLONY COUNT: CPT | Performed by: OBSTETRICS & GYNECOLOGY

## 2021-03-29 LAB — BACTERIA SPEC AEROBE CULT: NO GROWTH

## 2021-04-15 ENCOUNTER — ROUTINE PRENATAL (OUTPATIENT)
Dept: OBSTETRICS AND GYNECOLOGY | Facility: CLINIC | Age: 26
End: 2021-04-15

## 2021-04-15 VITALS — DIASTOLIC BLOOD PRESSURE: 60 MMHG | SYSTOLIC BLOOD PRESSURE: 94 MMHG | WEIGHT: 145 LBS | BODY MASS INDEX: 24.89 KG/M2

## 2021-04-15 DIAGNOSIS — Z34.83 PRENATAL CARE, SUBSEQUENT PREGNANCY, THIRD TRIMESTER: Primary | ICD-10-CM

## 2021-04-15 LAB
GLUCOSE UR STRIP-MCNC: NEGATIVE MG/DL
PROT UR STRIP-MCNC: ABNORMAL MG/DL

## 2021-04-15 PROCEDURE — 99213 OFFICE O/P EST LOW 20 MIN: CPT | Performed by: OBSTETRICS & GYNECOLOGY

## 2021-04-15 NOTE — PROGRESS NOTES
+ contractions, but not regular.  No LOF or VB  Good FM  Cx unchanged at 1-2/50/-3.  Cultures collected  No questions

## 2021-04-18 ENCOUNTER — HOSPITAL ENCOUNTER (OUTPATIENT)
Facility: HOSPITAL | Age: 26
Discharge: HOME | End: 2021-04-18
Attending: OBSTETRICS & GYNECOLOGY

## 2021-04-18 VITALS
SYSTOLIC BLOOD PRESSURE: 98 MMHG | RESPIRATION RATE: 16 BRPM | DIASTOLIC BLOOD PRESSURE: 48 MMHG | TEMPERATURE: 97.5 F | HEART RATE: 97 BPM

## 2021-04-18 PROCEDURE — G0378 HOSPITAL OBSERVATION PER HR: HCPCS

## 2021-04-18 PROCEDURE — 59025 FETAL NON-STRESS TEST: CPT

## 2021-04-18 PROCEDURE — G0463 HOSPITAL OUTPT CLINIC VISIT: HCPCS

## 2021-04-18 NOTE — NON STRESS TEST
Reason for test: OB Triage  Date of Test: 4/18/2021  Time frame of test: 8186-5280  RN NST Interpretation:  reactive       kristen rose/kyle moreno RN

## 2021-04-22 ENCOUNTER — ROUTINE PRENATAL (OUTPATIENT)
Dept: OBSTETRICS AND GYNECOLOGY | Facility: CLINIC | Age: 26
End: 2021-04-22

## 2021-04-22 VITALS — WEIGHT: 147 LBS | DIASTOLIC BLOOD PRESSURE: 64 MMHG | BODY MASS INDEX: 25.23 KG/M2 | SYSTOLIC BLOOD PRESSURE: 102 MMHG

## 2021-04-22 DIAGNOSIS — Z34.83 PRENATAL CARE, SUBSEQUENT PREGNANCY, THIRD TRIMESTER: Primary | ICD-10-CM

## 2021-04-22 LAB
GLUCOSE UR STRIP-MCNC: NEGATIVE MG/DL
PROT UR STRIP-MCNC: ABNORMAL MG/DL

## 2021-04-22 PROCEDURE — 99212 OFFICE O/P EST SF 10 MIN: CPT | Performed by: OBSTETRICS & GYNECOLOGY

## 2021-04-22 NOTE — PROGRESS NOTES
Had contractions on Sunday, but was sent home from L&D.  No VB and no LOF.  Not nancy now.  Good FM  Cx unchanged, 1-2/50/-3  GBS negative

## 2021-04-29 ENCOUNTER — ROUTINE PRENATAL (OUTPATIENT)
Dept: OBSTETRICS AND GYNECOLOGY | Facility: CLINIC | Age: 26
End: 2021-04-29

## 2021-04-29 VITALS — WEIGHT: 150 LBS | DIASTOLIC BLOOD PRESSURE: 60 MMHG | BODY MASS INDEX: 25.75 KG/M2 | SYSTOLIC BLOOD PRESSURE: 104 MMHG

## 2021-04-29 DIAGNOSIS — O99.330 TOBACCO USE DURING PREGNANCY, ANTEPARTUM: ICD-10-CM

## 2021-04-29 DIAGNOSIS — Z34.83 PRENATAL CARE, SUBSEQUENT PREGNANCY, THIRD TRIMESTER: Primary | ICD-10-CM

## 2021-04-29 LAB
GLUCOSE UR STRIP-MCNC: NEGATIVE MG/DL
PROT UR STRIP-MCNC: NEGATIVE MG/DL

## 2021-04-29 PROCEDURE — 99212 OFFICE O/P EST SF 10 MIN: CPT | Performed by: OBSTETRICS & GYNECOLOGY

## 2021-04-29 NOTE — PROGRESS NOTES
Patient reports increased contractions today.  No LOF or VB  Good FM  Cx 1/80/-2, vertex  GBS negative

## 2021-05-06 ENCOUNTER — ROUTINE PRENATAL (OUTPATIENT)
Dept: OBSTETRICS AND GYNECOLOGY | Facility: CLINIC | Age: 26
End: 2021-05-06

## 2021-05-06 VITALS — WEIGHT: 148 LBS | DIASTOLIC BLOOD PRESSURE: 68 MMHG | BODY MASS INDEX: 25.4 KG/M2 | SYSTOLIC BLOOD PRESSURE: 122 MMHG

## 2021-05-06 DIAGNOSIS — O99.330 TOBACCO USE DURING PREGNANCY, ANTEPARTUM: ICD-10-CM

## 2021-05-06 DIAGNOSIS — Z34.83 PRENATAL CARE, SUBSEQUENT PREGNANCY, THIRD TRIMESTER: Primary | ICD-10-CM

## 2021-05-06 LAB
GLUCOSE UR STRIP-MCNC: NEGATIVE MG/DL
PROT UR STRIP-MCNC: NEGATIVE MG/DL

## 2021-05-06 PROCEDURE — 99213 OFFICE O/P EST LOW 20 MIN: CPT | Performed by: OBSTETRICS & GYNECOLOGY

## 2021-05-06 NOTE — H&P
[facility]  Urbano Pineda  : 1995  MRN: [id number]  CSN: [id number]    History and Physical    Subjective   Urbano Pineda is a 25 y.o. year old  with an Estimated Date of Delivery: 21 scheduled for induction of labor tomorrow due to elective - patient's request.  Prenatal care has been with Dr. Yesica Levine.  It has been benign.    OB History    Para Term  AB Living   4 1 1 0 2 1   SAB TAB Ectopic Molar Multiple Live Births   1 0 1 0 0 1      # Outcome Date GA Lbr Ricky/2nd Weight Sex Delivery Anes PTL Lv   4 Current            3 Term 18 40w0d  3742 g (8 lb 4 oz) M Vaginal unsp EPI  RINA   2 SAB 17 6w0d          1 Ectopic 2015 6w0d            Past Medical History:   Diagnosis Date   • Spon  w/o complication      Past Surgical History:   Procedure Laterality Date   • WISDOM TOOTH EXTRACTION         Current Outpatient Medications:   •  ondansetron ODT (Zofran ODT) 4 MG disintegrating tablet, Place 1 tablet on the tongue Every 8 (Eight) Hours As Needed for Nausea or Vomiting., Disp: 60 tablet, Rfl: 0  •  prenatal vitamins (PRENATAL 27-1) 27-1 MG tablet tablet, Take 1 tablet by mouth Daily., Disp: , Rfl:     Allergies   Allergen Reactions   • Cephalexin Other (See Comments)     Passed out     Social History    Tobacco Use      Smoking status: Current Every Day Smoker        Packs/day: 0.50        Types: Cigarettes      Smokeless tobacco: Never Used    Review of Systems      Objective   /68   Wt 67.1 kg (148 lb)   BMI 25.40 kg/m²   General: well developed; well nourished  no acute distress   Heart: Not performed.   Lungs: breathing is unlabored   Abdomen:  Cervix:    Presentation:  EFW by Leopold's:  EFW by recent u/s: soft, non-tender; no masses  fundus firm and non-tender  was checked (by me): 2 cm / 80 % / -2  Vertex  7.5 pounds       Prenatal Labs  Lab Results   Component Value Date    HGB 11.2 (L) 2021    HEPBSAG Negative 10/07/2020    ABORH  CANCELED 2015    ABORH CANCELED 2015    ABORH AB Rh Negative 2015    ABO AB 10/07/2020    RH Negative 10/07/2020    ABSCRN Negative 2021    [id number] Non Reactive 10/07/2020    HEPCVIRUSABY Negative 2019    URINECX No growth 2021       Recent Labs  Lab Results   Component Value Date    HGB 11.2 (L) 2021    HCT 38.2 10/07/2020    WBC 6.2 10/07/2020     10/07/2020           Assessment   1. IUP with an Estimated Date of Delivery: 21  2. Induction of labor scheduled tomorrow for elective - patient's request.  The patient's pelvis feels clinically adequate for IOL to be appropriate, although she understands that this clinical judgement is not always accurate.  3. Group B Strep status: negative         Plan   1. Risks and benefits of induction discussed.  Patient understands that IOL increases the risk of  delivery over spontaneous labor, especially if the patient does not have a favorable cervix.  2. Patient to arrive at 6 AM.  Will start pitocin per protocol and then AROM when appropriate    Yesica Levine MD  2021

## 2021-05-07 ENCOUNTER — ANESTHESIA EVENT (OUTPATIENT)
Dept: LABOR AND DELIVERY | Facility: HOSPITAL | Age: 26
End: 2021-05-07

## 2021-05-07 ENCOUNTER — HOSPITAL ENCOUNTER (INPATIENT)
Facility: HOSPITAL | Age: 26
LOS: 2 days | Discharge: HOME | End: 2021-05-09
Attending: OBSTETRICS & GYNECOLOGY | Admitting: OBSTETRICS & GYNECOLOGY

## 2021-05-07 ENCOUNTER — ANESTHESIA (OUTPATIENT)
Dept: LABOR AND DELIVERY | Facility: HOSPITAL | Age: 26
End: 2021-05-07

## 2021-05-07 ENCOUNTER — HOSPITAL ENCOUNTER (OUTPATIENT)
Dept: LABOR AND DELIVERY | Facility: HOSPITAL | Age: 26
Discharge: HOME | End: 2021-05-07
Admitting: OBSTETRICS & GYNECOLOGY

## 2021-05-07 DIAGNOSIS — Z37.9 NORMAL LABOR: Primary | ICD-10-CM

## 2021-05-07 PROBLEM — O09.10 PREGNANCY WITH HISTORY OF ECTOPIC PREGNANCY: Status: RESOLVED | Noted: 2017-10-31 | Resolved: 2021-05-07

## 2021-05-07 PROBLEM — Z34.90 PREGNANCY: Status: RESOLVED | Noted: 2021-03-08 | Resolved: 2021-05-07

## 2021-05-07 LAB
ABO GROUP BLD: NORMAL
ANTI-C: NORMAL
BLD GP AB SCN SERPL QL: POSITIVE
C AG RBC QL: NEGATIVE
DEPRECATED RDW RBC AUTO: 38.4 FL (ref 37–54)
ERYTHROCYTE [DISTWIDTH] IN BLOOD BY AUTOMATED COUNT: 12.7 % (ref 12.3–15.4)
HCT VFR BLD AUTO: 33.6 % (ref 34–46.6)
HGB BLD-MCNC: 11.3 G/DL (ref 12–15.9)
MCH RBC QN AUTO: 28.6 PG (ref 26.6–33)
MCHC RBC AUTO-ENTMCNC: 33.6 G/DL (ref 31.5–35.7)
MCV RBC AUTO: 85.1 FL (ref 79–97)
PLATELET # BLD AUTO: 239 10*3/MM3 (ref 140–450)
PMV BLD AUTO: 10.1 FL (ref 6–12)
RBC # BLD AUTO: 3.95 10*6/MM3 (ref 3.77–5.28)
RESIDUAL RHIG DETECTED: NORMAL
RH BLD: NEGATIVE
T&S EXPIRATION DATE: NORMAL
WBC # BLD AUTO: 10.75 10*3/MM3 (ref 3.4–10.8)

## 2021-05-07 PROCEDURE — 51702 INSERT TEMP BLADDER CATH: CPT

## 2021-05-07 PROCEDURE — 36415 COLL VENOUS BLD VENIPUNCTURE: CPT | Performed by: OBSTETRICS & GYNECOLOGY

## 2021-05-07 PROCEDURE — 6A550ZT PHERESIS OF CORD BLOOD STEM CELLS, SINGLE: ICD-10-PCS | Performed by: OBSTETRICS & GYNECOLOGY

## 2021-05-07 PROCEDURE — 86870 RBC ANTIBODY IDENTIFICATION: CPT | Performed by: OBSTETRICS & GYNECOLOGY

## 2021-05-07 PROCEDURE — 3E033VJ INTRODUCTION OF OTHER HORMONE INTO PERIPHERAL VEIN, PERCUTANEOUS APPROACH: ICD-10-PCS | Performed by: OBSTETRICS & GYNECOLOGY

## 2021-05-07 PROCEDURE — 86920 COMPATIBILITY TEST SPIN: CPT

## 2021-05-07 PROCEDURE — 86850 RBC ANTIBODY SCREEN: CPT | Performed by: OBSTETRICS & GYNECOLOGY

## 2021-05-07 PROCEDURE — 0UQMXZZ REPAIR VULVA, EXTERNAL APPROACH: ICD-10-PCS | Performed by: OBSTETRICS & GYNECOLOGY

## 2021-05-07 PROCEDURE — C1755 CATHETER, INTRASPINAL: HCPCS | Performed by: NURSE ANESTHETIST, CERTIFIED REGISTERED

## 2021-05-07 PROCEDURE — 86905 BLOOD TYPING RBC ANTIGENS: CPT | Performed by: OBSTETRICS & GYNECOLOGY

## 2021-05-07 PROCEDURE — 59410 OBSTETRICAL CARE: CPT | Performed by: OBSTETRICS & GYNECOLOGY

## 2021-05-07 PROCEDURE — 86900 BLOOD TYPING SEROLOGIC ABO: CPT | Performed by: OBSTETRICS & GYNECOLOGY

## 2021-05-07 PROCEDURE — 86901 BLOOD TYPING SEROLOGIC RH(D): CPT | Performed by: OBSTETRICS & GYNECOLOGY

## 2021-05-07 PROCEDURE — 86902 BLOOD TYPE ANTIGEN DONOR EA: CPT

## 2021-05-07 PROCEDURE — 25010000002 ROPIVACAINE PER 1 MG: Performed by: NURSE ANESTHETIST, CERTIFIED REGISTERED

## 2021-05-07 PROCEDURE — 86922 COMPATIBILITY TEST ANTIGLOB: CPT

## 2021-05-07 PROCEDURE — 88307 TISSUE EXAM BY PATHOLOGIST: CPT | Performed by: OBSTETRICS & GYNECOLOGY

## 2021-05-07 PROCEDURE — 85027 COMPLETE CBC AUTOMATED: CPT | Performed by: OBSTETRICS & GYNECOLOGY

## 2021-05-07 RX ORDER — FAMOTIDINE 20 MG/1
20 TABLET, FILM COATED ORAL ONCE AS NEEDED
Status: DISCONTINUED | OUTPATIENT
Start: 2021-05-07 | End: 2021-05-07 | Stop reason: HOSPADM

## 2021-05-07 RX ORDER — MORPHINE SULFATE 2 MG/ML
1 INJECTION, SOLUTION INTRAMUSCULAR; INTRAVENOUS EVERY 4 HOURS PRN
Status: DISCONTINUED | OUTPATIENT
Start: 2021-05-07 | End: 2021-05-09 | Stop reason: HOSPADM

## 2021-05-07 RX ORDER — HYDROCORTISONE 25 MG/G
1 CREAM TOPICAL AS NEEDED
Status: DISCONTINUED | OUTPATIENT
Start: 2021-05-07 | End: 2021-05-09 | Stop reason: HOSPADM

## 2021-05-07 RX ORDER — ONDANSETRON 4 MG/1
4 TABLET, FILM COATED ORAL EVERY 6 HOURS PRN
Status: DISCONTINUED | OUTPATIENT
Start: 2021-05-07 | End: 2021-05-07 | Stop reason: HOSPADM

## 2021-05-07 RX ORDER — IBUPROFEN 600 MG/1
600 TABLET ORAL EVERY 8 HOURS PRN
Status: DISCONTINUED | OUTPATIENT
Start: 2021-05-07 | End: 2021-05-09 | Stop reason: HOSPADM

## 2021-05-07 RX ORDER — PROMETHAZINE HYDROCHLORIDE 25 MG/1
25 TABLET ORAL EVERY 6 HOURS PRN
Status: DISCONTINUED | OUTPATIENT
Start: 2021-05-07 | End: 2021-05-09 | Stop reason: HOSPADM

## 2021-05-07 RX ORDER — LIDOCAINE HYDROCHLORIDE AND EPINEPHRINE 15; 5 MG/ML; UG/ML
INJECTION, SOLUTION EPIDURAL
Status: COMPLETED | OUTPATIENT
Start: 2021-05-07 | End: 2021-05-07

## 2021-05-07 RX ORDER — PROMETHAZINE HYDROCHLORIDE 12.5 MG/1
12.5 SUPPOSITORY RECTAL EVERY 6 HOURS PRN
Status: DISCONTINUED | OUTPATIENT
Start: 2021-05-07 | End: 2021-05-07 | Stop reason: HOSPADM

## 2021-05-07 RX ORDER — ACETAMINOPHEN 325 MG/1
650 TABLET ORAL EVERY 4 HOURS PRN
Status: DISCONTINUED | OUTPATIENT
Start: 2021-05-07 | End: 2021-05-07 | Stop reason: HOSPADM

## 2021-05-07 RX ORDER — CARBOPROST TROMETHAMINE 250 UG/ML
250 INJECTION, SOLUTION INTRAMUSCULAR ONCE AS NEEDED
Status: DISCONTINUED | OUTPATIENT
Start: 2021-05-07 | End: 2021-05-09 | Stop reason: HOSPADM

## 2021-05-07 RX ORDER — DOCUSATE SODIUM 100 MG/1
100 CAPSULE, LIQUID FILLED ORAL 2 TIMES DAILY
Status: DISCONTINUED | OUTPATIENT
Start: 2021-05-07 | End: 2021-05-09 | Stop reason: HOSPADM

## 2021-05-07 RX ORDER — SODIUM CHLORIDE 0.9 % (FLUSH) 0.9 %
10 SYRINGE (ML) INJECTION AS NEEDED
Status: DISCONTINUED | OUTPATIENT
Start: 2021-05-07 | End: 2021-05-07 | Stop reason: HOSPADM

## 2021-05-07 RX ORDER — EPHEDRINE SULFATE 50 MG/ML
10 INJECTION, SOLUTION INTRAVENOUS
Status: COMPLETED | OUTPATIENT
Start: 2021-05-07 | End: 2021-05-07

## 2021-05-07 RX ORDER — PROMETHAZINE HYDROCHLORIDE 25 MG/1
12.5 TABLET ORAL EVERY 6 HOURS PRN
Status: DISCONTINUED | OUTPATIENT
Start: 2021-05-07 | End: 2021-05-07 | Stop reason: HOSPADM

## 2021-05-07 RX ORDER — OXYTOCIN/0.9 % SODIUM CHLORIDE 30/500 ML
999 PLASTIC BAG, INJECTION (ML) INTRAVENOUS ONCE
Status: DISCONTINUED | OUTPATIENT
Start: 2021-05-07 | End: 2021-05-07 | Stop reason: HOSPADM

## 2021-05-07 RX ORDER — CALCIUM CARBONATE 200(500)MG
2 TABLET,CHEWABLE ORAL 3 TIMES DAILY PRN
Status: DISCONTINUED | OUTPATIENT
Start: 2021-05-07 | End: 2021-05-09 | Stop reason: HOSPADM

## 2021-05-07 RX ORDER — OXYTOCIN/0.9 % SODIUM CHLORIDE 30/500 ML
125 PLASTIC BAG, INJECTION (ML) INTRAVENOUS CONTINUOUS PRN
Status: COMPLETED | OUTPATIENT
Start: 2021-05-07 | End: 2021-05-07

## 2021-05-07 RX ORDER — MISOPROSTOL 200 UG/1
600 TABLET ORAL ONCE AS NEEDED
Status: DISCONTINUED | OUTPATIENT
Start: 2021-05-07 | End: 2021-05-09 | Stop reason: HOSPADM

## 2021-05-07 RX ORDER — METHYLERGONOVINE MALEATE 0.2 MG/ML
200 INJECTION INTRAVENOUS ONCE AS NEEDED
Status: DISCONTINUED | OUTPATIENT
Start: 2021-05-07 | End: 2021-05-07 | Stop reason: HOSPADM

## 2021-05-07 RX ORDER — HYDROCODONE BITARTRATE AND ACETAMINOPHEN 5; 325 MG/1; MG/1
1 TABLET ORAL EVERY 4 HOURS PRN
Status: DISCONTINUED | OUTPATIENT
Start: 2021-05-07 | End: 2021-05-08

## 2021-05-07 RX ORDER — OXYTOCIN/0.9 % SODIUM CHLORIDE 30/500 ML
250 PLASTIC BAG, INJECTION (ML) INTRAVENOUS CONTINUOUS
Status: ACTIVE | OUTPATIENT
Start: 2021-05-07 | End: 2021-05-07

## 2021-05-07 RX ORDER — FAMOTIDINE 10 MG/ML
20 INJECTION, SOLUTION INTRAVENOUS ONCE AS NEEDED
Status: DISCONTINUED | OUTPATIENT
Start: 2021-05-07 | End: 2021-05-07 | Stop reason: HOSPADM

## 2021-05-07 RX ORDER — ONDANSETRON 2 MG/ML
4 INJECTION INTRAMUSCULAR; INTRAVENOUS EVERY 6 HOURS PRN
Status: DISCONTINUED | OUTPATIENT
Start: 2021-05-07 | End: 2021-05-07 | Stop reason: HOSPADM

## 2021-05-07 RX ORDER — CARBOPROST TROMETHAMINE 250 UG/ML
250 INJECTION, SOLUTION INTRAMUSCULAR AS NEEDED
Status: DISCONTINUED | OUTPATIENT
Start: 2021-05-07 | End: 2021-05-07 | Stop reason: HOSPADM

## 2021-05-07 RX ORDER — NALOXONE HCL 0.4 MG/ML
0.4 VIAL (ML) INJECTION
Status: DISCONTINUED | OUTPATIENT
Start: 2021-05-07 | End: 2021-05-09 | Stop reason: HOSPADM

## 2021-05-07 RX ORDER — SODIUM CHLORIDE 0.9 % (FLUSH) 0.9 %
3 SYRINGE (ML) INJECTION EVERY 12 HOURS SCHEDULED
Status: DISCONTINUED | OUTPATIENT
Start: 2021-05-07 | End: 2021-05-07 | Stop reason: HOSPADM

## 2021-05-07 RX ORDER — SODIUM CHLORIDE, SODIUM LACTATE, POTASSIUM CHLORIDE, CALCIUM CHLORIDE 600; 310; 30; 20 MG/100ML; MG/100ML; MG/100ML; MG/100ML
125 INJECTION, SOLUTION INTRAVENOUS CONTINUOUS
Status: DISCONTINUED | OUTPATIENT
Start: 2021-05-07 | End: 2021-05-07

## 2021-05-07 RX ORDER — OXYTOCIN/0.9 % SODIUM CHLORIDE 30/500 ML
2-20 PLASTIC BAG, INJECTION (ML) INTRAVENOUS
Status: DISCONTINUED | OUTPATIENT
Start: 2021-05-07 | End: 2021-05-07

## 2021-05-07 RX ORDER — PROMETHAZINE HYDROCHLORIDE 12.5 MG/1
12.5 SUPPOSITORY RECTAL EVERY 6 HOURS PRN
Status: DISCONTINUED | OUTPATIENT
Start: 2021-05-07 | End: 2021-05-09 | Stop reason: HOSPADM

## 2021-05-07 RX ORDER — EPHEDRINE SULFATE 50 MG/ML
10 INJECTION, SOLUTION INTRAVENOUS
Status: DISCONTINUED | OUTPATIENT
Start: 2021-05-07 | End: 2021-05-07 | Stop reason: HOSPADM

## 2021-05-07 RX ORDER — ROPIVACAINE HYDROCHLORIDE 2 MG/ML
INJECTION, SOLUTION EPIDURAL; INFILTRATION; PERINEURAL AS NEEDED
Status: DISCONTINUED | OUTPATIENT
Start: 2021-05-07 | End: 2021-05-08 | Stop reason: SURG

## 2021-05-07 RX ORDER — MISOPROSTOL 200 UG/1
800 TABLET ORAL AS NEEDED
Status: DISCONTINUED | OUTPATIENT
Start: 2021-05-07 | End: 2021-05-07 | Stop reason: HOSPADM

## 2021-05-07 RX ORDER — TERBUTALINE SULFATE 1 MG/ML
0.25 INJECTION, SOLUTION SUBCUTANEOUS AS NEEDED
Status: DISCONTINUED | OUTPATIENT
Start: 2021-05-07 | End: 2021-05-07 | Stop reason: HOSPADM

## 2021-05-07 RX ORDER — BISACODYL 10 MG
10 SUPPOSITORY, RECTAL RECTAL DAILY PRN
Status: DISCONTINUED | OUTPATIENT
Start: 2021-05-08 | End: 2021-05-09 | Stop reason: HOSPADM

## 2021-05-07 RX ORDER — MORPHINE SULFATE 10 MG/ML
10 INJECTION INTRAMUSCULAR; INTRAVENOUS; SUBCUTANEOUS
Status: DISCONTINUED | OUTPATIENT
Start: 2021-05-07 | End: 2021-05-07 | Stop reason: HOSPADM

## 2021-05-07 RX ORDER — ONDANSETRON 2 MG/ML
4 INJECTION INTRAMUSCULAR; INTRAVENOUS EVERY 6 HOURS PRN
Status: DISCONTINUED | OUTPATIENT
Start: 2021-05-07 | End: 2021-05-09 | Stop reason: HOSPADM

## 2021-05-07 RX ORDER — ONDANSETRON 4 MG/1
4 TABLET, FILM COATED ORAL EVERY 8 HOURS PRN
Status: DISCONTINUED | OUTPATIENT
Start: 2021-05-07 | End: 2021-05-09 | Stop reason: HOSPADM

## 2021-05-07 RX ORDER — CALCIUM CARBONATE 200(500)MG
2 TABLET,CHEWABLE ORAL 3 TIMES DAILY PRN
Status: DISCONTINUED | OUTPATIENT
Start: 2021-05-07 | End: 2021-05-07 | Stop reason: HOSPADM

## 2021-05-07 RX ORDER — METHYLERGONOVINE MALEATE 0.2 MG/ML
200 INJECTION INTRAVENOUS ONCE AS NEEDED
Status: DISCONTINUED | OUTPATIENT
Start: 2021-05-07 | End: 2021-05-09 | Stop reason: HOSPADM

## 2021-05-07 RX ORDER — SODIUM CHLORIDE 0.9 % (FLUSH) 0.9 %
1-10 SYRINGE (ML) INJECTION AS NEEDED
Status: DISCONTINUED | OUTPATIENT
Start: 2021-05-07 | End: 2021-05-09 | Stop reason: HOSPADM

## 2021-05-07 RX ORDER — LIDOCAINE HYDROCHLORIDE 10 MG/ML
INJECTION, SOLUTION INFILTRATION; PERINEURAL
Status: DISCONTINUED
Start: 2021-05-07 | End: 2021-05-07 | Stop reason: WASHOUT

## 2021-05-07 RX ADMIN — DOCUSATE SODIUM 100 MG: 100 CAPSULE ORAL at 20:17

## 2021-05-07 RX ADMIN — SODIUM CHLORIDE, POTASSIUM CHLORIDE, SODIUM LACTATE AND CALCIUM CHLORIDE 125 ML/HR: 600; 310; 30; 20 INJECTION, SOLUTION INTRAVENOUS at 06:36

## 2021-05-07 RX ADMIN — OXYTOCIN-SODIUM CHLORIDE 0.9% IV SOLN 30 UNIT/500ML 2 MILLI-UNITS/MIN: 30-0.9/5 SOLUTION at 06:33

## 2021-05-07 RX ADMIN — HYDROCODONE BITARTRATE AND ACETAMINOPHEN 1 TABLET: 5; 325 TABLET ORAL at 22:57

## 2021-05-07 RX ADMIN — OXYTOCIN-SODIUM CHLORIDE 0.9% IV SOLN 30 UNIT/500ML 250 ML/HR: 30-0.9/5 SOLUTION at 14:25

## 2021-05-07 RX ADMIN — LIDOCAINE HYDROCHLORIDE AND EPINEPHRINE 3 ML: 15; 5 INJECTION, SOLUTION EPIDURAL at 10:25

## 2021-05-07 RX ADMIN — SODIUM CHLORIDE, POTASSIUM CHLORIDE, SODIUM LACTATE AND CALCIUM CHLORIDE 125 ML/HR: 600; 310; 30; 20 INJECTION, SOLUTION INTRAVENOUS at 10:34

## 2021-05-07 RX ADMIN — EPHEDRINE SULFATE 10 MG: 50 INJECTION INTRAVENOUS at 11:29

## 2021-05-07 RX ADMIN — ROPIVACAINE HYDROCHLORIDE 7 ML/HR: 2 INJECTION, SOLUTION EPIDURAL; INFILTRATION at 10:33

## 2021-05-07 RX ADMIN — IBUPROFEN 600 MG: 600 TABLET ORAL at 18:00

## 2021-05-07 RX ADMIN — HYDROCODONE BITARTRATE AND ACETAMINOPHEN 1 TABLET: 5; 325 TABLET ORAL at 18:00

## 2021-05-07 RX ADMIN — BENZOCAINE AND LEVOMENTHOL: 200; 5 SPRAY TOPICAL at 18:01

## 2021-05-07 RX ADMIN — EPHEDRINE SULFATE 10 MG: 50 INJECTION INTRAVENOUS at 11:24

## 2021-05-07 RX ADMIN — EPHEDRINE SULFATE 10 MG: 50 INJECTION INTRAVENOUS at 11:19

## 2021-05-07 RX ADMIN — ROPIVACAINE HYDROCHLORIDE 8 ML: 2 INJECTION, SOLUTION EPIDURAL; INFILTRATION at 10:29

## 2021-05-08 PROBLEM — Z34.90 TERM PREGNANCY: Status: ACTIVE | Noted: 2021-05-08

## 2021-05-08 LAB
BASOPHILS # BLD AUTO: 0.05 10*3/MM3 (ref 0–0.2)
BASOPHILS NFR BLD AUTO: 0.5 % (ref 0–1.5)
DEPRECATED RDW RBC AUTO: 39.3 FL (ref 37–54)
EOSINOPHIL # BLD AUTO: 0.09 10*3/MM3 (ref 0–0.4)
EOSINOPHIL NFR BLD AUTO: 0.8 % (ref 0.3–6.2)
ERYTHROCYTE [DISTWIDTH] IN BLOOD BY AUTOMATED COUNT: 12.6 % (ref 12.3–15.4)
HCT VFR BLD AUTO: 28.1 % (ref 34–46.6)
HGB BLD-MCNC: 9.4 G/DL (ref 12–15.9)
IMM GRANULOCYTES # BLD AUTO: 0.08 10*3/MM3 (ref 0–0.05)
IMM GRANULOCYTES NFR BLD AUTO: 0.7 % (ref 0–0.5)
LYMPHOCYTES # BLD AUTO: 2.48 10*3/MM3 (ref 0.7–3.1)
LYMPHOCYTES NFR BLD AUTO: 22.8 % (ref 19.6–45.3)
MCH RBC QN AUTO: 28.9 PG (ref 26.6–33)
MCHC RBC AUTO-ENTMCNC: 33.5 G/DL (ref 31.5–35.7)
MCV RBC AUTO: 86.5 FL (ref 79–97)
MONOCYTES # BLD AUTO: 0.67 10*3/MM3 (ref 0.1–0.9)
MONOCYTES NFR BLD AUTO: 6.2 % (ref 5–12)
NEUTROPHILS NFR BLD AUTO: 69 % (ref 42.7–76)
NEUTROPHILS NFR BLD AUTO: 7.51 10*3/MM3 (ref 1.7–7)
NRBC BLD AUTO-RTO: 0 /100 WBC (ref 0–0.2)
PLATELET # BLD AUTO: 202 10*3/MM3 (ref 140–450)
PMV BLD AUTO: 10 FL (ref 6–12)
RBC # BLD AUTO: 3.25 10*6/MM3 (ref 3.77–5.28)
WBC # BLD AUTO: 10.88 10*3/MM3 (ref 3.4–10.8)

## 2021-05-08 PROCEDURE — 85025 COMPLETE CBC W/AUTO DIFF WBC: CPT | Performed by: OBSTETRICS & GYNECOLOGY

## 2021-05-08 RX ORDER — OXYCODONE HYDROCHLORIDE AND ACETAMINOPHEN 5; 325 MG/1; MG/1
1 TABLET ORAL EVERY 4 HOURS PRN
Status: DISCONTINUED | OUTPATIENT
Start: 2021-05-08 | End: 2021-05-09 | Stop reason: HOSPADM

## 2021-05-08 RX ADMIN — HYDROCODONE BITARTRATE AND ACETAMINOPHEN 1 TABLET: 5; 325 TABLET ORAL at 03:01

## 2021-05-08 RX ADMIN — OXYCODONE HYDROCHLORIDE AND ACETAMINOPHEN 1 TABLET: 5; 325 TABLET ORAL at 23:10

## 2021-05-08 RX ADMIN — DOCUSATE SODIUM 100 MG: 100 CAPSULE ORAL at 08:19

## 2021-05-08 RX ADMIN — IBUPROFEN 600 MG: 600 TABLET ORAL at 12:24

## 2021-05-08 RX ADMIN — IBUPROFEN 600 MG: 600 TABLET ORAL at 03:01

## 2021-05-08 RX ADMIN — OXYCODONE HYDROCHLORIDE AND ACETAMINOPHEN 1 TABLET: 5; 325 TABLET ORAL at 14:51

## 2021-05-08 RX ADMIN — IBUPROFEN 600 MG: 600 TABLET ORAL at 21:32

## 2021-05-08 RX ADMIN — OXYCODONE HYDROCHLORIDE AND ACETAMINOPHEN 1 TABLET: 5; 325 TABLET ORAL at 19:12

## 2021-05-08 RX ADMIN — OXYCODONE HYDROCHLORIDE AND ACETAMINOPHEN 1 TABLET: 5; 325 TABLET ORAL at 08:19

## 2021-05-08 RX ADMIN — DOCUSATE SODIUM 100 MG: 100 CAPSULE ORAL at 21:27

## 2021-05-08 NOTE — ANESTHESIA POSTPROCEDURE EVALUATION
"Patient: Urbaon Pineda    Procedure Summary     Date: 05/07/21 Room / Location:     Anesthesia Start: 1013 Anesthesia Stop: 1317    Procedure: LABOR ANALGESIA Diagnosis:     Scheduled Providers:  Provider: Wilbert Ngo CRNA    Anesthesia Type: epidural ASA Status: 2          Anesthesia Type: epidural    Vitals  Vitals Value Taken Time   /65 05/08/21 1151   Temp 97.6 °F (36.4 °C) 05/08/21 1151   Pulse 62 05/08/21 1151   Resp 20 05/08/21 1151   SpO2 100 % 05/08/21 1151           Post Anesthesia Care and Evaluation    Patient location during evaluation: bedside  Patient participation: complete - patient participated  Level of consciousness: awake and alert  Pain management: adequate  Airway patency: patent  Anesthetic complications: No anesthetic complications    Cardiovascular status: acceptable  Respiratory status: acceptable  Hydration status: acceptable  Post Neuraxial Block status: Motor and sensory function returned to baseline and No signs or symptoms of PDPH  Comments: Blood pressure 110/65, pulse 62, temperature 97.6 °F (36.4 °C), temperature source Temporal, resp. rate 20, height 162.6 cm (64\"), weight 68 kg (150 lb), SpO2 100 %, currently breastfeeding.    Pt discharged from PACU based on oliver score >8      "

## 2021-05-09 VITALS
RESPIRATION RATE: 17 BRPM | TEMPERATURE: 97.9 F | HEIGHT: 64 IN | OXYGEN SATURATION: 99 % | WEIGHT: 150 LBS | DIASTOLIC BLOOD PRESSURE: 67 MMHG | BODY MASS INDEX: 25.61 KG/M2 | HEART RATE: 76 BPM | SYSTOLIC BLOOD PRESSURE: 94 MMHG

## 2021-05-09 PROBLEM — Z34.90 TERM PREGNANCY: Status: RESOLVED | Noted: 2021-05-08 | Resolved: 2021-05-09

## 2021-05-09 RX ORDER — OXYCODONE HYDROCHLORIDE AND ACETAMINOPHEN 5; 325 MG/1; MG/1
1 TABLET ORAL EVERY 4 HOURS PRN
Qty: 12 TABLET | Refills: 0 | Status: SHIPPED | OUTPATIENT
Start: 2021-05-09 | End: 2021-05-18

## 2021-05-09 RX ADMIN — OXYCODONE HYDROCHLORIDE AND ACETAMINOPHEN 1 TABLET: 5; 325 TABLET ORAL at 06:33

## 2021-05-09 RX ADMIN — IBUPROFEN 600 MG: 600 TABLET ORAL at 06:33

## 2021-05-09 RX ADMIN — DOCUSATE SODIUM 100 MG: 100 CAPSULE ORAL at 07:59

## 2021-05-09 RX ADMIN — OXYCODONE HYDROCHLORIDE AND ACETAMINOPHEN 1 TABLET: 5; 325 TABLET ORAL at 11:56

## 2021-05-09 RX ADMIN — OXYCODONE HYDROCHLORIDE AND ACETAMINOPHEN 1 TABLET: 5; 325 TABLET ORAL at 02:53

## 2021-05-10 ENCOUNTER — TELEPHONE (OUTPATIENT)
Dept: INTERNAL MEDICINE | Facility: CLINIC | Age: 26
End: 2021-05-10

## 2021-05-10 NOTE — TELEPHONE ENCOUNTER
Called PT from  discharge list and PT had just had her baby.  I informed PT of our clinic and services in [place].  PT was glad I called.

## 2021-05-11 LAB
BH BB BLOOD EXPIRATION DATE: NORMAL
BH BB BLOOD EXPIRATION DATE: NORMAL
BH BB BLOOD TYPE BARCODE: 9500
BH BB BLOOD TYPE BARCODE: 9500
BH BB DISPENSE STATUS: NORMAL
BH BB DISPENSE STATUS: NORMAL
BH BB PRODUCT CODE: NORMAL
BH BB PRODUCT CODE: NORMAL
BH BB UNIT NUMBER: NORMAL
BH BB UNIT NUMBER: NORMAL
CROSSMATCH INTERPRETATION: NORMAL
CROSSMATCH INTERPRETATION: NORMAL
UNIT  ABO: NORMAL
UNIT  ABO: NORMAL
UNIT  RH: NORMAL
UNIT  RH: NORMAL

## 2021-05-21 LAB
CYTO UR: NORMAL
LAB AP CASE REPORT: NORMAL
LAB AP CLINICAL INFORMATION: NORMAL
PATH REPORT.FINAL DX SPEC: NORMAL
PATH REPORT.GROSS SPEC: NORMAL

## 2021-07-02 ENCOUNTER — POSTPARTUM VISIT (OUTPATIENT)
Dept: OBSTETRICS AND GYNECOLOGY | Facility: CLINIC | Age: 26
End: 2021-07-02

## 2021-07-02 VITALS
HEIGHT: 64 IN | WEIGHT: 126 LBS | SYSTOLIC BLOOD PRESSURE: 102 MMHG | DIASTOLIC BLOOD PRESSURE: 70 MMHG | BODY MASS INDEX: 21.51 KG/M2

## 2021-07-02 PROCEDURE — 0503F POSTPARTUM CARE VISIT: CPT | Performed by: OBSTETRICS & GYNECOLOGY

## 2021-07-02 NOTE — PROGRESS NOTES
"Subjective   Chief Complaint   Patient presents with   • Postpartum Care     pt here today for 8 week PP visit. pt had baby girl named Juju Hooper. Pt is currently breastfeeding. pt voices no concerns.      Urbano Pineda is a 25 y.o. year old  presenting to be seen for her postpartum visit.  She had a vaginal delivery 8 weeks ago.   Prenatal course was been benign.  Lochia has resolved.    Since delivery she has been sexually active and reports that it was just a little uncomfortable.  She does not have concerns about post-partum blues/depression.   She is breast feeding and plans to continues for 6 month(s).    The following portions of the patient's history were reviewed and updated as appropriate:current medications and allergies    Social History    Tobacco Use      Smoking status: Current Every Day Smoker        Packs/day: 0.50        Types: Cigarettes      Smokeless tobacco: Never Used    Review of Systems      Objective   /70   Ht 162.6 cm (64\")   Wt 57.2 kg (126 lb)   Breastfeeding Yes   BMI 21.63 kg/m²     General:  well developed; well nourished  no acute distress   Abdomen: Not performed.   Pelvis: Not performed.          Assessment   1. Normal 8 week postpartum exam  2. Doing well, no depression or blues     Plan   1. BC options reviewed and compared today: Depo-Provera, IUD - Mirena, Nexplanon, OCP (estrogen/progesterone) and Ortho-Evra  2. Consent for Mirena signed.  Patient will be called when it is available.  3. Also scheduling annual exam in 4 months as she checks out today    No orders of the defined types were placed in this encounter.         This note was electronically signed.    Yesica Levine MD  2021    "

## 2021-09-10 ENCOUNTER — PROCEDURE VISIT (OUTPATIENT)
Dept: OBSTETRICS AND GYNECOLOGY | Facility: CLINIC | Age: 26
End: 2021-09-10

## 2021-09-10 VITALS
DIASTOLIC BLOOD PRESSURE: 62 MMHG | HEIGHT: 64 IN | SYSTOLIC BLOOD PRESSURE: 96 MMHG | BODY MASS INDEX: 20.83 KG/M2 | WEIGHT: 122 LBS

## 2021-09-10 DIAGNOSIS — Z87.891 FORMER SMOKER: ICD-10-CM

## 2021-09-10 DIAGNOSIS — Z30.430 ENCOUNTER FOR IUD INSERTION: Primary | ICD-10-CM

## 2021-09-10 PROCEDURE — 58300 INSERT INTRAUTERINE DEVICE: CPT | Performed by: OBSTETRICS & GYNECOLOGY

## 2021-09-10 PROCEDURE — 81025 URINE PREGNANCY TEST: CPT | Performed by: OBSTETRICS & GYNECOLOGY

## 2021-09-10 NOTE — PROGRESS NOTES
"Subjective   Chief Complaint   Patient presents with   • Procedure     pt here for IUD insertion     Urbano Pineda is a 25 y.o. year old .  No LMP recorded.  She presents to be seen for IUD placement.     The following portions of the patient's history were reviewed and updated as appropriate:current medications and allergies    Social History    Tobacco Use      Smoking status: Former Smoker        Packs/day: 0.50        Types: Cigarettes      Smokeless tobacco: Never Used    Review of Systems   Constitutional: Negative for activity change and unexpected weight change.   Respiratory: Negative for shortness of breath.    Cardiovascular: Negative for chest pain.   Gastrointestinal: Negative for abdominal pain.   Genitourinary: Negative for pelvic pain and vaginal bleeding.         Objective   BP 96/62 (BP Location: Left arm, Patient Position: Sitting, Cuff Size: Adult)   Ht 162.6 cm (64\")   Wt 55.3 kg (122 lb)   Breastfeeding Yes   BMI 20.94 kg/m²     Physical Exam  Vitals and nursing note reviewed.   Constitutional:       General: She is not in acute distress.     Appearance: She is well-developed.   HENT:      Head: Normocephalic and atraumatic.   Neck:      Thyroid: No thyromegaly.   Pulmonary:      Effort: Pulmonary effort is normal.   Abdominal:      General: There is no distension.      Palpations: Abdomen is soft.      Tenderness: There is no abdominal tenderness.   Genitourinary:     General: Normal vulva.      Comments: Cervix normal in appearance.  Cervix washed with Betadine and grasped on the anterior lip with a single-tooth tenaculum.  Internal os dilated with a Jose os finder.  Mirena IUD easily inserted.  Uterus sounded to 7 cm.  Tenaculum site barely oozing upon removal.  Patient tolerated insertion well.  Strings were clipped 1 cm past the external os  Musculoskeletal:         General: Normal range of motion.      Cervical back: Normal range of motion.   Skin:     General: Skin is warm " and dry.   Neurological:      Mental Status: She is alert and oriented to person, place, and time.   Psychiatric:         Behavior: Behavior normal.         Judgment: Judgment normal.         Lab Review   No data reviewed    Imaging   No data reviewed     Assessment & Plan    Diagnoses and all orders for this visit:    1. Encounter for IUD insertion (Primary): Patient tolerated insertion well.  She will return to the office in 4 weeks for an ultrasound to check placement.  -     POC Pregnancy, Urine  -     Chlamydia trachomatis, Neisseria gonorrhoeae, PCR w/ confirmation - Urine, Urine, Clean Catch    2. Former smoker      This note was electronically signed.    Yesica Levine MD  September 10, 2021  14:03 CDT

## 2021-09-14 LAB
C TRACH RRNA SPEC QL NAA+PROBE: NEGATIVE
N GONORRHOEA RRNA SPEC QL NAA+PROBE: NEGATIVE

## 2021-10-13 ENCOUNTER — OFFICE VISIT (OUTPATIENT)
Dept: OBSTETRICS AND GYNECOLOGY | Facility: CLINIC | Age: 26
End: 2021-10-13

## 2021-10-13 VITALS
DIASTOLIC BLOOD PRESSURE: 62 MMHG | HEIGHT: 64 IN | WEIGHT: 127 LBS | BODY MASS INDEX: 21.68 KG/M2 | SYSTOLIC BLOOD PRESSURE: 102 MMHG

## 2021-10-13 DIAGNOSIS — Z30.431 IUD CHECK UP: Primary | ICD-10-CM

## 2021-10-13 PROCEDURE — 99212 OFFICE O/P EST SF 10 MIN: CPT | Performed by: OBSTETRICS & GYNECOLOGY

## 2021-10-13 NOTE — PROGRESS NOTES
"Subjective   Chief Complaint   Patient presents with   • iud check     pt here today for IUD check. pt had ultrasound today. pt voices no concerns.      Urbano Pineda is a 25 y.o. year old .  No LMP recorded. Patient has had an implant.  She presents to be seen for IUD check up.  IUD placed 4 weeks ago.  Patient reports intermittent spotting, but is not having any cramping.  She is pleased.     The following portions of the patient's history were reviewed and updated as appropriate:current medications and allergies    Social History    Tobacco Use      Smoking status: Former Smoker        Packs/day: 0.50        Types: Cigarettes      Smokeless tobacco: Never Used    Review of Systems   Constitutional: Negative for activity change and unexpected weight change.   Respiratory: Negative for shortness of breath.    Cardiovascular: Negative for chest pain.   Gastrointestinal: Negative for abdominal pain.   Genitourinary: Positive for vaginal bleeding (scant spotting). Negative for pelvic pain.         Objective   /62   Ht 162.6 cm (64\")   Wt 57.6 kg (127 lb)   BMI 21.80 kg/m²     Physical Exam  Vitals and nursing note reviewed.   Constitutional:       General: She is not in acute distress.     Appearance: She is well-developed.   HENT:      Head: Normocephalic and atraumatic.   Neck:      Thyroid: No thyromegaly.   Pulmonary:      Effort: Pulmonary effort is normal.   Abdominal:      General: There is no distension.      Palpations: Abdomen is soft.      Tenderness: There is no abdominal tenderness.   Genitourinary:     Comments: Normal pelvic u/s, with IUD in correct location  Musculoskeletal:         General: Normal range of motion.      Cervical back: Normal range of motion.   Skin:     General: Skin is warm and dry.   Neurological:      Mental Status: She is alert and oriented to person, place, and time.   Psychiatric:         Behavior: Behavior normal.         Judgment: Judgment normal.         Lab " Review   No data reviewed    Imaging   Pelvic ultrasound report     Assessment & Plan  Diagnoses and all orders for this visit:    1. IUD check up (Primary): Patient pleased with IUD and having only intermittent spotting.  Reassured patient that this should resolve in upcoming weeks.  Patient to RTO for next annual exam.        This note was electronically signed.    Yesica Levine MD  October 13, 2021  10:07 CDT    Total time spent today with Urbano  was 10-19 minutes (level 2).  Greater than 50% of the time was spent coordinating care, answering her questions and counseling regarding pathophysiology of her presenting problem along with plans for any diagnositc work-up and treatment.

## 2022-03-03 ENCOUNTER — OFFICE VISIT (OUTPATIENT)
Dept: INTERNAL MEDICINE | Facility: CLINIC | Age: 27
End: 2022-03-03

## 2022-03-03 VITALS
HEIGHT: 64 IN | OXYGEN SATURATION: 98 % | RESPIRATION RATE: 17 BRPM | DIASTOLIC BLOOD PRESSURE: 45 MMHG | HEART RATE: 76 BPM | WEIGHT: 121.01 LBS | BODY MASS INDEX: 20.66 KG/M2 | SYSTOLIC BLOOD PRESSURE: 84 MMHG | TEMPERATURE: 98 F

## 2022-03-03 DIAGNOSIS — R20.0 BILATERAL LEG NUMBNESS: Primary | ICD-10-CM

## 2022-03-03 PROCEDURE — 96372 THER/PROPH/DIAG INJ SC/IM: CPT | Performed by: NURSE PRACTITIONER

## 2022-03-03 PROCEDURE — 99203 OFFICE O/P NEW LOW 30 MIN: CPT | Performed by: NURSE PRACTITIONER

## 2022-03-03 RX ORDER — METHYLPREDNISOLONE SODIUM SUCCINATE 125 MG/2ML
80 INJECTION, POWDER, LYOPHILIZED, FOR SOLUTION INTRAMUSCULAR; INTRAVENOUS ONCE
Status: COMPLETED | OUTPATIENT
Start: 2022-03-03 | End: 2022-03-03

## 2022-03-03 RX ADMIN — METHYLPREDNISOLONE SODIUM SUCCINATE 80 MG: 125 INJECTION, POWDER, LYOPHILIZED, FOR SOLUTION INTRAMUSCULAR; INTRAVENOUS at 15:34

## 2022-03-03 NOTE — PROGRESS NOTES
"        Subjective     Chief Complaint   Patient presents with   • Numbness     Bilateral legs. Establishing care. Symptoms began on .        History of Present Illness  Pt comes in today with complaints of bilateral leg numbness. States she awakened  and tried to get out of bed and her legs were numb and she almost fell. Has been intermittently getting numb ever since. No loss of bowel or bladder function. Yesterday, she started having pain in the left leg lateral side. Pain extended all the way to ankle. 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 Does admit that her back pain is worse than her baseline. She has been working as a CNA despite having pain. Rates pain as a 6.    Review of Systems   Otherwise complete ROS reviewed and negative except as mentioned in the HPI.    Past Medical History:   Past Medical History:   Diagnosis Date   • Spon  w/o complication      Past Surgical History:  Past Surgical History:   Procedure Laterality Date   • WISDOM TOOTH EXTRACTION       Social History:  reports that she quit smoking about 14 months ago. Her smoking use included cigarettes. She smoked 0.50 packs per day. She has never used smokeless tobacco. She reports that she does not drink alcohol and does not use drugs.    Family History: family history includes No Known Problems in her brother, father, maternal grandmother, mother, paternal grandfather, paternal grandmother, and sister.      Allergies:  Allergies   Allergen Reactions   • Cephalexin Other (See Comments)     Passed out     Medications:  Prior to Admission medications    Not on File       Objective     Vital Signs: BP (!) 84/45 (BP Location: Left arm, Patient Position: Sitting, Cuff Size: Adult)   Pulse 76   Temp 98 °F (36.7 °C) (Skin)   Resp 17   Ht 162.6 cm (64\")   Wt 54.9 kg (121 lb 0.2 oz)   SpO2 98%   BMI 20.77 kg/m²   Physical Exam  Vitals reviewed.   Constitutional:       Appearance: She is well-developed.   HENT:      " Head: Normocephalic and atraumatic.      Mouth/Throat:      Mouth: Mucous membranes are moist.   Eyes:      Pupils: Pupils are equal, round, and reactive to light.   Neck:      Vascular: No JVD.   Cardiovascular:      Rate and Rhythm: Normal rate and regular rhythm.   Pulmonary:      Effort: Pulmonary effort is normal.      Breath sounds: Normal breath sounds.   Abdominal:      General: Bowel sounds are normal.      Palpations: Abdomen is soft.   Musculoskeletal:         General: No deformity.      Cervical back: Normal range of motion and neck supple.      Comments: Mildly tender left sciatic area. Mild left paraspinal tenderness.    Lymphadenopathy:      Cervical: No cervical adenopathy.   Skin:     General: Skin is warm and dry.   Neurological:      Mental Status: She is alert and oriented to person, place, and time.   Psychiatric:         Behavior: Behavior normal.         Thought Content: Thought content normal.         Judgment: Judgment normal.       Results Reviewed:  Glucose   Date Value Ref Range Status   04/16/2020 82 70 - 100 mg/dL Final     BUN   Date Value Ref Range Status   04/16/2020 9 5 - 21 mg/dL Final     Creatinine   Date Value Ref Range Status   04/16/2020 0.71 0.50 - 1.40 mg/dL Final     Sodium   Date Value Ref Range Status   04/16/2020 141 135 - 145 mmol/L Final     Potassium   Date Value Ref Range Status   04/16/2020 4.1 3.5 - 5.3 mmol/L Final     Chloride   Date Value Ref Range Status   04/16/2020 100 98 - 110 mmol/L Final     CO2   Date Value Ref Range Status   04/16/2020 30.0 24.0 - 31.0 mmol/L Final     Calcium   Date Value Ref Range Status   04/16/2020 9.2 8.4 - 10.4 mg/dL Final     ALT (SGPT)   Date Value Ref Range Status   04/16/2020 19 0 - 35 U/L Final     AST (SGOT)   Date Value Ref Range Status   04/16/2020 21 7 - 45 U/L Final     WBC   Date Value Ref Range Status   05/08/2021 10.88 (H) 3.40 - 10.80 10*3/mm3 Final   10/07/2020 6.2 3.4 - 10.8 x10E3/uL Final     Hematocrit   Date  Value Ref Range Status   05/08/2021 28.1 (L) 34.0 - 46.6 % Final     Platelets   Date Value Ref Range Status   05/08/2021 202 140 - 450 10*3/mm3 Final         Assessment / Plan     Assessment/Plan:  Diagnoses and all orders for this visit:    1. Bilateral leg numbness (Primary)  -     XR Spine Lumbar 2 or 3 View (In Office)  -     methylPREDNISolone sodium succinate (SOLU-Medrol) injection 80 mg    Xr shows no acute abnormality.     Return in about 1 week (around 3/10/2022). unless patient needs to be seen sooner or acute issues arise.    Code Status: Full.     I have discussed the patient results/orders and and plan/recommendation with them at today's visit.      Clare Gabriel, APRN   03/03/2022

## 2022-03-10 ENCOUNTER — OFFICE VISIT (OUTPATIENT)
Dept: INTERNAL MEDICINE | Facility: CLINIC | Age: 27
End: 2022-03-10

## 2022-03-10 VITALS
OXYGEN SATURATION: 99 % | BODY MASS INDEX: 20.49 KG/M2 | HEIGHT: 64 IN | TEMPERATURE: 97.7 F | SYSTOLIC BLOOD PRESSURE: 98 MMHG | WEIGHT: 120 LBS | RESPIRATION RATE: 17 BRPM | DIASTOLIC BLOOD PRESSURE: 62 MMHG | HEART RATE: 71 BPM

## 2022-03-10 DIAGNOSIS — R20.0 BILATERAL LEG NUMBNESS: Primary | ICD-10-CM

## 2022-03-10 DIAGNOSIS — R29.898 WEAKNESS OF BOTH LOWER EXTREMITIES: ICD-10-CM

## 2022-03-10 PROCEDURE — 99213 OFFICE O/P EST LOW 20 MIN: CPT | Performed by: NURSE PRACTITIONER

## 2022-03-10 NOTE — PROGRESS NOTES
"        Subjective     Chief Complaint   Patient presents with   • Numbness     Bilateral Legs         History of Present Illness  Pt comes in today to follow up on bilateral numbness in her legs. She is some better after steroids. No loss of bowel or bladder function. States she still has weakness some tingling. She has some mild but chronic back pain.     Review of Systems   Otherwise complete ROS reviewed and negative except as mentioned in the HPI.    Past Medical History:   Past Medical History:   Diagnosis Date   • Spon  w/o complication      Past Surgical History:  Past Surgical History:   Procedure Laterality Date   • WISDOM TOOTH EXTRACTION       Social History:  reports that she quit smoking about 14 months ago. Her smoking use included cigarettes. She smoked 0.50 packs per day. She has never used smokeless tobacco. She reports that she does not drink alcohol and does not use drugs.    Family History: family history includes No Known Problems in her brother, father, maternal grandmother, mother, paternal grandfather, paternal grandmother, and sister.       Allergies:  Allergies   Allergen Reactions   • Cephalexin Other (See Comments)     Passed out     Medications:  Prior to Admission medications    Not on File       Objective     Vital Signs: BP 98/62 (BP Location: Left arm, Patient Position: Sitting, Cuff Size: Adult)   Pulse 71   Temp 97.7 °F (36.5 °C)   Resp 17   Ht 162.6 cm (64\")   Wt 54.4 kg (120 lb)   SpO2 99%   BMI 20.60 kg/m²   Physical Exam  Vitals reviewed.   Constitutional:       Appearance: She is well-developed.   HENT:      Head: Normocephalic and atraumatic.   Eyes:      Pupils: Pupils are equal, round, and reactive to light.   Neck:      Vascular: No JVD.   Cardiovascular:      Rate and Rhythm: Normal rate and regular rhythm.      Pulses:           Dorsalis pedis pulses are 2+ on the right side and 2+ on the left side.   Pulmonary:      Effort: Pulmonary effort is normal. "   Abdominal:      General: Bowel sounds are normal.      Palpations: Abdomen is soft.   Musculoskeletal:         General: Tenderness ( lumbar paraspinal tenderness) present. No deformity.      Cervical back: Normal range of motion and neck supple.      Right hip: Normal range of motion.      Left hip: Normal range of motion.      Right upper leg: Normal.      Left upper leg: Normal.      Right lower leg: Normal.      Left lower leg: Normal.      Right ankle: Normal.      Left ankle: Normal.      Right foot: Normal.      Left foot: Normal.   Feet:      Right foot:      Protective Sensation: 10 sites tested. 10 sites sensed.      Skin integrity: Skin integrity normal.      Left foot:      Protective Sensation: 10 sites tested. 10 sites sensed.      Skin integrity: Skin integrity normal.   Lymphadenopathy:      Cervical: No cervical adenopathy.   Skin:     General: Skin is warm and dry.   Neurological:      Mental Status: She is alert and oriented to person, place, and time.      Sensory: Sensation is intact.      Motor: Motor function is intact.      Deep Tendon Reflexes:      Reflex Scores:       Patellar reflexes are 2+ on the right side and 2+ on the left side.       Achilles reflexes are 2+ on the right side and 2+ on the left side.  Psychiatric:         Behavior: Behavior normal.         Thought Content: Thought content normal.         Judgment: Judgment normal.         Patient's Body mass index is 20.6 kg/m². indicating that she is within normal range (BMI 18.5-24.9). No BMI management plan needed..      Results Reviewed:  Glucose   Date Value Ref Range Status   04/16/2020 82 70 - 100 mg/dL Final     BUN   Date Value Ref Range Status   04/16/2020 9 5 - 21 mg/dL Final     Creatinine   Date Value Ref Range Status   04/16/2020 0.71 0.50 - 1.40 mg/dL Final     Sodium   Date Value Ref Range Status   04/16/2020 141 135 - 145 mmol/L Final     Potassium   Date Value Ref Range Status   04/16/2020 4.1 3.5 - 5.3 mmol/L  Final     Chloride   Date Value Ref Range Status   04/16/2020 100 98 - 110 mmol/L Final     CO2   Date Value Ref Range Status   04/16/2020 30.0 24.0 - 31.0 mmol/L Final     Calcium   Date Value Ref Range Status   04/16/2020 9.2 8.4 - 10.4 mg/dL Final     ALT (SGPT)   Date Value Ref Range Status   04/16/2020 19 0 - 35 U/L Final     AST (SGOT)   Date Value Ref Range Status   04/16/2020 21 7 - 45 U/L Final     WBC   Date Value Ref Range Status   05/08/2021 10.88 (H) 3.40 - 10.80 10*3/mm3 Final   10/07/2020 6.2 3.4 - 10.8 x10E3/uL Final     Hematocrit   Date Value Ref Range Status   05/08/2021 28.1 (L) 34.0 - 46.6 % Final     Platelets   Date Value Ref Range Status   05/08/2021 202 140 - 450 10*3/mm3 Final         Assessment / Plan     Assessment/Plan:  Diagnoses and all orders for this visit:    1. Bilateral leg numbness (Primary)  -     CBC & Differential  -     Magnesium  -     CK  -     MRI Lumbar Spine Without Contrast; Future    2. Weakness of both lower extremities  -     CBC & Differential  -     Comprehensive Metabolic Panel  -     CK  -     MRI Lumbar Spine Without Contrast; Future        No follow-ups on file. unless patient needs to be seen sooner or acute issues arise.    Code Status: Full    I have discussed the patient results/orders and and plan/recommendation with them at today's visit.      Clare Gabriel, APRN   03/10/2022

## 2022-03-12 LAB
ALBUMIN SERPL-MCNC: 4.7 G/DL (ref 3.9–5)
ALBUMIN/GLOB SERPL: 2 {RATIO} (ref 1.2–2.2)
ALP SERPL-CCNC: 108 IU/L (ref 44–121)
ALT SERPL-CCNC: 11 IU/L (ref 0–32)
AST SERPL-CCNC: 12 IU/L (ref 0–40)
BASOPHILS # BLD AUTO: 0.1 X10E3/UL (ref 0–0.2)
BASOPHILS NFR BLD AUTO: 1 %
BILIRUB SERPL-MCNC: <0.2 MG/DL (ref 0–1.2)
BUN SERPL-MCNC: 12 MG/DL (ref 6–20)
BUN/CREAT SERPL: 17 (ref 9–23)
CALCIUM SERPL-MCNC: 8.7 MG/DL (ref 8.7–10.2)
CHLORIDE SERPL-SCNC: 104 MMOL/L (ref 96–106)
CK SERPL-CCNC: 45 U/L (ref 32–182)
CO2 SERPL-SCNC: 21 MMOL/L (ref 20–29)
CREAT SERPL-MCNC: 0.7 MG/DL (ref 0.57–1)
EGFR GENE MUT ANL BLD/T: 123 ML/MIN/1.73
EOSINOPHIL # BLD AUTO: 0.3 X10E3/UL (ref 0–0.4)
EOSINOPHIL NFR BLD AUTO: 4 %
ERYTHROCYTE [DISTWIDTH] IN BLOOD BY AUTOMATED COUNT: 13.2 % (ref 11.7–15.4)
GLOBULIN SER CALC-MCNC: 2.4 G/DL (ref 1.5–4.5)
GLUCOSE SERPL-MCNC: 74 MG/DL (ref 65–99)
HCT VFR BLD AUTO: 41 % (ref 34–46.6)
HGB BLD-MCNC: 13.4 G/DL (ref 11.1–15.9)
IMM GRANULOCYTES # BLD AUTO: 0 X10E3/UL (ref 0–0.1)
IMM GRANULOCYTES NFR BLD AUTO: 0 %
LYMPHOCYTES # BLD AUTO: 2.9 X10E3/UL (ref 0.7–3.1)
LYMPHOCYTES NFR BLD AUTO: 42 %
MAGNESIUM SERPL-MCNC: 2.1 MG/DL (ref 1.6–2.3)
MCH RBC QN AUTO: 28.7 PG (ref 26.6–33)
MCHC RBC AUTO-ENTMCNC: 32.7 G/DL (ref 31.5–35.7)
MCV RBC AUTO: 88 FL (ref 79–97)
MONOCYTES # BLD AUTO: 0.4 X10E3/UL (ref 0.1–0.9)
MONOCYTES NFR BLD AUTO: 6 %
NEUTROPHILS # BLD AUTO: 3.1 X10E3/UL (ref 1.4–7)
NEUTROPHILS NFR BLD AUTO: 47 %
PLATELET # BLD AUTO: 279 X10E3/UL (ref 150–450)
POTASSIUM SERPL-SCNC: 4.1 MMOL/L (ref 3.5–5.2)
PROT SERPL-MCNC: 7.1 G/DL (ref 6–8.5)
RBC # BLD AUTO: 4.67 X10E6/UL (ref 3.77–5.28)
SODIUM SERPL-SCNC: 140 MMOL/L (ref 134–144)
WBC # BLD AUTO: 6.9 X10E3/UL (ref 3.4–10.8)

## 2022-03-31 ENCOUNTER — HOSPITAL ENCOUNTER (OUTPATIENT)
Dept: MRI IMAGING | Facility: HOSPITAL | Age: 27
Discharge: HOME | End: 2022-03-31
Admitting: NURSE PRACTITIONER

## 2022-03-31 DIAGNOSIS — R20.0 BILATERAL LEG NUMBNESS: ICD-10-CM

## 2022-03-31 DIAGNOSIS — R29.898 WEAKNESS OF BOTH LOWER EXTREMITIES: ICD-10-CM

## 2022-03-31 PROCEDURE — 72148 MRI LUMBAR SPINE W/O DYE: CPT

## 2022-04-01 NOTE — PROGRESS NOTES
Called pt with results. Pt voiced understanding. Patient is feeling okay. Still having some problems but not as severe as they were at visit.

## 2022-05-03 ENCOUNTER — OFFICE VISIT (OUTPATIENT)
Dept: OBSTETRICS AND GYNECOLOGY | Facility: CLINIC | Age: 27
End: 2022-05-03

## 2022-05-03 VITALS
SYSTOLIC BLOOD PRESSURE: 98 MMHG | HEIGHT: 64 IN | DIASTOLIC BLOOD PRESSURE: 60 MMHG | WEIGHT: 119 LBS | BODY MASS INDEX: 20.32 KG/M2

## 2022-05-03 DIAGNOSIS — Z01.419 ENCOUNTER FOR GYNECOLOGICAL EXAMINATION WITHOUT ABNORMAL FINDING: ICD-10-CM

## 2022-05-03 DIAGNOSIS — Z87.891 FORMER SMOKER: ICD-10-CM

## 2022-05-03 DIAGNOSIS — Z01.419 WELL WOMAN EXAM WITH ROUTINE GYNECOLOGICAL EXAM: Primary | ICD-10-CM

## 2022-05-03 DIAGNOSIS — Z80.3 FAMILY HISTORY OF BREAST CANCER: ICD-10-CM

## 2022-05-03 PROCEDURE — 99395 PREV VISIT EST AGE 18-39: CPT | Performed by: OBSTETRICS & GYNECOLOGY

## 2022-05-03 PROCEDURE — 87661 TRICHOMONAS VAGINALIS AMPLIF: CPT | Performed by: OBSTETRICS & GYNECOLOGY

## 2022-05-03 PROCEDURE — 87591 N.GONORRHOEAE DNA AMP PROB: CPT | Performed by: OBSTETRICS & GYNECOLOGY

## 2022-05-03 PROCEDURE — 2014F MENTAL STATUS ASSESS: CPT | Performed by: OBSTETRICS & GYNECOLOGY

## 2022-05-03 PROCEDURE — 87491 CHLMYD TRACH DNA AMP PROBE: CPT | Performed by: OBSTETRICS & GYNECOLOGY

## 2022-05-03 PROCEDURE — G0123 SCREEN CERV/VAG THIN LAYER: HCPCS | Performed by: OBSTETRICS & GYNECOLOGY

## 2022-05-03 PROCEDURE — 3008F BODY MASS INDEX DOCD: CPT | Performed by: OBSTETRICS & GYNECOLOGY

## 2022-05-04 ENCOUNTER — TELEPHONE (OUTPATIENT)
Dept: ONCOLOGY | Facility: HOSPITAL | Age: 27
End: 2022-05-04

## 2022-05-04 LAB
C TRACH RRNA CVX QL NAA+PROBE: NOT DETECTED
GEN CATEG CVX/VAG CYTO-IMP: NORMAL
LAB AP CASE REPORT: NORMAL
LAB AP GYN ADDITIONAL INFORMATION: NORMAL
LAB AP GYN OTHER FINDINGS: NORMAL
N GONORRHOEA RRNA SPEC QL NAA+PROBE: NOT DETECTED
PATH INTERP SPEC-IMP: NORMAL
STAT OF ADQ CVX/VAG CYTO-IMP: NORMAL
TRICHOMONAS VAGINALIS PCR: NOT DETECTED

## 2022-10-04 ENCOUNTER — OFFICE VISIT (OUTPATIENT)
Dept: INTERNAL MEDICINE | Facility: CLINIC | Age: 27
End: 2022-10-04

## 2022-10-04 VITALS
RESPIRATION RATE: 19 BRPM | HEIGHT: 64 IN | OXYGEN SATURATION: 99 % | TEMPERATURE: 99.1 F | SYSTOLIC BLOOD PRESSURE: 99 MMHG | WEIGHT: 125.8 LBS | BODY MASS INDEX: 21.48 KG/M2 | HEART RATE: 61 BPM | DIASTOLIC BLOOD PRESSURE: 59 MMHG

## 2022-10-04 DIAGNOSIS — N89.8 VAGINAL DISCHARGE: ICD-10-CM

## 2022-10-04 DIAGNOSIS — B37.31 VAGINAL YEAST INFECTION: Primary | ICD-10-CM

## 2022-10-04 PROCEDURE — 99213 OFFICE O/P EST LOW 20 MIN: CPT

## 2022-10-04 RX ORDER — MICONAZOLE NITRATE 1200MG-2%
1 KIT VAGINAL ONCE
Qty: 1 KIT | Refills: 1 | Status: SHIPPED | OUTPATIENT
Start: 2022-10-04 | End: 2022-10-04

## 2022-10-04 NOTE — PROGRESS NOTES
"        Subjective     Chief Complaint   Patient presents with   • Vaginitis     Symptoms began 3 days ago.        History of Present Illness  Patient presents today with complaints of possible yeast infection. States she is having \"redness and itching on her vagina.\" reports symptoms began approximately 2 days ago. Denies any greenish or yellowish tinge color to discharge. States she is having white chunky discharge. States does not notice an odor. Denies any new sexual partners. No pelvic pain or vaginal bleeding. Is currently breastfeeding. Denies any changes in hygiene products. Has not tried any relieve OTC therapies.     Patient's PMR from outside medical facility reviewed and noted.    Review of Systems   Constitutional: Negative for activity change, fatigue and unexpected weight change.   HENT: Negative for congestion, mouth sores and trouble swallowing.    Eyes: Negative for discharge and visual disturbance.   Respiratory: Negative for cough and shortness of breath.    Cardiovascular: Negative for chest pain and leg swelling.   Gastrointestinal: Negative for abdominal pain, constipation, diarrhea and nausea.   Genitourinary: Positive for vaginal discharge. Negative for decreased urine volume, difficulty urinating and hematuria.        \"vaginal itching\"   Musculoskeletal: Negative for back pain and gait problem.   Skin: Negative for color change and rash.   Allergic/Immunologic: Negative for environmental allergies and immunocompromised state.   Neurological: Negative for weakness and headaches.   Psychiatric/Behavioral: Negative for confusion and sleep disturbance.        Otherwise complete ROS reviewed and negative except as mentioned in the HPI.    Past Medical History:   Past Medical History:   Diagnosis Date   • Spon  w/o complication      Past Surgical History:  Past Surgical History:   Procedure Laterality Date   • WISDOM TOOTH EXTRACTION       Social History:  reports that she quit smoking " "about 21 months ago. Her smoking use included cigarettes. She smoked 0.50 packs per day. She has never used smokeless tobacco. She reports current alcohol use. She reports that she does not use drugs.    Family History: family history includes Breast cancer in her maternal aunt, maternal aunt, maternal grandmother, maternal great-grandmother, and paternal aunt; Cervical cancer in her maternal grandmother; No Known Problems in her brother, father, mother, paternal grandfather, paternal grandmother, and sister.      Allergies:  Allergies   Allergen Reactions   • Cephalexin Other (See Comments)     Passed out     Medications:  Prior to Admission medications    Not on File         Objective     Vital Signs: BP 99/59 (BP Location: Left arm, Patient Position: Sitting, Cuff Size: Adult)   Pulse 61   Temp 99.1 °F (37.3 °C) (Skin)   Resp 19   Ht 162.6 cm (64\")   Wt 57.1 kg (125 lb 12.8 oz)   SpO2 99%   BMI 21.59 kg/m²   Physical Exam  Constitutional:       General: She is not in acute distress.     Appearance: Normal appearance. She is not ill-appearing.   HENT:      Head: Normocephalic and atraumatic.      Right Ear: External ear normal.      Left Ear: External ear normal.      Nose: Nose normal.      Mouth/Throat:      Mouth: Mucous membranes are moist.      Pharynx: No posterior oropharyngeal erythema.   Eyes:      General: No scleral icterus.     Extraocular Movements: Extraocular movements intact.      Conjunctiva/sclera: Conjunctivae normal.      Pupils: Pupils are equal, round, and reactive to light.   Cardiovascular:      Rate and Rhythm: Normal rate and regular rhythm.      Pulses: Normal pulses.      Heart sounds: Normal heart sounds.   Pulmonary:      Effort: Pulmonary effort is normal. No respiratory distress.      Breath sounds: Normal breath sounds. No wheezing.   Abdominal:      General: Abdomen is flat. Bowel sounds are normal.      Palpations: Abdomen is soft.      Tenderness: There is no abdominal " tenderness. There is no right CVA tenderness or left CVA tenderness.   Musculoskeletal:         General: Normal range of motion.      Cervical back: Normal range of motion.      Right lower leg: No edema.      Left lower leg: No edema.   Skin:     General: Skin is warm and dry.      Findings: No erythema or rash.   Neurological:      General: No focal deficit present.      Mental Status: She is alert and oriented to person, place, and time. Mental status is at baseline.      Motor: No weakness.   Psychiatric:         Mood and Affect: Mood normal.         Behavior: Behavior normal.         Thought Content: Thought content normal.         Judgment: Judgment normal.         BMI is within normal parameters. No other follow-up for BMI required.      Results Reviewed:  Glucose   Date Value Ref Range Status   03/10/2022 74 65 - 99 mg/dL Final   04/16/2020 82 70 - 100 mg/dL Final     BUN   Date Value Ref Range Status   03/10/2022 12 6 - 20 mg/dL Final   04/16/2020 9 5 - 21 mg/dL Final     Creatinine   Date Value Ref Range Status   03/10/2022 0.70 0.57 - 1.00 mg/dL Final   04/16/2020 0.71 0.50 - 1.40 mg/dL Final     Sodium   Date Value Ref Range Status   03/10/2022 140 134 - 144 mmol/L Final   04/16/2020 141 135 - 145 mmol/L Final     Potassium   Date Value Ref Range Status   03/10/2022 4.1 3.5 - 5.2 mmol/L Final   04/16/2020 4.1 3.5 - 5.3 mmol/L Final     Chloride   Date Value Ref Range Status   03/10/2022 104 96 - 106 mmol/L Final   04/16/2020 100 98 - 110 mmol/L Final     CO2   Date Value Ref Range Status   04/16/2020 30.0 24.0 - 31.0 mmol/L Final     Total CO2   Date Value Ref Range Status   03/10/2022 21 20 - 29 mmol/L Final     Calcium   Date Value Ref Range Status   03/10/2022 8.7 8.7 - 10.2 mg/dL Final   04/16/2020 9.2 8.4 - 10.4 mg/dL Final     ALT (SGPT)   Date Value Ref Range Status   03/10/2022 11 0 - 32 IU/L Final   04/16/2020 19 0 - 35 U/L Final     AST (SGOT)   Date Value Ref Range Status   03/10/2022 12 0 -  40 IU/L Final   04/16/2020 21 7 - 45 U/L Final     WBC   Date Value Ref Range Status   03/10/2022 6.9 3.4 - 10.8 x10E3/uL Final     Hematocrit   Date Value Ref Range Status   03/10/2022 41.0 34.0 - 46.6 % Final   05/08/2021 28.1 (L) 34.0 - 46.6 % Final     Platelets   Date Value Ref Range Status   03/10/2022 279 150 - 450 x10E3/uL Final   05/08/2021 202 140 - 450 10*3/mm3 Final         Assessment / Plan     Assessment/Plan:  1. Vaginal yeast infection  - miconazole (Monistat 1 Combo Pack) 1200 & 2 MG & % kit; Insert 1 each into the vagina 1 (One) Time for 1 dose.  Dispense: 1 kit; Refill: 1  - NuSwab BV & Candida - Swab, Vagina    2. Vaginal discharge  - NuSwab BV & Candida - Swab, Vagina        Return if symptoms worsen or fail to improve. unless patient needs to be seen sooner or acute issues arise.      I have discussed the patient results/orders and and plan/recommendation with them at today's visit.      Frannie Penn, APRN   10/04/2022

## 2022-10-07 ENCOUNTER — TELEPHONE (OUTPATIENT)
Dept: FAMILY MEDICINE CLINIC | Facility: CLINIC | Age: 27
End: 2022-10-07

## 2022-10-07 DIAGNOSIS — B37.31 VAGINAL YEAST INFECTION: Primary | ICD-10-CM

## 2022-10-07 LAB
A VAGINAE DNA VAG QL NAA+PROBE: ABNORMAL SCORE
BVAB2 DNA VAG QL NAA+PROBE: ABNORMAL SCORE
C ALBICANS DNA VAG QL NAA+PROBE: POSITIVE
C GLABRATA DNA VAG QL NAA+PROBE: NEGATIVE
MEGA1 DNA VAG QL NAA+PROBE: ABNORMAL SCORE

## 2022-10-07 RX ORDER — MICONAZOLE NITRATE 1200MG-2%
1 KIT VAGINAL ONCE
Qty: 1 KIT | Refills: 2 | Status: SHIPPED | OUTPATIENT
Start: 2022-10-07 | End: 2022-10-07

## 2022-10-07 NOTE — TELEPHONE ENCOUNTER
Pt called back for lab results 10/5/2022- reviewed results with pt. No questions or concerns at this time. Notified medication has been sent to pharmacy and to follow up after completion if symptoms have not resolved. Pt verbalized understanding.

## 2022-10-13 ENCOUNTER — CLINICAL SUPPORT (OUTPATIENT)
Dept: GENETICS | Facility: HOSPITAL | Age: 27
End: 2022-10-13

## 2022-10-13 DIAGNOSIS — Z80.3 FAMILY HISTORY OF BREAST CANCER: Primary | ICD-10-CM

## 2022-10-13 DIAGNOSIS — Z80.41 FH: OVARIAN CANCER: ICD-10-CM

## 2022-10-13 NOTE — PROGRESS NOTES
Urbano Pineda, a 26-year-old female, was referred for genetic counseling due to a family history of cancer.  Genetic counseling was provided via telehealth. Ms. Pineda was 14 years old at menarche and 22 years old when she had her first child.  She is premenopausal and retains her uterus and ovaries.  She has not had any breast imaging in the past.  She was interested in discussing her risk for a hereditary cancer syndrome.   Ms. Pineda decided to pursue comprehensive genetic testing to evaluate her risk of cancer, therefore the CancerNext-Expanded panel was ordered through [email or web address] which analyzes 77 genes associated with an hereditary cancer risk. Blood sample was collected at [facility].  Results are expected in approximately 2-3 weeks.    PERTINENT FAMILY HISTORY: (See attached pedigree)   Mother:  Reported cervical cancer at age 17, ovarian cancer at age 24  Mat. Grandmother: Breast cancer, 69  Mat. Great-Aunt: Breast cancer, 40s  Mat. Great-Aunt: Breast cancer     Hodgkin’s lymphoma  Mat. Great-Uncle: Prostate cancer     Kidney cancer  Pat. Aunt:  Breast cancer, 44  Pat. Great-Aunt: Breast cancer, 52  Pat. Great-Uncle: Stomach cancer, 63      RISK ASSESSMENT:  Ms. Pineda’s family history raised the question of a hereditary cancer syndrome.  Ms. Pineda meets NCCN guidelines criteria for BRCA1/2 testing based on both her maternal and paternal family history.  When affected relatives are unavailable to pursue genetic testing, it is reasonable to offer to an unaffected individual.  Based on the presence of other clinically significant breast cancer related genes, the standard approach to hereditary cancer genetic testing at this time is via multi-gene panel, which evaluates for BRCA1/2 as well as several additional genes associated with a hereditary risk for breast cancer and other cancers. If genetic testing is negative, management should be guided by a family history-based risk assessment.    GENETIC  COUNSELING:  We reviewed the family history information in detail. Cases of cancer follow three general patterns: sporadic, familial, and hereditary.  While most cancer is sporadic, some cases appear to occur in family clusters.  These cases are said to be familial and account for 10-20% of cancer cases.  Familial cases may be due to a combination of shared genes and environmental factors among family members.  In even fewer cases (5-10%), the risk for cancer is inherited, and the genes responsible for the increased cancer risk are known.      Family histories typical of hereditary cancer syndromes usually include multiple first- and second-degree relatives diagnosed with cancer types that define a syndrome.  These cases tend to be diagnosed at younger-than-expected ages and can be bilateral or multifocal.  The cancer in these families follows an autosomal dominant inheritance pattern, which indicates the likely presence of a mutation in a cancer susceptibility gene.  Children and siblings of an individual believed to carry this mutation have a 50% chance of inheriting that mutation, thereby inheriting the increased risk to develop cancer.  These mutations can be passed down from the maternal or the paternal lineage.    Hereditary breast and ovarian cancer accounts for 5-10% of all cases of breast cancer and/or ovarian cancer.  A significant proportion (50%) of hereditary breast and ovarian cancer can be attributed to mutations in the BRCA1 and BRCA2 genes.  Mutations in these genes confer an increased risk for breast cancer, ovarian cancer, male breast cancer, prostate cancer, and pancreatic cancer.  Women with a BRCA1 or BRCA2 mutation have up to an 87% lifetime risk of breast cancer and up to a 44% risk of ovarian cancer.  There are other clinically significant breast cancer and ovarian cancer related genes, as well as other, more rare, hereditary cancer syndromes. Based on Ms. Pineda ’s family history and her  desire to get more information regarding her personal risks, testing was pursued through a multigene panel evaluating several other genes known to increase the risk for cancer.    GENETIC TESTING:  The risks, benefits, and limitations of genetic testing and implications for clinical management following testing were reviewed.  DNA test results can influence decisions regarding screening and prevention.  Genetic testing can have significant psychological implications for both individuals and families. Also discussed was the possibility of insurance discrimination based on genetic test results and the laws in place to prevent this, as well as the limitations of these laws.      We discussed panel testing, which would involve testing 77 genes associated with increased cancer risk. The implications of a positive or negative test result were discussed.  We also discussed the importance of testing on an affected relative. In general, a negative genetic test result is most informative if a mutation has first been established in an affected member of the family.  In cases where an affected individual is not available or interested in testing, it is appropriate to offer testing to an unaffected individual. We discussed the possibility that, in some cases, genetic test results may be ambiguous due to the identification of a genetic variant. These variants may or may not be associated with an increased cancer risk. With multigene panel testing, it is not uncommon for a variant of uncertain significance (VUS) to be identified.  If a VUS is identified, testing family members is typically not recommended and screening recommendations are made based on the family history.  The laboratories that perform genetic testing work to reclassify the VUS and send out an amended report if and when a VUS is reclassified.  The majority of variant findings are ultimately reclassified to a negative result. Given her family history, a negative  test result does not eliminate all cancer risk, although the risk would not be as high as it would with positive genetic testing.     PLAN: Genetic testing via the CancerNext-Expanded panel through [employer] was ordered.  We will contact MsGaurang Edwin with her results once they are received.     Bety Abel MS, [facility], [facility]  Licensed Certified Genetic Counselor

## 2022-11-01 ENCOUNTER — DOCUMENTATION (OUTPATIENT)
Dept: GENETICS | Facility: HOSPITAL | Age: 27
End: 2022-11-01

## 2022-11-01 NOTE — PROGRESS NOTES
Urbano Pineda, a 26-year-old female, was referred for genetic counseling due to a family history of cancer.  Genetic counseling was provided via telehealth. Ms. Pineda was 14 years old at menarche and 22 years old when she had her first child.  She is premenopausal and retains her uterus and ovaries.  She has not had any breast imaging in the past.  She was interested in discussing her risk for a hereditary cancer syndrome.   Ms. Pineda decided to pursue comprehensive genetic testing to evaluate her risk of cancer, therefore the CancerNext panel was ordered through [employer] which analyzes 36 genes associated with an increased cancer risk. The genes on this panel include APC, MOR, AXIN2, BARD1, BMPR1A, BRCA1, BRCA2, BRIP1, CDH1, CDK4, CDKN2A, CHEK2, DICER1, EPCAM, GREM1, HOXB13, MLH1, MRE11A, MSH2, MSH3, MSH6, MUTYH, NBN, NF1, NTHL1, PALB2, PMS2, POLD1, POLE, PTEN, RAD51C, RAD51D, RECQL, SMAD4, SMARCA4, STK11, and TP53. Genetic testing was negative by sequencing and rearrangement testing for known pathogenic/deleterious mutations in the 77 genes included on the CancerNext-Expanded panel.  While no known deleterious mutations were identified, variants of uncertain significance (VUS) were identified in the DICER1 and PMS2 genes.  VUSs are differences in DNA that may or may not affect the function of the gene.  It is common for a VUS to be identified through multigene panel testing, given the number of genes being evaluated and the presence of genetic variation in the population. The majority (estimated to be >90%) of VUS’s are eventually reclassified as benign variants. Management should not be altered based on the identification of a VUS.  These results were discussed with Ms. Pineda by telephone on 11/1/22.    PERTINENT FAMILY HISTORY: (See attached pedigree)   Mother:  Reported cervical cancer at age 17, ovarian cancer at age 24 (type of ovarian cancer not known)  Mat. Grandmother: Breast cancer, 69  Mat.  Great-Aunt: Breast cancer, 40s  Mat. Great-Aunt: Breast cancer     Hodgkin’s lymphoma  Mat. Great-Uncle: Prostate cancer     Kidney cancer  Pat. Aunt:  Breast cancer, 44  Pat. Great-Aunt: Breast cancer, 52  Pat. Great-Uncle: Stomach cancer, 63      RISK ASSESSMENT:  Ms. Pineda’s family history raised the question of a hereditary cancer syndrome.  Ms. Pineda meets NCCN guidelines criteria for BRCA1/2 testing based on both her maternal and paternal family history.  When affected relatives are unavailable to pursue genetic testing, it is reasonable to offer to an unaffected individual.  Based on the presence of other clinically significant breast cancer related genes, the standard approach to hereditary cancer genetic testing at this time is via multi-gene panel, which evaluates for BRCA1/2 as well as several additional genes associated with a hereditary risk for breast cancer and other cancers. If genetic testing is negative, management should be guided by a family history-based risk assessment.    At this time, Ms. Pineda’s management should be guided by a family history-based risk assessment. Tyrer-UniServityzick, version 8 is able to take into account personal factors (age at menarche, age at first birth, etc) and family history when calculating risk for breast cancer.  Computer modeling estimates that Ms. Pineda’s lifetime personal risk for developing breast cancer is 15.5% (Luis Eer-Radhazick, v8), in comparison to the average woman’s risk of 12.5%. Per NCCN guidelines, a lifetime risk above 20% is considered to be “high risk” where increased screening is warranted; Ms. Pineda’s risk does not fall into that category. This risk assessment is based on the family history information provided at the time of the appointment and could change in the future should new information be obtained.    GENETIC COUNSELING:  We reviewed the family history information in detail. Cases of cancer follow three general patterns: sporadic, familial,  and hereditary.  While most cancer is sporadic, some cases appear to occur in family clusters.  These cases are said to be familial and account for 10-20% of cancer cases.  Familial cases may be due to a combination of shared genes and environmental factors among family members.  In even fewer cases (5-10%), the risk for cancer is inherited, and the genes responsible for the increased cancer risk are known.      Family histories typical of hereditary cancer syndromes usually include multiple first- and second-degree relatives diagnosed with cancer types that define a syndrome.  These cases tend to be diagnosed at younger-than-expected ages and can be bilateral or multifocal.  The cancer in these families follows an autosomal dominant inheritance pattern, which indicates the likely presence of a mutation in a cancer susceptibility gene.  Children and siblings of an individual believed to carry this mutation have a 50% chance of inheriting that mutation, thereby inheriting the increased risk to develop cancer.  These mutations can be passed down from the maternal or the paternal lineage.    Hereditary breast and ovarian cancer accounts for 5-10% of all cases of breast cancer and/or ovarian cancer.  A significant proportion (50%) of hereditary breast and ovarian cancer can be attributed to mutations in the BRCA1 and BRCA2 genes.  Mutations in these genes confer an increased risk for breast cancer, ovarian cancer, male breast cancer, prostate cancer, and pancreatic cancer.  Women with a BRCA1 or BRCA2 mutation have up to an 87% lifetime risk of breast cancer and up to a 44% risk of ovarian cancer.  There are other clinically significant breast cancer and ovarian cancer related genes, as well as other, more rare, hereditary cancer syndromes. Based on Ms. Pineda ’s family history and her desire to get more information regarding her personal risks, testing was pursued through a multigene panel evaluating several other  genes known to increase the risk for cancer.    GENETIC TESTING:  The risks, benefits, and limitations of genetic testing and implications for clinical management following testing were reviewed.  DNA test results can influence decisions regarding screening and prevention.  Genetic testing can have significant psychological implications for both individuals and families. Also discussed was the possibility of insurance discrimination based on genetic test results and the laws in place to prevent this, as well as the limitations of these laws.      We discussed panel testing, which would involve testing 36 genes associated with increased cancer risk. The implications of a positive or negative test result were discussed.  We also discussed the importance of testing on an affected relative. In general, a negative genetic test result is most informative if a mutation has first been established in an affected member of the family.  In cases where an affected individual is not available or interested in testing, it is appropriate to offer testing to an unaffected individual. We discussed the possibility that, in some cases, genetic test results may be ambiguous due to the identification of a genetic variant. These variants may or may not be associated with an increased cancer risk. With multigene panel testing, it is not uncommon for a variant of uncertain significance (VUS) to be identified.  If a VUS is identified, testing family members is typically not recommended and screening recommendations are made based on the family history.  The laboratories that perform genetic testing work to reclassify the VUS and send out an amended report if and when a VUS is reclassified.  The majority of variant findings are ultimately reclassified to a negative result. Given her family history, a negative test result does not eliminate all cancer risk, although the risk would not be as high as it would with positive genetic testing.      TEST RESULTS:  Genetic testing was negative for known pathogenic mutations by sequencing and rearrangement testing for the BRCA1/2 genes and additional 75 genes on the CancerNext-Expanded panel.  Variants of uncertain significance (VUS) was identified in the DICER1 and PMS2 genes, however the majority (>90%) of VUS’s are ultimately reclassified as benign, therefore this result is not clinically actionable and should not be used in making management decisions. It is common for a VUS to be identified through multigene panel testing, given the number of genes being evaluated and the presence of genetic variation in the population. If the variant is ever reclassified, a new report will be issued by the laboratory and released directly to the ordering physician, and our office and Ms. Pineda will be contacted by us at that time. It is possible that the family history could be due to a mutation that Ms. Pineda did not inherit, and therefore other relatives may want to consider genetic testing based on the family history.  This assessment is based on the information provided at the time of the consultation.     CANCER PREVENTION:  Options available to individuals with an elevated lifetime risk for breast and/or ovarian cancer were briefly discussed.  Based on computer modeling, Ms. Pineda’s lifetime risk for breast cancer would not be considered “high risk”. General population screening guidelines include annual clinical breast exam, annual mammography, and self-breast exams. This assessment is based on the information provided at the time of the consultation. This assessment is based on the information provided at the time of the consultation.      PLAN:  Genetic counseling remains available to Ms. Pineda. If Ms. Pineda has any questions, concerns, or updates to the family history, she is welcome to contact us at [phone].    Bety Abel MS, [facility], [facility]  Licensed Certified Genetic Counselor    Cc: Yesica Levine,  MD Urbano Pineda

## 2022-11-15 ENCOUNTER — TELEPHONE (OUTPATIENT)
Dept: GENERAL RADIOLOGY | Facility: CLINIC | Age: 27
End: 2022-11-15

## 2022-11-15 DIAGNOSIS — Z01.84 IMMUNITY STATUS TESTING: Primary | ICD-10-CM

## 2022-11-15 NOTE — TELEPHONE ENCOUNTER
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 She needs order for Rubella and Varicella Titers. Spoke to Eliza and she said this was okay.

## 2022-11-16 ENCOUNTER — LAB (OUTPATIENT)
Dept: INTERNAL MEDICINE | Facility: CLINIC | Age: 27
End: 2022-11-16
Payer: COMMERCIAL

## 2022-11-16 DIAGNOSIS — Z01.84 IMMUNITY STATUS TESTING: ICD-10-CM

## 2022-11-17 LAB
RUBV IGG SERPL IA-ACNC: 1.39 INDEX
VZV IGG SER IA-ACNC: 1284 INDEX

## 2023-01-09 ENCOUNTER — OFFICE VISIT (OUTPATIENT)
Dept: INTERNAL MEDICINE | Facility: CLINIC | Age: 28
End: 2023-01-09
Payer: COMMERCIAL

## 2023-01-09 VITALS
DIASTOLIC BLOOD PRESSURE: 73 MMHG | BODY MASS INDEX: 23.39 KG/M2 | SYSTOLIC BLOOD PRESSURE: 111 MMHG | HEIGHT: 64 IN | TEMPERATURE: 98.7 F | HEART RATE: 84 BPM | WEIGHT: 137 LBS | OXYGEN SATURATION: 98 %

## 2023-01-09 DIAGNOSIS — Z23 ENCOUNTER FOR IMMUNIZATION: Primary | ICD-10-CM

## 2023-01-09 PROCEDURE — 90686 IIV4 VACC NO PRSV 0.5 ML IM: CPT | Performed by: NURSE PRACTITIONER

## 2023-01-09 PROCEDURE — 90471 IMMUNIZATION ADMIN: CPT | Performed by: NURSE PRACTITIONER

## 2023-01-10 NOTE — PROGRESS NOTES
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  According to previous laboratory data, patient is varicella immune.  Her immune level was greater than 1000.  We did go ahead and print this off for her today and we did discuss go ahead and receiving an influenza injection today and she understood      Review of Systems   Otherwise complete ROS reviewed and negative except as mentioned in the HPI.    Past Medical History:   Past Medical History:   Diagnosis Date   • Spon  w/o complication      Past Surgical History:  Past Surgical History:   Procedure Laterality Date   • WISDOM TOOTH EXTRACTION       Social History:  reports that she quit smoking about 2 years ago. Her smoking use included cigarettes. She has a 1.00 pack-year smoking history. She has never used smokeless tobacco. She reports current alcohol use. She reports that she does not use drugs.    Family History: family history includes Breast cancer in her maternal aunt, maternal aunt, maternal grandmother, maternal great-grandmother, and paternal aunt; Cervical cancer in her maternal grandmother; No Known Problems in her brother, father, mother, paternal grandfather, paternal grandmother, and sister.      Allergies:  Allergies   Allergen Reactions   • Cephalexin Other (See Comments)     Passed out     Medications:  Prior to Admission medications    Not on File       Objective     Vital Signs: /73 (BP Location: Left arm, Patient Position: Sitting, Cuff Size: Adult)   Pulse 84   Temp 98.7 °F (37.1 °C) (Infrared)   Ht 162.6 cm (64\")   Wt 62.1 kg (137 lb)   SpO2 98%   BMI 23.52 kg/m²   Physical  Exam  Vitals reviewed.   Constitutional:       Appearance: She is well-developed.   HENT:      Head: Normocephalic and atraumatic.   Eyes:      Pupils: Pupils are equal, round, and reactive to light.   Neck:      Vascular: No JVD.   Cardiovascular:      Rate and Rhythm: Normal rate.   Pulmonary:      Effort: Pulmonary effort is normal.   Musculoskeletal:         General: No deformity.      Cervical back: Normal range of motion and neck supple.   Lymphadenopathy:      Cervical: No cervical adenopathy.   Skin:     General: Skin is warm and dry.   Neurological:      Mental Status: She is alert and oriented to person, place, and time.   Psychiatric:         Behavior: Behavior normal.         Thought Content: Thought content normal.         Judgment: Judgment normal.         BMI is within normal parameters. No other follow-up for BMI required.      Results Reviewed:  Glucose   Date Value Ref Range Status   03/10/2022 74 65 - 99 mg/dL Final   04/16/2020 82 70 - 100 mg/dL Final     BUN   Date Value Ref Range Status   03/10/2022 12 6 - 20 mg/dL Final   04/16/2020 9 5 - 21 mg/dL Final     Creatinine   Date Value Ref Range Status   03/10/2022 0.70 0.57 - 1.00 mg/dL Final   04/16/2020 0.71 0.50 - 1.40 mg/dL Final     Sodium   Date Value Ref Range Status   03/10/2022 140 134 - 144 mmol/L Final   04/16/2020 141 135 - 145 mmol/L Final     Potassium   Date Value Ref Range Status   03/10/2022 4.1 3.5 - 5.2 mmol/L Final   04/16/2020 4.1 3.5 - 5.3 mmol/L Final     Chloride   Date Value Ref Range Status   03/10/2022 104 96 - 106 mmol/L Final   04/16/2020 100 98 - 110 mmol/L Final     CO2   Date Value Ref Range Status   04/16/2020 30.0 24.0 - 31.0 mmol/L Final     Total CO2   Date Value Ref Range Status   03/10/2022 21 20 - 29 mmol/L Final     Calcium   Date Value Ref Range Status   03/10/2022 8.7 8.7 - 10.2 mg/dL Final   04/16/2020 9.2 8.4 - 10.4 mg/dL Final     ALT (SGPT)   Date Value Ref Range Status   03/10/2022 11 0 - 32 IU/L  Final   04/16/2020 19 0 - 35 U/L Final     AST (SGOT)   Date Value Ref Range Status   03/10/2022 12 0 - 40 IU/L Final   04/16/2020 21 7 - 45 U/L Final     WBC   Date Value Ref Range Status   03/10/2022 6.9 3.4 - 10.8 x10E3/uL Final     Hematocrit   Date Value Ref Range Status   03/10/2022 41.0 34.0 - 46.6 % Final   05/08/2021 28.1 (L) 34.0 - 46.6 % Final     Platelets   Date Value Ref Range Status   03/10/2022 279 150 - 450 x10E3/uL Final   05/08/2021 202 140 - 450 10*3/mm3 Final         Assessment / Plan     Assessment/Plan:  Diagnoses and all orders for this visit:    1. Encounter for immunization (Primary)  -     FluLaval/Fluzone >6 mos ([id number])    We did print off her documentation of immunity so she can take it to class with her. She will let us know if she needs to obtain an additional vaccination.       No follow-ups on file. unless patient needs to be seen sooner or acute issues arise.      I have discussed the patient results/orders and and plan/recommendation with them at today's visit.      Clare Gabriel, APRN   01/09/2023

## 2023-03-17 ENCOUNTER — OFFICE VISIT (OUTPATIENT)
Dept: INTERNAL MEDICINE | Facility: CLINIC | Age: 28
End: 2023-03-17
Payer: COMMERCIAL

## 2023-03-17 VITALS
DIASTOLIC BLOOD PRESSURE: 68 MMHG | HEIGHT: 64 IN | HEART RATE: 100 BPM | WEIGHT: 140 LBS | OXYGEN SATURATION: 99 % | BODY MASS INDEX: 23.9 KG/M2 | RESPIRATION RATE: 18 BRPM | TEMPERATURE: 98.6 F | SYSTOLIC BLOOD PRESSURE: 103 MMHG

## 2023-03-17 DIAGNOSIS — H10.022 PINK EYE DISEASE OF LEFT EYE: Primary | ICD-10-CM

## 2023-03-17 PROCEDURE — 1159F MED LIST DOCD IN RCRD: CPT | Performed by: NURSE PRACTITIONER

## 2023-03-17 PROCEDURE — 99213 OFFICE O/P EST LOW 20 MIN: CPT | Performed by: NURSE PRACTITIONER

## 2023-03-17 PROCEDURE — 1160F RVW MEDS BY RX/DR IN RCRD: CPT | Performed by: NURSE PRACTITIONER

## 2023-03-17 RX ORDER — TOBRAMYCIN 3 MG/ML
2 SOLUTION/ DROPS OPHTHALMIC
Qty: 5 ML | Refills: 1 | Status: SHIPPED | OUTPATIENT
Start: 2023-03-17

## 2023-03-17 NOTE — PROGRESS NOTES
"        Subjective     Chief Complaint   Patient presents with   • Eye Problem     Red & Swollen       History of Present Illness    The patient consents to [facility] recording.    ROSA LOPEZ is a 27-year-old female who presents today for conjunctivitis of the left eye.    Ms. Lopez reports she first noticed conjunctivitis of the left eye 2 nights ago. She felt as if she had something in her eye, so she tried to remove it. However, she now has a headache. Her son had strep throat.    She is not currently breast feeding.       Patient's PMR from outside medical facility reviewed and noted.    Review of Systems     Otherwise complete ROS reviewed and negative except as mentioned in the HPI.    Past Medical History:   Past Medical History:   Diagnosis Date   • Spon  w/o complication      Past Surgical History:  Past Surgical History:   Procedure Laterality Date   • WISDOM TOOTH EXTRACTION       Social History:  reports that she quit smoking about 2 years ago. Her smoking use included cigarettes. She has a 1.00 pack-year smoking history. She has never used smokeless tobacco. She reports current alcohol use. She reports that she does not use drugs.    Family History: family history includes Breast cancer in her maternal aunt, maternal aunt, maternal grandmother, maternal great-grandmother, and paternal aunt; Cervical cancer in her maternal grandmother; No Known Problems in her brother, father, mother, paternal grandfather, paternal grandmother, and sister.      Allergies:  Allergies   Allergen Reactions   • Cephalexin Other (See Comments)     Passed out     Medications:  Prior to Admission medications    Not on File       Objective     Vital Signs: /68   Pulse 100   Temp 98.6 °F (37 °C)   Resp 18   Ht 162.6 cm (64\")   Wt 63.5 kg (140 lb)   SpO2 99%   Breastfeeding No   BMI 24.03 kg/m²   Physical Exam  Vitals reviewed.   Constitutional:       Appearance: She is well-developed.   HENT:      Head: " Normocephalic and atraumatic.   Eyes:      General:         Left eye: Discharge present.     Pupils: Pupils are equal, round, and reactive to light.      Comments: Edematous and reddened.    Neck:      Vascular: No JVD.   Cardiovascular:      Rate and Rhythm: Normal rate and regular rhythm.   Pulmonary:      Effort: Pulmonary effort is normal.   Abdominal:      General: Bowel sounds are normal.      Palpations: Abdomen is soft.   Musculoskeletal:         General: No deformity.      Cervical back: Normal range of motion and neck supple.   Lymphadenopathy:      Cervical: No cervical adenopathy.   Skin:     General: Skin is warm and dry.   Neurological:      Mental Status: She is alert and oriented to person, place, and time.   Psychiatric:         Behavior: Behavior normal.         Thought Content: Thought content normal.         Judgment: Judgment normal.         BMI is within normal parameters. No other follow-up for BMI required.      Results Reviewed:  Glucose   Date Value Ref Range Status   03/10/2022 74 65 - 99 mg/dL Final   04/16/2020 82 70 - 100 mg/dL Final     BUN   Date Value Ref Range Status   03/10/2022 12 6 - 20 mg/dL Final   04/16/2020 9 5 - 21 mg/dL Final     Creatinine   Date Value Ref Range Status   03/10/2022 0.70 0.57 - 1.00 mg/dL Final   04/16/2020 0.71 0.50 - 1.40 mg/dL Final     Sodium   Date Value Ref Range Status   03/10/2022 140 134 - 144 mmol/L Final   04/16/2020 141 135 - 145 mmol/L Final     Potassium   Date Value Ref Range Status   03/10/2022 4.1 3.5 - 5.2 mmol/L Final   04/16/2020 4.1 3.5 - 5.3 mmol/L Final     Chloride   Date Value Ref Range Status   03/10/2022 104 96 - 106 mmol/L Final   04/16/2020 100 98 - 110 mmol/L Final     CO2   Date Value Ref Range Status   04/16/2020 30.0 24.0 - 31.0 mmol/L Final     Total CO2   Date Value Ref Range Status   03/10/2022 21 20 - 29 mmol/L Final     Calcium   Date Value Ref Range Status   03/10/2022 8.7 8.7 - 10.2 mg/dL Final   04/16/2020 9.2 8.4 -  10.4 mg/dL Final     ALT (SGPT)   Date Value Ref Range Status   03/10/2022 11 0 - 32 IU/L Final   04/16/2020 19 0 - 35 U/L Final     AST (SGOT)   Date Value Ref Range Status   03/10/2022 12 0 - 40 IU/L Final   04/16/2020 21 7 - 45 U/L Final     WBC   Date Value Ref Range Status   03/10/2022 6.9 3.4 - 10.8 x10E3/uL Final     Hematocrit   Date Value Ref Range Status   03/10/2022 41.0 34.0 - 46.6 % Final   05/08/2021 28.1 (L) 34.0 - 46.6 % Final     Platelets   Date Value Ref Range Status   03/10/2022 279 150 - 450 x10E3/uL Final   05/08/2021 202 140 - 450 10*3/mm3 Final         Assessment / Plan     Assessment/Plan:  Diagnoses and all orders for this visit:    1. Pink eye disease of left eye (Primary)  Comments:  The patient will be prescribed Tobramycin eye drops.   She will contact the office if her symptoms do not improve.    Orders:  -     tobramycin (Tobrex) 0.3 % solution ophthalmic solution; Administer 2 drops into the left eye Every 4 (Four) Hours While Awake.  Dispense: 5 mL; Refill: 1      No follow-ups on file. unless patient needs to be seen sooner or acute issues arise.    Code Status: Full.     I have discussed the patient results/orders and and plan/recommendation with them at today's visit.      AYALA Majano   03/17/2023    Transcribed from ambient dictation for AYALA Majano by Tori Welch.  03/17/23   11:41 CDT    Patient or patient representative verbalized consent to the visit recording.  I have personally performed the services described in this document as transcribed by the above individual, and it is both accurate and complete.  AYALA Majano  3/17/2023  17:03 CDT

## 2023-05-23 ENCOUNTER — OFFICE VISIT (OUTPATIENT)
Dept: INTERNAL MEDICINE | Facility: CLINIC | Age: 28
End: 2023-05-23
Payer: COMMERCIAL

## 2023-05-23 VITALS
WEIGHT: 146.2 LBS | TEMPERATURE: 98 F | DIASTOLIC BLOOD PRESSURE: 67 MMHG | RESPIRATION RATE: 17 BRPM | SYSTOLIC BLOOD PRESSURE: 109 MMHG | HEART RATE: 73 BPM | HEIGHT: 64 IN | BODY MASS INDEX: 24.96 KG/M2 | OXYGEN SATURATION: 100 %

## 2023-05-23 DIAGNOSIS — F41.9 ANXIETY: Primary | ICD-10-CM

## 2023-05-23 DIAGNOSIS — F33.2 SEVERE EPISODE OF RECURRENT MAJOR DEPRESSIVE DISORDER, WITHOUT PSYCHOTIC FEATURES: ICD-10-CM

## 2023-05-23 PROCEDURE — 1160F RVW MEDS BY RX/DR IN RCRD: CPT

## 2023-05-23 PROCEDURE — 99213 OFFICE O/P EST LOW 20 MIN: CPT

## 2023-05-23 PROCEDURE — 1159F MED LIST DOCD IN RCRD: CPT

## 2023-05-23 RX ORDER — HYDROXYZINE HYDROCHLORIDE 10 MG/1
10 TABLET, FILM COATED ORAL 3 TIMES DAILY PRN
Qty: 90 TABLET | Refills: 0 | Status: SHIPPED | OUTPATIENT
Start: 2023-05-23

## 2023-05-23 RX ORDER — ESCITALOPRAM OXALATE 5 MG/1
5 TABLET ORAL DAILY
Qty: 14 TABLET | Refills: 0 | Status: SHIPPED | OUTPATIENT
Start: 2023-05-23

## 2023-05-23 NOTE — PROGRESS NOTES
"        Subjective     Chief Complaint   Patient presents with    Anxiety     Discuss medication.     Depression       Anxiety  Presents for initial visit. Onset was more than 5 years ago. The problem has been gradually worsening. Symptoms include decreased concentration, depressed mood, nervous/anxious behavior and panic. Patient reports no chest pain, confusion, nausea, shortness of breath or suicidal ideas. Symptoms occur most days. The severity of symptoms is moderate. The symptoms are aggravated by work stress and social activities. The patient sleeps 10 hours per night. The quality of sleep is good. Nighttime awakenings: occasional.     Her past medical history is significant for depression. Treatments tried: buspar. The treatment provided no relief. Compliance with prior treatments has been good.   Depression  Visit Type: initial  Onset of symptoms: more than 5 years ago  Progression since onset: rapidly worsening  Patient presents with the following symptoms: decreased concentration, depressed mood, nervousness/anxiety and panic.  Patient is not experiencing: confusion, shortness of breath, suicidal ideas and suicidal planning.  Frequency of symptoms: most days   Severity: severe   Aggravated by: work stress and social activities  Sleep per night: 12 hours  Sleep quality: good  Risk factors: family history  Patient has a history of: mental illness  Treatment tried: SSRI    Patient presents today with complaints of anxiety and depression. States she has been on medication in the past, was on Buspar for anxiety, made her sleepy,and was on Prozac and does not feel that it helped. Patient denies any chance of being pregnant.   Admits that she thinks that she has ever had 2 panic attacks since December 2022. Denies any difficulty sleeping, states she could sleep \"all the time.\" Currently works 2 jobs.     Patient's PMR from outside medical facility reviewed and noted.    Review of Systems   Constitutional:  " Negative for activity change, fatigue and unexpected weight change.   HENT:  Negative for mouth sores and trouble swallowing.    Eyes:  Negative for discharge and visual disturbance.   Respiratory:  Negative for cough and shortness of breath.    Cardiovascular:  Negative for chest pain and leg swelling.   Gastrointestinal:  Negative for abdominal pain, constipation, diarrhea and nausea.   Genitourinary:  Negative for decreased urine volume, difficulty urinating and hematuria.   Musculoskeletal:  Negative for back pain and gait problem.   Skin:  Negative for color change and rash.   Allergic/Immunologic: Negative for environmental allergies and immunocompromised state.   Neurological:  Negative for weakness and headaches.   Psychiatric/Behavioral:  Positive for decreased concentration. Negative for confusion, sleep disturbance and suicidal ideas. The patient is nervous/anxious.       Otherwise complete ROS reviewed and negative except as mentioned in the HPI.    Past Medical History:   Past Medical History:   Diagnosis Date    Spon  w/o complication      Past Surgical History:  Past Surgical History:   Procedure Laterality Date    WISDOM TOOTH EXTRACTION       Social History:  reports that she quit smoking about 2 years ago. Her smoking use included cigarettes. She has a 1.00 pack-year smoking history. She has never used smokeless tobacco. She reports current alcohol use. She reports that she does not use drugs.    Family History: family history includes Breast cancer in her maternal aunt, maternal aunt, maternal grandmother, maternal great-grandmother, and paternal aunt; Cervical cancer in her maternal grandmother; No Known Problems in her brother, father, mother, paternal grandfather, paternal grandmother, and sister.      Allergies:  Allergies   Allergen Reactions    Cephalexin Other (See Comments)     Passed out     Medications:  Prior to Admission medications    Medication Sig Start Date End Date Taking?  Authorizing Provider   tobramycin (Tobrex) 0.3 % solution ophthalmic solution Administer 2 drops into the left eye Every 4 (Four) Hours While Awake.  Patient not taking: Reported on 5/23/2023 3/17/23   Clare Gabriel APRN       SANJUANA: Over the last two weeks, how often have you been bothered by the following problems?  Feeling nervous, anxious or on edge: Nearly every day  Not being able to stop or control worrying: Nearly every day  Worrying too much about different things: Nearly every day  Trouble Relaxing: Nearly every day  Being so restless that it is hard to sit still: Not at all  Becoming easily annoyed or irritable: Nearly every day  Feeling afraid as if something awful might happen: Several days  SANJUANA 7 Total Score: 16  If you checked any problems, how difficult have these problems made it for you to do your work, take care of things at home, or get along with other people: Very difficult      PHQ-9 Depression Screening  Little interest or pleasure in doing things? 3-->nearly every day   Feeling down, depressed, or hopeless? 3-->nearly every day   Trouble falling or staying asleep, or sleeping too much? 3-->nearly every day   Feeling tired or having little energy? 3-->nearly every day   Poor appetite or overeating? 3-->nearly every day   Feeling bad about yourself - or that you are a failure or have let yourself or your family down? 3-->nearly every day   Trouble concentrating on things, such as reading the newspaper or watching television? 3-->nearly every day   Moving or speaking so slowly that other people could have noticed? Or the opposite - being so fidgety or restless that you have been moving around a lot more than usual? 0-->not at all   Thoughts that you would be better off dead, or of hurting yourself in some way? 0-->not at all   PHQ-9 Total Score 21   If you checked off any problems, how difficult have these problems made it for you to do your work, take care of things at home, or get  "along with other people? very difficult       PHQ-9 Total Score: 21   20-27 (Severe Depression)  Support given, observe for worsening symptoms, Recommended starting psychotherapy/counseling, and Discussed antidepressant therapy    Objective     Vital Signs: /67 (BP Location: Right arm, Patient Position: Sitting, Cuff Size: Adult)   Pulse 73   Temp 98 °F (36.7 °C) (Skin)   Resp 17   Ht 162.6 cm (64\")   Wt 66.3 kg (146 lb 3.2 oz)   SpO2 100%   BMI 25.10 kg/m²   Physical Exam  Vitals and nursing note reviewed.   Constitutional:       General: She is not in acute distress.     Appearance: Normal appearance. She is not ill-appearing.   HENT:      Head: Normocephalic and atraumatic.      Right Ear: External ear normal.      Left Ear: External ear normal.      Nose: Nose normal.      Mouth/Throat:      Mouth: Mucous membranes are moist.      Pharynx: No posterior oropharyngeal erythema.   Eyes:      General: No scleral icterus.     Extraocular Movements: Extraocular movements intact.      Conjunctiva/sclera: Conjunctivae normal.      Pupils: Pupils are equal, round, and reactive to light.   Cardiovascular:      Rate and Rhythm: Normal rate and regular rhythm.      Pulses: Normal pulses.      Heart sounds: Normal heart sounds.   Pulmonary:      Effort: Pulmonary effort is normal. No respiratory distress.      Breath sounds: Normal breath sounds. No wheezing.   Abdominal:      General: Abdomen is flat. Bowel sounds are normal.      Palpations: Abdomen is soft.      Tenderness: There is no abdominal tenderness.   Musculoskeletal:         General: Normal range of motion.      Cervical back: Normal range of motion.      Right lower leg: No edema.      Left lower leg: No edema.   Skin:     General: Skin is warm and dry.      Findings: No erythema or rash.   Neurological:      General: No focal deficit present.      Mental Status: She is alert and oriented to person, place, and time. Mental status is at baseline.     "  Motor: No weakness.   Psychiatric:         Mood and Affect: Mood normal.         Behavior: Behavior normal.         Thought Content: Thought content normal.         Judgment: Judgment normal.         Results Reviewed:  Glucose   Date Value Ref Range Status   03/10/2022 74 65 - 99 mg/dL Final   04/16/2020 82 70 - 100 mg/dL Final     BUN   Date Value Ref Range Status   03/10/2022 12 6 - 20 mg/dL Final   04/16/2020 9 5 - 21 mg/dL Final     Creatinine   Date Value Ref Range Status   03/10/2022 0.70 0.57 - 1.00 mg/dL Final   04/16/2020 0.71 0.50 - 1.40 mg/dL Final     Sodium   Date Value Ref Range Status   03/10/2022 140 134 - 144 mmol/L Final   04/16/2020 141 135 - 145 mmol/L Final     Potassium   Date Value Ref Range Status   03/10/2022 4.1 3.5 - 5.2 mmol/L Final   04/16/2020 4.1 3.5 - 5.3 mmol/L Final     Chloride   Date Value Ref Range Status   03/10/2022 104 96 - 106 mmol/L Final   04/16/2020 100 98 - 110 mmol/L Final     CO2   Date Value Ref Range Status   04/16/2020 30.0 24.0 - 31.0 mmol/L Final     Total CO2   Date Value Ref Range Status   03/10/2022 21 20 - 29 mmol/L Final     Calcium   Date Value Ref Range Status   03/10/2022 8.7 8.7 - 10.2 mg/dL Final   04/16/2020 9.2 8.4 - 10.4 mg/dL Final     ALT (SGPT)   Date Value Ref Range Status   03/10/2022 11 0 - 32 IU/L Final   04/16/2020 19 0 - 35 U/L Final     AST (SGOT)   Date Value Ref Range Status   03/10/2022 12 0 - 40 IU/L Final   04/16/2020 21 7 - 45 U/L Final     WBC   Date Value Ref Range Status   03/10/2022 6.9 3.4 - 10.8 x10E3/uL Final     Hematocrit   Date Value Ref Range Status   03/10/2022 41.0 34.0 - 46.6 % Final   05/08/2021 28.1 (L) 34.0 - 46.6 % Final     Platelets   Date Value Ref Range Status   03/10/2022 279 150 - 450 x10E3/uL Final   05/08/2021 202 140 - 450 10*3/mm3 Final         Assessment / Plan     Assessment/Plan:  1. Anxiety  - hydrOXYzine (ATARAX) 10 MG tablet; Take 1 tablet by mouth 3 (Three) Times a Day As Needed for Anxiety.   Dispense: 90 tablet; Refill: 0    2. Severe episode of recurrent major depressive disorder, without psychotic features  - escitalopram (Lexapro) 5 MG tablet; Take 1 tablet by mouth Daily.  Dispense: 14 tablet; Refill: 0      Return in about 2 weeks (around 6/6/2023) for Recheck. unless patient needs to be seen sooner or acute issues arise.      I have discussed the patient results/orders and and plan/recommendation with them at today's visit.      Frannie Penn, APRN   05/23/2023

## 2023-06-06 ENCOUNTER — OFFICE VISIT (OUTPATIENT)
Dept: INTERNAL MEDICINE | Facility: CLINIC | Age: 28
End: 2023-06-06
Payer: COMMERCIAL

## 2023-06-06 VITALS
TEMPERATURE: 98 F | DIASTOLIC BLOOD PRESSURE: 64 MMHG | SYSTOLIC BLOOD PRESSURE: 95 MMHG | OXYGEN SATURATION: 99 % | RESPIRATION RATE: 18 BRPM | WEIGHT: 146 LBS | BODY MASS INDEX: 24.92 KG/M2 | HEART RATE: 69 BPM | HEIGHT: 64 IN

## 2023-06-06 DIAGNOSIS — F33.1 MAJOR DEPRESSIVE DISORDER, RECURRENT, MODERATE: ICD-10-CM

## 2023-06-06 DIAGNOSIS — F41.9 ANXIETY: Primary | ICD-10-CM

## 2023-06-06 DIAGNOSIS — E55.9 VITAMIN D DEFICIENCY: ICD-10-CM

## 2023-06-06 DIAGNOSIS — Z00.00 ROUTINE GENERAL MEDICAL EXAMINATION AT A HEALTH CARE FACILITY: ICD-10-CM

## 2023-06-06 RX ORDER — ESCITALOPRAM OXALATE 10 MG/1
10 TABLET ORAL DAILY
Qty: 30 TABLET | Refills: 1 | Status: SHIPPED | OUTPATIENT
Start: 2023-06-06

## 2023-06-06 NOTE — PROGRESS NOTES
Subjective     Chief Complaint   Patient presents with    Anxiety       Anxiety  Symptoms include nervous/anxious behavior. Patient reports no confusion, decreased concentration or suicidal ideas.     Pt in office today for 2 week f/u on lexapro 5mg and Atarax 10mg. Pt has not noticed any change in mood or stress/anxiety. Pt denies missing any doses of Lexapro, pt has been taking Atarax one time daily, most days. Pt reports feeling angry and fatigued. Pt does not feel that reactions to stressful situations are reasonable. Pt has tried Prozac in the past, this did not help. Pt has also tried buspar in the past, but this caused drowsiness.    Review of Systems   Constitutional:  Positive for fatigue. Negative for activity change, appetite change and fever.   Psychiatric/Behavioral:  Positive for agitation. Negative for behavioral problems, confusion, decreased concentration, dysphoric mood, hallucinations, self-injury, sleep disturbance and suicidal ideas. The patient is nervous/anxious. The patient is not hyperactive.       Otherwise complete ROS reviewed and negative except as mentioned in the HPI.    Past Medical History:   Past Medical History:   Diagnosis Date    Spon  w/o complication      Past Surgical History:  Past Surgical History:   Procedure Laterality Date    WISDOM TOOTH EXTRACTION       Social History:  reports that she quit smoking about 2 years ago. Her smoking use included cigarettes. She has a 1.00 pack-year smoking history. She has never used smokeless tobacco. She reports current alcohol use. She reports that she does not use drugs.    Family History: family history includes Breast cancer in her maternal aunt, maternal aunt, maternal grandmother, maternal great-grandmother, and paternal aunt; Cervical cancer in her maternal grandmother; No Known Problems in her brother, father, mother, paternal grandfather, paternal grandmother, and sister.       Allergies:  Allergies   Allergen  "Reactions    Cephalexin Other (See Comments)     Passed out     Medications:  Prior to Admission medications    Medication Sig Start Date End Date Taking? Authorizing Provider   escitalopram (Lexapro) 5 MG tablet Take 1 tablet by mouth Daily. 5/23/23   Frannie Penn APRN   hydrOXYzine (ATARAX) 10 MG tablet Take 1 tablet by mouth 3 (Three) Times a Day As Needed for Anxiety. 5/23/23   Frannie Penn APRN       Objective     Vital Signs: BP 95/64   Pulse 69   Temp 98 °F (36.7 °C)   Resp 18   Ht 162.6 cm (64\")   Wt 66.2 kg (146 lb)   SpO2 99%   BMI 25.06 kg/m²   Physical Exam  Vitals reviewed.   Constitutional:       Appearance: She is well-developed.   HENT:      Head: Normocephalic and atraumatic.   Eyes:      Pupils: Pupils are equal, round, and reactive to light.   Neck:      Vascular: No JVD.   Cardiovascular:      Rate and Rhythm: Normal rate and regular rhythm.   Pulmonary:      Effort: Pulmonary effort is normal.      Breath sounds: Normal breath sounds.   Abdominal:      General: Bowel sounds are normal.      Palpations: Abdomen is soft.   Musculoskeletal:         General: No deformity.      Cervical back: Normal range of motion and neck supple.   Lymphadenopathy:      Cervical: No cervical adenopathy.   Skin:     General: Skin is warm and dry.   Neurological:      Mental Status: She is alert and oriented to person, place, and time.   Psychiatric:         Behavior: Behavior normal.         Thought Content: Thought content normal.         Judgment: Judgment normal.       BMI is >= 25 and <30. (Overweight) The following options were offered after discussion;: exercise counseling/recommendations and nutrition counseling/recommendations      Results Reviewed:  Glucose   Date Value Ref Range Status   03/10/2022 74 65 - 99 mg/dL Final   04/16/2020 82 70 - 100 mg/dL Final     BUN   Date Value Ref Range Status   03/10/2022 12 6 - 20 mg/dL Final   04/16/2020 9 5 - 21 mg/dL Final     Creatinine   Date Value " Ref Range Status   03/10/2022 0.70 0.57 - 1.00 mg/dL Final   04/16/2020 0.71 0.50 - 1.40 mg/dL Final     Sodium   Date Value Ref Range Status   03/10/2022 140 134 - 144 mmol/L Final   04/16/2020 141 135 - 145 mmol/L Final     Potassium   Date Value Ref Range Status   03/10/2022 4.1 3.5 - 5.2 mmol/L Final   04/16/2020 4.1 3.5 - 5.3 mmol/L Final     Chloride   Date Value Ref Range Status   03/10/2022 104 96 - 106 mmol/L Final   04/16/2020 100 98 - 110 mmol/L Final     CO2   Date Value Ref Range Status   04/16/2020 30.0 24.0 - 31.0 mmol/L Final     Total CO2   Date Value Ref Range Status   03/10/2022 21 20 - 29 mmol/L Final     Calcium   Date Value Ref Range Status   03/10/2022 8.7 8.7 - 10.2 mg/dL Final   04/16/2020 9.2 8.4 - 10.4 mg/dL Final     ALT (SGPT)   Date Value Ref Range Status   03/10/2022 11 0 - 32 IU/L Final   04/16/2020 19 0 - 35 U/L Final     AST (SGOT)   Date Value Ref Range Status   03/10/2022 12 0 - 40 IU/L Final   04/16/2020 21 7 - 45 U/L Final     WBC   Date Value Ref Range Status   03/10/2022 6.9 3.4 - 10.8 x10E3/uL Final     Hematocrit   Date Value Ref Range Status   03/10/2022 41.0 34.0 - 46.6 % Final   05/08/2021 28.1 (L) 34.0 - 46.6 % Final     Platelets   Date Value Ref Range Status   03/10/2022 279 150 - 450 x10E3/uL Final   05/08/2021 202 140 - 450 10*3/mm3 Final         Assessment / Plan     Assessment/Plan:  Diagnoses and all orders for this visit:    1. Anxiety (Primary)  -     escitalopram (Lexapro) 10 MG tablet; Take 1 tablet by mouth Daily.  Dispense: 30 tablet; Refill: 1  -     [facility] - Swab,    2. Major depressive disorder, recurrent, moderate  -     escitalopram (Lexapro) 10 MG tablet; Take 1 tablet by mouth Daily.  Dispense: 30 tablet; Refill: 1  -     [facility] - Swab,    3. Vitamin D deficiency  -     Vitamin D,25-Hydroxy    4. Routine general medical examination at a health care facility  -     CBC & Differential  -     Comprehensive Metabolic Panel  -     Lipid Panel  -      TSH  -     Vitamin B12  -     Hemoglobin A1c  -     T4, Free  -     T3, free          Return in 6 weeks (on 7/18/2023) for Annual physical & 1 month. unless patient needs to be seen sooner or acute issues arise.    Code Status: full    I have discussed the patient results/orders and and plan/recommendation with them at today's visit.      Clare Gabriel, AYALA   06/06/2023

## 2023-06-07 LAB
25(OH)D3+25(OH)D2 SERPL-MCNC: 18.6 NG/ML (ref 30–100)
ALBUMIN SERPL-MCNC: 4.5 G/DL (ref 3.9–5)
ALBUMIN/GLOB SERPL: 2 {RATIO} (ref 1.2–2.2)
ALP SERPL-CCNC: 67 IU/L (ref 44–121)
ALT SERPL-CCNC: 11 IU/L (ref 0–32)
AST SERPL-CCNC: 17 IU/L (ref 0–40)
BASOPHILS # BLD AUTO: 0 X10E3/UL (ref 0–0.2)
BASOPHILS NFR BLD AUTO: 1 %
BILIRUB SERPL-MCNC: 0.4 MG/DL (ref 0–1.2)
BUN SERPL-MCNC: 13 MG/DL (ref 6–20)
BUN/CREAT SERPL: 17 (ref 9–23)
CALCIUM SERPL-MCNC: 9.1 MG/DL (ref 8.7–10.2)
CHLORIDE SERPL-SCNC: 105 MMOL/L (ref 96–106)
CHOLEST SERPL-MCNC: 151 MG/DL (ref 100–199)
CO2 SERPL-SCNC: 21 MMOL/L (ref 20–29)
CREAT SERPL-MCNC: 0.76 MG/DL (ref 0.57–1)
EGFRCR SERPLBLD CKD-EPI 2021: 111 ML/MIN/1.73
EOSINOPHIL # BLD AUTO: 0.1 X10E3/UL (ref 0–0.4)
EOSINOPHIL NFR BLD AUTO: 2 %
ERYTHROCYTE [DISTWIDTH] IN BLOOD BY AUTOMATED COUNT: 12.5 % (ref 11.7–15.4)
GLOBULIN SER CALC-MCNC: 2.3 G/DL (ref 1.5–4.5)
GLUCOSE SERPL-MCNC: 78 MG/DL (ref 70–99)
HBA1C MFR BLD: 4.9 % (ref 4.8–5.6)
HCT VFR BLD AUTO: 44.2 % (ref 34–46.6)
HDLC SERPL-MCNC: 43 MG/DL
HGB BLD-MCNC: 14.7 G/DL (ref 11.1–15.9)
IMM GRANULOCYTES # BLD AUTO: 0 X10E3/UL (ref 0–0.1)
IMM GRANULOCYTES NFR BLD AUTO: 0 %
LDLC SERPL CALC-MCNC: 95 MG/DL (ref 0–99)
LYMPHOCYTES # BLD AUTO: 1.7 X10E3/UL (ref 0.7–3.1)
LYMPHOCYTES NFR BLD AUTO: 36 %
MCH RBC QN AUTO: 28.9 PG (ref 26.6–33)
MCHC RBC AUTO-ENTMCNC: 33.3 G/DL (ref 31.5–35.7)
MCV RBC AUTO: 87 FL (ref 79–97)
MONOCYTES # BLD AUTO: 0.3 X10E3/UL (ref 0.1–0.9)
MONOCYTES NFR BLD AUTO: 7 %
NEUTROPHILS # BLD AUTO: 2.6 X10E3/UL (ref 1.4–7)
NEUTROPHILS NFR BLD AUTO: 54 %
PLATELET # BLD AUTO: 244 X10E3/UL (ref 150–450)
POTASSIUM SERPL-SCNC: 4.1 MMOL/L (ref 3.5–5.2)
PROT SERPL-MCNC: 6.8 G/DL (ref 6–8.5)
RBC # BLD AUTO: 5.09 X10E6/UL (ref 3.77–5.28)
SODIUM SERPL-SCNC: 141 MMOL/L (ref 134–144)
T3FREE SERPL-MCNC: 4.1 PG/ML (ref 2–4.4)
T4 FREE SERPL-MCNC: 1.29 NG/DL (ref 0.82–1.77)
TRIGL SERPL-MCNC: 63 MG/DL (ref 0–149)
TSH SERPL DL<=0.005 MIU/L-ACNC: 1.23 UIU/ML (ref 0.45–4.5)
VIT B12 SERPL-MCNC: 546 PG/ML (ref 232–1245)
VLDLC SERPL CALC-MCNC: 13 MG/DL (ref 5–40)
WBC # BLD AUTO: 4.7 X10E3/UL (ref 3.4–10.8)

## 2023-06-08 RX ORDER — ERGOCALCIFEROL 1.25 MG/1
50000 CAPSULE ORAL 2 TIMES WEEKLY
Qty: 9 CAPSULE | Refills: 3 | Status: SHIPPED | OUTPATIENT
Start: 2023-06-08

## 2023-06-15 RX ORDER — VENLAFAXINE HYDROCHLORIDE 75 MG/1
75 CAPSULE, EXTENDED RELEASE ORAL DAILY
Qty: 30 CAPSULE | Refills: 1 | Status: SHIPPED | OUTPATIENT
Start: 2023-06-15

## 2023-07-25 RX ORDER — BUPROPION HYDROCHLORIDE 300 MG/1
300 TABLET ORAL DAILY
Qty: 90 TABLET | Refills: 1 | Status: SHIPPED | OUTPATIENT
Start: 2023-07-25

## 2023-07-27 DIAGNOSIS — F41.9 ANXIETY: ICD-10-CM

## 2023-07-27 DIAGNOSIS — F41.9 ANXIETY: Primary | ICD-10-CM

## 2023-07-27 RX ORDER — AZITHROMYCIN 500 MG/1
1000 TABLET, FILM COATED ORAL ONCE
Qty: 2 TABLET | Refills: 0 | OUTPATIENT
Start: 2023-07-27 | End: 2023-07-27

## 2023-07-27 RX ORDER — AZITHROMYCIN 250 MG/1
TABLET, FILM COATED ORAL
Qty: 6 TABLET | Refills: 0 | Status: SHIPPED | OUTPATIENT
Start: 2023-07-27

## 2023-07-27 NOTE — TELEPHONE ENCOUNTER
Per patients mychart message c/o still having the bleeding after taking antibiotic, Sandra noted to send in a zpack.  Pt will return in four weeks for a recheck and discuss possible other birth control options.    Please check that I put the order in correctly to sign off on

## 2023-08-09 RX ORDER — VENLAFAXINE HYDROCHLORIDE 75 MG/1
75 CAPSULE, EXTENDED RELEASE ORAL DAILY
Qty: 60 CAPSULE | Refills: 0 | OUTPATIENT
Start: 2023-08-09

## 2023-08-09 NOTE — TELEPHONE ENCOUNTER
Rx Refill Note  Requested Prescriptions     Refused Prescriptions Disp Refills    venlafaxine XR (EFFEXOR-XR) 75 MG 24 hr capsule [Pharmacy Med Name: Venlafaxine HCl ER 75 MG Oral Capsule Extended Release 24 Hour] 60 capsule 0     Sig: Take 1 capsule by mouth once daily      Last office visit with prescribing clinician: 7/11/2023   Last telemedicine visit with prescribing clinician: Visit date not found   Next office visit with prescribing clinician: Visit date not found       MEDICATION DISCONTINUED AND ALTERNATIVE MEDICATION PRESCRIBED 07/17/2023.                      Would you like a call back once the refill request has been completed: [] Yes [] No    If the office needs to give you a call back, can they leave a voicemail: [] Yes [] No    Lakesha Olivares MA  08/09/23, 13:59 CDT

## 2023-09-20 ENCOUNTER — OFFICE VISIT (OUTPATIENT)
Dept: INTERNAL MEDICINE | Facility: CLINIC | Age: 28
End: 2023-09-20
Payer: COMMERCIAL

## 2023-09-20 VITALS
HEIGHT: 64 IN | RESPIRATION RATE: 17 BRPM | HEART RATE: 87 BPM | BODY MASS INDEX: 25.81 KG/M2 | OXYGEN SATURATION: 99 % | TEMPERATURE: 97.5 F | WEIGHT: 151.2 LBS | SYSTOLIC BLOOD PRESSURE: 98 MMHG | DIASTOLIC BLOOD PRESSURE: 64 MMHG

## 2023-09-20 DIAGNOSIS — N63.0 BREAST LUMP IN UPPER INNER QUADRANT: Primary | ICD-10-CM

## 2023-09-20 PROCEDURE — 1159F MED LIST DOCD IN RCRD: CPT

## 2023-09-20 PROCEDURE — 99213 OFFICE O/P EST LOW 20 MIN: CPT

## 2023-09-20 PROCEDURE — 1160F RVW MEDS BY RX/DR IN RCRD: CPT

## 2023-09-20 NOTE — PROGRESS NOTES
Subjective     Chief Complaint   Patient presents with    Breast Mass     Left side. Noticed a little over one week ago. Initially sore, soreness has subsided.        History of Present Illness     Patient presents today with a knot she found on her Left breast 1 week ago.The knot was painful at first, but has not noticed any pain in the last few days. Denies any nipple discharge or dimpling. She had an IUD placed 2021 and does not have menstrual cycles. Denies any breast pain.      Patient's PMR from outside medical facility reviewed and noted.    Review of Systems   Constitutional:  Negative for activity change, fatigue and unexpected weight change.   HENT:  Negative for mouth sores and trouble swallowing.    Eyes:  Negative for discharge and visual disturbance.   Respiratory:  Negative for cough and shortness of breath.    Cardiovascular:  Negative for chest pain and leg swelling.   Gastrointestinal:  Negative for abdominal pain, constipation, diarrhea and nausea.   Genitourinary:  Negative for decreased urine volume, difficulty urinating and hematuria.   Musculoskeletal:  Negative for back pain and gait problem.   Skin:  Negative for color change and rash.   Allergic/Immunologic: Negative for environmental allergies and immunocompromised state.   Neurological:  Negative for weakness and headaches.   Psychiatric/Behavioral:  Negative for confusion and sleep disturbance.       Otherwise complete ROS reviewed and negative except as mentioned in the HPI.    Past Medical History:   Past Medical History:   Diagnosis Date    Spon  w/o complication      Past Surgical History:  Past Surgical History:   Procedure Laterality Date    WISDOM TOOTH EXTRACTION       Social History:  reports that she quit smoking about 2 years ago. Her smoking use included cigarettes. She has a 1.00 pack-year smoking history. She has never used smokeless tobacco. She reports current alcohol use. She reports that she does  "not use drugs.    Family History: family history includes Breast cancer in her maternal aunt, maternal aunt, maternal grandmother, maternal great-grandmother, and paternal aunt; Cervical cancer in her maternal grandmother; No Known Problems in her brother, father, mother, paternal grandfather, paternal grandmother, and sister.      Allergies:  Allergies   Allergen Reactions    Cephalexin Other (See Comments)     Passed out     Medications:  Prior to Admission medications    Medication Sig Start Date End Date Taking? Authorizing Provider   buPROPion XL (Wellbutrin XL) 300 MG 24 hr tablet Take 1 tablet by mouth Daily. 7/25/23  Yes Clare Gabriel APRN   escitalopram (Lexapro) 20 MG tablet Take 1 tablet by mouth Daily. 7/17/23  Yes Clare Gabriel APRN   Levonorgestrel (MIRENA, 52 MG, IU) by Intrauterine route.   Yes Provider, MD Wilver   vitamin D (ERGOCALCIFEROL) 1.25 MG (84495 UT) capsule capsule Take 1 capsule by mouth 2 (Two) Times a Week. 6/8/23  Yes Clare Gabriel APRN   azithromycin (Zithromax Z-Ambrose) 250 MG tablet Take 2 tablets (500 mg) on  Day 1,  followed by 1 tablet (250 mg) once daily on Days 2 through 5.  Patient not taking: Reported on 9/20/2023 7/27/23   Sandra Hill APRN   doxycycline (MONODOX) 100 MG capsule Take 1 capsule by mouth 2 (Two) Times a Day.  Patient not taking: Reported on 9/20/2023 7/18/23   Sandra Hill APRN   hydrOXYzine (ATARAX) 10 MG tablet Take 1 tablet by mouth 3 (Three) Times a Day As Needed for Anxiety.  Patient not taking: Reported on 9/20/2023 5/23/23   Frannie Penn APRN       Objective     Vital Signs: BP 98/64 (BP Location: Right arm, Patient Position: Sitting, Cuff Size: Adult)   Pulse 87   Temp 97.5 °F (36.4 °C) (Skin)   Resp 17   Ht 162.6 cm (64\")   Wt 68.6 kg (151 lb 3.2 oz)   SpO2 99%   BMI 25.95 kg/m²   Physical Exam  Vitals and nursing note reviewed.   Constitutional:       General: She is not in acute " distress.     Appearance: Normal appearance. She is not ill-appearing.   HENT:      Head: Normocephalic and atraumatic.      Nose: Nose normal.      Mouth/Throat:      Mouth: Mucous membranes are moist.      Pharynx: No posterior oropharyngeal erythema.   Eyes:      General: No scleral icterus.     Extraocular Movements: Extraocular movements intact.      Conjunctiva/sclera: Conjunctivae normal.      Pupils: Pupils are equal, round, and reactive to light.   Cardiovascular:      Rate and Rhythm: Normal rate and regular rhythm.      Pulses: Normal pulses.      Heart sounds: Normal heart sounds.   Pulmonary:      Effort: Pulmonary effort is normal. No respiratory distress.      Breath sounds: Normal breath sounds. No wheezing.   Chest:      Chest wall: No mass.   Breasts:     Right: Normal. No inverted nipple, mass, nipple discharge or skin change.      Left: Normal. Mass present. No inverted nipple, nipple discharge or skin change.       Abdominal:      General: Abdomen is flat. Bowel sounds are normal.      Palpations: Abdomen is soft.      Tenderness: There is no abdominal tenderness.   Musculoskeletal:         General: Normal range of motion.      Cervical back: Normal range of motion.      Right lower leg: No edema.      Left lower leg: No edema.   Lymphadenopathy:      Upper Body:      Right upper body: No axillary or pectoral adenopathy.      Left upper body: No axillary or pectoral adenopathy.   Skin:     General: Skin is warm and dry.      Findings: No erythema or rash.   Neurological:      General: No focal deficit present.      Mental Status: She is alert and oriented to person, place, and time. Mental status is at baseline.      Motor: No weakness.   Psychiatric:         Mood and Affect: Mood normal.         Behavior: Behavior normal.         Thought Content: Thought content normal.         Judgment: Judgment normal.              Results Reviewed:  Glucose   Date Value Ref Range Status   06/06/2023 78 70 -  99 mg/dL Final   04/16/2020 82 70 - 100 mg/dL Final     BUN   Date Value Ref Range Status   06/06/2023 13 6 - 20 mg/dL Final   04/16/2020 9 5 - 21 mg/dL Final     Creatinine   Date Value Ref Range Status   06/06/2023 0.76 0.57 - 1.00 mg/dL Final   04/16/2020 0.71 0.50 - 1.40 mg/dL Final     Sodium   Date Value Ref Range Status   06/06/2023 141 134 - 144 mmol/L Final   04/16/2020 141 135 - 145 mmol/L Final     Potassium   Date Value Ref Range Status   06/06/2023 4.1 3.5 - 5.2 mmol/L Final   04/16/2020 4.1 3.5 - 5.3 mmol/L Final     Chloride   Date Value Ref Range Status   06/06/2023 105 96 - 106 mmol/L Final   04/16/2020 100 98 - 110 mmol/L Final     CO2   Date Value Ref Range Status   04/16/2020 30.0 24.0 - 31.0 mmol/L Final     Total CO2   Date Value Ref Range Status   06/06/2023 21 20 - 29 mmol/L Final     Calcium   Date Value Ref Range Status   06/06/2023 9.1 8.7 - 10.2 mg/dL Final   04/16/2020 9.2 8.4 - 10.4 mg/dL Final     ALT (SGPT)   Date Value Ref Range Status   06/06/2023 11 0 - 32 IU/L Final   04/16/2020 19 0 - 35 U/L Final     AST (SGOT)   Date Value Ref Range Status   06/06/2023 17 0 - 40 IU/L Final   04/16/2020 21 7 - 45 U/L Final     WBC   Date Value Ref Range Status   06/06/2023 4.7 3.4 - 10.8 x10E3/uL Final     Hematocrit   Date Value Ref Range Status   06/06/2023 44.2 34.0 - 46.6 % Final   05/08/2021 28.1 (L) 34.0 - 46.6 % Final     Platelets   Date Value Ref Range Status   06/06/2023 244 150 - 450 x10E3/uL Final   05/08/2021 202 140 - 450 10*3/mm3 Final     Triglycerides   Date Value Ref Range Status   06/06/2023 63 0 - 149 mg/dL Final     HDL Cholesterol   Date Value Ref Range Status   06/06/2023 43 >39 mg/dL Final     LDL Chol Calc ([facility])   Date Value Ref Range Status   06/06/2023 95 0 - 99 mg/dL Final     Hemoglobin A1C   Date Value Ref Range Status   06/06/2023 4.9 4.8 - 5.6 % Final     Comment:              Prediabetes: 5.7 - 6.4           Diabetes: >6.4           Glycemic control for adults  with diabetes: <7.0           Assessment / Plan     Assessment/Plan:  1. Breast lump in left in upper inner quadrant  -US breast  -diagnostic mammogram\       Return if symptoms worsen or fail to improve. unless patient needs to be seen sooner or acute issues arise.      I have discussed the patient results/orders and and plan/recommendation with them at today's visit.      Frannie Penn, APRN   09/20/2023

## 2023-09-26 DIAGNOSIS — N63.21 MASS OF UPPER OUTER QUADRANT OF LEFT BREAST: ICD-10-CM

## 2023-09-26 DIAGNOSIS — N63.0 BREAST LUMP IN UPPER INNER QUADRANT: Primary | ICD-10-CM

## 2023-09-28 ENCOUNTER — OFFICE VISIT (OUTPATIENT)
Dept: INTERNAL MEDICINE | Facility: CLINIC | Age: 28
End: 2023-09-28
Payer: COMMERCIAL

## 2023-09-28 VITALS
OXYGEN SATURATION: 99 % | RESPIRATION RATE: 18 BRPM | SYSTOLIC BLOOD PRESSURE: 94 MMHG | TEMPERATURE: 98 F | DIASTOLIC BLOOD PRESSURE: 62 MMHG | BODY MASS INDEX: 25.64 KG/M2 | HEIGHT: 64 IN | HEART RATE: 76 BPM | WEIGHT: 150.2 LBS

## 2023-09-28 DIAGNOSIS — E66.3 OVERWEIGHT (BMI 25.0-29.9): Primary | ICD-10-CM

## 2023-09-28 RX ORDER — PHENTERMINE HYDROCHLORIDE 37.5 MG/1
37.5 TABLET ORAL
Qty: 30 TABLET | Refills: 0 | Status: SHIPPED | OUTPATIENT
Start: 2023-09-28

## 2023-09-28 NOTE — PROGRESS NOTES
Subjective     Chief Complaint   Patient presents with    Weight Gain       History of Present Illness  Patient reports gaining 30 pounds in the last year. Patient reports starting Lexapro and Wellbutrin. Patient began Lexapro in July and states weight gain was occurring before beginning this medication. Patient denies eating anymore or eating any different. Patient states she has cut back and eats smaller portions, not as often, and drinks more water since noticing the weight gain.   Patient states Anxiety and Depression are better on current medication regimen.  Patient denies current smoking status.       Review of Systems   Constitutional:  Positive for unexpected weight change.   HENT: Negative.     Eyes: Negative.    Respiratory: Negative.     Cardiovascular: Negative.    Gastrointestinal: Negative.    Endocrine: Negative.    Genitourinary: Negative.    Musculoskeletal: Negative.    Skin: Negative.    Allergic/Immunologic: Negative.    Neurological: Negative.    Hematological: Negative.    Psychiatric/Behavioral: Negative.        Otherwise complete ROS reviewed and negative except as mentioned in the HPI.    Past Medical History:   Past Medical History:   Diagnosis Date    Spon  w/o complication      Past Surgical History:  Past Surgical History:   Procedure Laterality Date    WISDOM TOOTH EXTRACTION       Social History:  reports that she quit smoking about 2 years ago. Her smoking use included cigarettes. She has a 1.00 pack-year smoking history. She has never used smokeless tobacco. She reports current alcohol use. She reports that she does not use drugs.    Family History: family history includes Breast cancer in her maternal aunt, maternal aunt, maternal grandmother, maternal great-grandmother, and paternal aunt; Cervical cancer in her maternal grandmother; No Known Problems in her brother, father, mother, paternal grandfather, paternal grandmother, and sister.       Allergies:  Allergies  "  Allergen Reactions    Cephalexin Other (See Comments)     Passed out     Medications:  Prior to Admission medications    Medication Sig Start Date End Date Taking? Authorizing Provider   buPROPion XL (Wellbutrin XL) 300 MG 24 hr tablet Take 1 tablet by mouth Daily. 7/25/23   Clare Gabriel APRN   escitalopram (Lexapro) 20 MG tablet Take 1 tablet by mouth Daily. 7/17/23   Clare Gabriel APRN   Levonorgestrel (MIRENA, 52 MG, IU) by Intrauterine route.    Provider, MD Wilver   vitamin D (ERGOCALCIFEROL) 1.25 MG (76674 UT) capsule capsule Take 1 capsule by mouth 2 (Two) Times a Week. 6/8/23   Clare Gabriel APRN       Objective     Vital Signs: BP 94/62   Pulse 76   Temp 98 °F (36.7 °C)   Resp 18   Ht 162.6 cm (64\")   Wt 68.1 kg (150 lb 3.2 oz)   SpO2 99%   BMI 25.78 kg/m²   Physical Exam  Vitals reviewed.   Constitutional:       Appearance: Normal appearance. She is well-developed.   HENT:      Head: Normocephalic and atraumatic.   Eyes:      Pupils: Pupils are equal, round, and reactive to light.   Neck:      Vascular: No JVD.   Cardiovascular:      Rate and Rhythm: Normal rate and regular rhythm.   Pulmonary:      Effort: Pulmonary effort is normal.   Abdominal:      General: Bowel sounds are normal.      Palpations: Abdomen is soft.   Musculoskeletal:         General: No deformity.      Cervical back: Normal range of motion and neck supple.   Lymphadenopathy:      Cervical: No cervical adenopathy.   Skin:     General: Skin is warm and dry.   Neurological:      Mental Status: She is alert and oriented to person, place, and time.   Psychiatric:         Behavior: Behavior normal.         Thought Content: Thought content normal.         Judgment: Judgment normal.     esults Reviewed:  Glucose   Date Value Ref Range Status   06/06/2023 78 70 - 99 mg/dL Final   04/16/2020 82 70 - 100 mg/dL Final     BUN   Date Value Ref Range Status   06/06/2023 13 6 - 20 mg/dL Final "   04/16/2020 9 5 - 21 mg/dL Final     Creatinine   Date Value Ref Range Status   06/06/2023 0.76 0.57 - 1.00 mg/dL Final   04/16/2020 0.71 0.50 - 1.40 mg/dL Final     Sodium   Date Value Ref Range Status   06/06/2023 141 134 - 144 mmol/L Final   04/16/2020 141 135 - 145 mmol/L Final     Potassium   Date Value Ref Range Status   06/06/2023 4.1 3.5 - 5.2 mmol/L Final   04/16/2020 4.1 3.5 - 5.3 mmol/L Final     Chloride   Date Value Ref Range Status   06/06/2023 105 96 - 106 mmol/L Final   04/16/2020 100 98 - 110 mmol/L Final     CO2   Date Value Ref Range Status   04/16/2020 30.0 24.0 - 31.0 mmol/L Final     Total CO2   Date Value Ref Range Status   06/06/2023 21 20 - 29 mmol/L Final     Calcium   Date Value Ref Range Status   06/06/2023 9.1 8.7 - 10.2 mg/dL Final   04/16/2020 9.2 8.4 - 10.4 mg/dL Final     ALT (SGPT)   Date Value Ref Range Status   06/06/2023 11 0 - 32 IU/L Final   04/16/2020 19 0 - 35 U/L Final     AST (SGOT)   Date Value Ref Range Status   06/06/2023 17 0 - 40 IU/L Final   04/16/2020 21 7 - 45 U/L Final     WBC   Date Value Ref Range Status   06/06/2023 4.7 3.4 - 10.8 x10E3/uL Final     Hematocrit   Date Value Ref Range Status   06/06/2023 44.2 34.0 - 46.6 % Final   05/08/2021 28.1 (L) 34.0 - 46.6 % Final     Platelets   Date Value Ref Range Status   06/06/2023 244 150 - 450 x10E3/uL Final   05/08/2021 202 140 - 450 10*3/mm3 Final     Triglycerides   Date Value Ref Range Status   06/06/2023 63 0 - 149 mg/dL Final     HDL Cholesterol   Date Value Ref Range Status   06/06/2023 43 >39 mg/dL Final     LDL Chol Calc ([facility])   Date Value Ref Range Status   06/06/2023 95 0 - 99 mg/dL Final     Hemoglobin A1C   Date Value Ref Range Status   06/06/2023 4.9 4.8 - 5.6 % Final     Comment:              Prediabetes: 5.7 - 6.4           Diabetes: >6.4           Glycemic control for adults with diabetes: <7.0           Assessment / Plan     Assessment/Plan:  Diagnoses and all orders for this visit:    1. Overweight  (BMI 25.0-29.9) (Primary)  -     ECG 12 Lead  -     phentermine (ADIPEX-P) 37.5 MG tablet; Take 1 tablet by mouth Every Morning Before Breakfast.  Dispense: 30 tablet; Refill: 0        ECG 12 Lead    Date/Time: 9/28/2023 5:05 PM  Performed by: Clare Gabriel APRN  Authorized by: Clare Gabriel APRN   Comparison: not compared with previous ECG   Previous ECG: no previous ECG available  Rhythm: sinus rhythm  Rate: normal  ST Segments: ST segments normal  T Waves: T waves normal  QRS axis: normal    Clinical impression: normal ECG        No follow-ups on file. unless patient needs to be seen sooner or acute issues arise.    Code Status: full    I have discussed the patient results/orders and and plan/recommendation with them at today's visit.      AYALA Majano   09/28/2023

## 2023-10-16 ENCOUNTER — TELEPHONE (OUTPATIENT)
Dept: INTERNAL MEDICINE | Facility: CLINIC | Age: 28
End: 2023-10-16
Payer: COMMERCIAL

## 2023-10-16 RX ORDER — BUPROPION HYDROCHLORIDE 300 MG/1
300 TABLET ORAL DAILY
Qty: 90 TABLET | Refills: 1 | Status: SHIPPED | OUTPATIENT
Start: 2023-10-16

## 2023-10-16 RX ORDER — ESCITALOPRAM OXALATE 20 MG/1
20 TABLET ORAL DAILY
Qty: 90 TABLET | Refills: 1 | Status: SHIPPED | OUTPATIENT
Start: 2023-10-16

## 2023-10-16 NOTE — TELEPHONE ENCOUNTER
Caller: Edwin Urbano K    Relationship: Self    Best call back number: [phone]     Requested Prescriptions:   Requested Prescriptions     Pending Prescriptions Disp Refills    buPROPion XL (Wellbutrin XL) 300 MG 24 hr tablet 90 tablet 1     Sig: Take 1 tablet by mouth Daily.    escitalopram (Lexapro) 20 MG tablet 90 tablet 1     Sig: Take 1 tablet by mouth Daily.        Pharmacy where request should be sent: [address] [phone] [facility] [phone]      Last office visit with prescribing clinician: 9/28/2023   Last telemedicine visit with prescribing clinician: Visit date not found   Next office visit with prescribing clinician: 10/26/2023     Additional details provided by patient: HAS 3 DAYS LEFT    Does the patient have less than a 3 day supply:  [x] Yes  [] No    Would you like a call back once the refill request has been completed: [x] Yes [] No    If the office needs to give you a call back, can they leave a voicemail: [x] Yes [] No    Richy Lopez Rep   10/16/23 15:31 CDT

## 2023-10-26 ENCOUNTER — OFFICE VISIT (OUTPATIENT)
Dept: INTERNAL MEDICINE | Facility: CLINIC | Age: 28
End: 2023-10-26
Payer: COMMERCIAL

## 2023-10-26 VITALS
RESPIRATION RATE: 16 BRPM | TEMPERATURE: 98.6 F | WEIGHT: 146 LBS | HEIGHT: 64 IN | DIASTOLIC BLOOD PRESSURE: 65 MMHG | HEART RATE: 91 BPM | OXYGEN SATURATION: 99 % | BODY MASS INDEX: 24.92 KG/M2 | SYSTOLIC BLOOD PRESSURE: 102 MMHG

## 2023-10-26 DIAGNOSIS — E66.3 OVERWEIGHT (BMI 25.0-29.9): Primary | ICD-10-CM

## 2023-10-26 RX ORDER — PHENTERMINE HYDROCHLORIDE 37.5 MG/1
37.5 TABLET ORAL
Qty: 30 TABLET | Refills: 0 | Status: SHIPPED | OUTPATIENT
Start: 2023-10-26 | End: 2023-10-27 | Stop reason: SDUPTHER

## 2023-10-26 NOTE — PROGRESS NOTES
Subjective     Chief Complaint   Patient presents with    Obesity       Obesity    Pt comes in today to follow up for weight loss. She has been on phentermine for 1month. States she did feel like her appetite was less and it helped her focus. No palpitations. No chest pain. She has lost 4 pounds. She is watching some of what she eats. States overall she doesn't eat bad. Some mild constipation.     Review of Systems   Otherwise complete ROS reviewed and negative except as mentioned in the HPI.    Past Medical History:   Past Medical History:   Diagnosis Date    Spon  w/o complication      Past Surgical History:  Past Surgical History:   Procedure Laterality Date    WISDOM TOOTH EXTRACTION       Social History:  reports that she quit smoking about 2 years ago. Her smoking use included cigarettes. She has a 1.00 pack-year smoking history. She has never used smokeless tobacco. She reports current alcohol use. She reports that she does not use drugs.    Family History: family history includes Breast cancer in her maternal aunt, maternal aunt, maternal grandmother, maternal great-grandmother, and paternal aunt; Cervical cancer in her maternal grandmother; No Known Problems in her brother, father, mother, paternal grandfather, paternal grandmother, and sister.       Allergies:  Allergies   Allergen Reactions    Cephalexin Other (See Comments)     Passed out     Medications:  Prior to Admission medications    Medication Sig Start Date End Date Taking? Authorizing Provider   buPROPion XL (Wellbutrin XL) 300 MG 24 hr tablet Take 1 tablet by mouth Daily. 10/16/23   Clare Gabriel APRN   escitalopram (Lexapro) 20 MG tablet Take 1 tablet by mouth Daily. 10/16/23   Clare Gabriel APRN   Levonorgestrel (MIRENA, 52 MG, IU) by Intrauterine route.    Provider, MD Wilver   phentermine (ADIPEX-P) 37.5 MG tablet Take 1 tablet by mouth Every Morning Before Breakfast. 23   Clare Gabriel  "AYALA Ritter   vitamin D (ERGOCALCIFEROL) 1.25 MG (13898 UT) capsule capsule Take 1 capsule by mouth 2 (Two) Times a Week. 6/8/23   Clare Gabriel APRN       Objective     Vital Signs: /65 (BP Location: Right arm, Patient Position: Sitting, Cuff Size: Adult)   Pulse 91   Temp 98.6 °F (37 °C) (Infrared)   Resp 16   Ht 162.6 cm (64.02\")   Wt 66.2 kg (146 lb)   SpO2 99%   BMI 25.05 kg/m²   Physical Exam  Vitals reviewed.   Constitutional:       Appearance: Normal appearance. She is well-developed.   HENT:      Head: Normocephalic and atraumatic.   Eyes:      Pupils: Pupils are equal, round, and reactive to light.   Neck:      Vascular: No JVD.   Cardiovascular:      Rate and Rhythm: Normal rate and regular rhythm.   Pulmonary:      Effort: Pulmonary effort is normal.      Breath sounds: Normal breath sounds.   Abdominal:      General: Bowel sounds are normal.      Palpations: Abdomen is soft.   Musculoskeletal:         General: No deformity.      Cervical back: Normal range of motion and neck supple.   Lymphadenopathy:      Cervical: No cervical adenopathy.   Skin:     General: Skin is warm and dry.   Neurological:      Mental Status: She is alert and oriented to person, place, and time.   Psychiatric:         Behavior: Behavior normal.         Thought Content: Thought content normal.         Judgment: Judgment normal.       Results Reviewed:  Glucose   Date Value Ref Range Status   06/06/2023 78 70 - 99 mg/dL Final   04/16/2020 82 70 - 100 mg/dL Final     BUN   Date Value Ref Range Status   06/06/2023 13 6 - 20 mg/dL Final   04/16/2020 9 5 - 21 mg/dL Final     Creatinine   Date Value Ref Range Status   06/06/2023 0.76 0.57 - 1.00 mg/dL Final   04/16/2020 0.71 0.50 - 1.40 mg/dL Final     Sodium   Date Value Ref Range Status   06/06/2023 141 134 - 144 mmol/L Final   04/16/2020 141 135 - 145 mmol/L Final     Potassium   Date Value Ref Range Status   06/06/2023 4.1 3.5 - 5.2 mmol/L Final "   04/16/2020 4.1 3.5 - 5.3 mmol/L Final     Chloride   Date Value Ref Range Status   06/06/2023 105 96 - 106 mmol/L Final   04/16/2020 100 98 - 110 mmol/L Final     CO2   Date Value Ref Range Status   04/16/2020 30.0 24.0 - 31.0 mmol/L Final     Total CO2   Date Value Ref Range Status   06/06/2023 21 20 - 29 mmol/L Final     Calcium   Date Value Ref Range Status   06/06/2023 9.1 8.7 - 10.2 mg/dL Final   04/16/2020 9.2 8.4 - 10.4 mg/dL Final     ALT (SGPT)   Date Value Ref Range Status   06/06/2023 11 0 - 32 IU/L Final   04/16/2020 19 0 - 35 U/L Final     AST (SGOT)   Date Value Ref Range Status   06/06/2023 17 0 - 40 IU/L Final   04/16/2020 21 7 - 45 U/L Final     WBC   Date Value Ref Range Status   06/06/2023 4.7 3.4 - 10.8 x10E3/uL Final     Hematocrit   Date Value Ref Range Status   06/06/2023 44.2 34.0 - 46.6 % Final   05/08/2021 28.1 (L) 34.0 - 46.6 % Final     Platelets   Date Value Ref Range Status   06/06/2023 244 150 - 450 x10E3/uL Final   05/08/2021 202 140 - 450 10*3/mm3 Final     Triglycerides   Date Value Ref Range Status   06/06/2023 63 0 - 149 mg/dL Final     HDL Cholesterol   Date Value Ref Range Status   06/06/2023 43 >39 mg/dL Final     LDL Chol Calc ([facility])   Date Value Ref Range Status   06/06/2023 95 0 - 99 mg/dL Final     Hemoglobin A1C   Date Value Ref Range Status   06/06/2023 4.9 4.8 - 5.6 % Final     Comment:              Prediabetes: 5.7 - 6.4           Diabetes: >6.4           Glycemic control for adults with diabetes: <7.0           Assessment / Plan     Assessment/Plan:  Diagnoses and all orders for this visit:    1. Overweight (BMI 25.0-29.9) (Primary)  -     phentermine (ADIPEX-P) 37.5 MG tablet; Take 1 tablet by mouth Every Morning Before Breakfast.  Dispense: 30 tablet; Refill: 0      No follow-ups on file. unless patient needs to be seen sooner or acute issues arise.    Code Status: Full.     I have discussed the patient results/orders and and plan/recommendation with them at today's  visit.      Clare Gabriel, APRN   10/26/2023

## 2023-10-27 DIAGNOSIS — E66.3 OVERWEIGHT (BMI 25.0-29.9): ICD-10-CM

## 2023-10-27 RX ORDER — PHENTERMINE HYDROCHLORIDE 37.5 MG/1
37.5 TABLET ORAL
Qty: 30 TABLET | Refills: 0 | Status: SHIPPED | OUTPATIENT
Start: 2023-10-27

## 2023-10-27 NOTE — TELEPHONE ENCOUNTER
Caller: Urbano Pineda    Relationship: Self    Best call back number: [phone]     Requested Prescriptions:   Requested Prescriptions     Pending Prescriptions Disp Refills    phentermine (ADIPEX-P) 37.5 MG tablet 30 tablet 0     Sig: Take 1 tablet by mouth Every Morning Before Breakfast.        Pharmacy where request should be sent: [facility]/PHARMACY #[id number] - [place] - [address] [phone] [facility] [phone]      Last office visit with prescribing clinician: 10/26/2023   Last telemedicine visit with prescribing clinician: Visit date not found   Next office visit with prescribing clinician: 1/26/2024     Additional details provided by patient: PATIENT STATED THAT SHE HAS 1 LEFT AND [facility] WAS OUT OF THIS MEDICATION SO IT NEEDS TO BE SENT TO [facility] INSTEAD    Does the patient have less than a 3 day supply:  [x] Yes  [] No    Would you like a call back once the refill request has been completed: [] Yes [x] No    If the office needs to give you a call back, can they leave a voicemail: [] Yes [x] No    Richy Pierre Rep   10/27/23 15:53 CDT

## 2023-11-06 DIAGNOSIS — Z11.1 SCREENING-PULMONARY TB: Primary | ICD-10-CM

## 2023-11-08 ENCOUNTER — PATIENT MESSAGE (OUTPATIENT)
Dept: INTERNAL MEDICINE | Facility: CLINIC | Age: 28
End: 2023-11-08
Payer: COMMERCIAL

## 2023-11-09 RX ORDER — DEXTROMETHORPHAN HYDROBROMIDE, BUPROPION HYDROCHLORIDE 105; 45 MG/1; MG/1
1 TABLET, MULTILAYER, EXTENDED RELEASE ORAL EVERY MORNING
Qty: 30 TABLET | Refills: 1 | Status: SHIPPED | OUTPATIENT
Start: 2023-11-09

## 2023-11-10 PROBLEM — F32.2 CURRENT SEVERE EPISODE OF MAJOR DEPRESSIVE DISORDER: Status: ACTIVE | Noted: 2023-11-10

## 2023-11-16 ENCOUNTER — PRIOR AUTHORIZATION (OUTPATIENT)
Dept: INTERNAL MEDICINE | Facility: CLINIC | Age: 28
End: 2023-11-16
Payer: COMMERCIAL

## 2023-11-16 LAB
GAMMA INTERFERON BACKGROUND BLD IA-ACNC: 0.07 IU/ML
M TB IFN-G BLD-IMP: NEGATIVE
M TB IFN-G CD4+ T-CELLS BLD-ACNC: 0.07 IU/ML
M TBIFN-G CD4+ CD8+T-CELLS BLD-ACNC: 0.08 IU/ML
MITOGEN IGNF BLD-ACNC: >10 IU/ML
QUANTIFERON INCUBATION: NORMAL
SERVICE CMNT-IMP: NORMAL

## 2023-11-16 NOTE — TELEPHONE ENCOUNTER
(Key: [id number])  Rx #: [id number]  Auvelity 45-105MG er tablets    Message from Plan  The request has been approved. The authorization is effective from 11/10/2023 to 11/09/2024, as long as the member is enrolled in their current health plan. The request was approved as submitted. A written notification letter will follow with additional details.

## 2023-12-26 DIAGNOSIS — E66.3 OVERWEIGHT (BMI 25.0-29.9): ICD-10-CM

## 2023-12-27 RX ORDER — PHENTERMINE HYDROCHLORIDE 37.5 MG/1
37.5 TABLET ORAL
Qty: 30 TABLET | Refills: 0 | Status: SHIPPED | OUTPATIENT
Start: 2023-12-27

## 2023-12-27 NOTE — TELEPHONE ENCOUNTER
Rx Refill Note  Requested Prescriptions     Pending Prescriptions Disp Refills    phentermine (ADIPEX-P) 37.5 MG tablet 30 tablet 0     Sig: Take 1 tablet by mouth Every Morning Before Breakfast.      Last office visit with prescribing clinician: 10/26/2023   Last telemedicine visit with prescribing clinician: Visit date not found   Next office visit with prescribing clinician: 1/26/2024                         Would you like a call back once the refill request has been completed: [] Yes [] No    If the office needs to give you a call back, can they leave a voicemail: [] Yes [] No    France Harris RN  12/27/23, 07:14 CST

## 2023-12-28 DIAGNOSIS — E66.3 OVERWEIGHT (BMI 25.0-29.9): ICD-10-CM

## 2023-12-28 RX ORDER — PHENTERMINE HYDROCHLORIDE 37.5 MG/1
37.5 TABLET ORAL
Qty: 30 TABLET | Refills: 0 | OUTPATIENT
Start: 2023-12-28

## 2024-01-09 RX ORDER — DEXTROMETHORPHAN HYDROBROMIDE, BUPROPION HYDROCHLORIDE 105; 45 MG/1; MG/1
1 TABLET, MULTILAYER, EXTENDED RELEASE ORAL EVERY MORNING
Qty: 30 TABLET | Refills: 1 | Status: SHIPPED | OUTPATIENT
Start: 2024-01-09

## 2024-01-09 NOTE — TELEPHONE ENCOUNTER
Rx Refill Note  Requested Prescriptions     Pending Prescriptions Disp Refills    Dextromethorphan-buPROPion ER (Auvelity)  MG tablet controlled-release 30 tablet 1     Sig: Take 1 tablet by mouth Every Morning.      Last office visit with prescribing clinician: 10/26/2023   Last telemedicine visit with prescribing clinician: Visit date not found   Next office visit with prescribing clinician: 1/26/2024                         Would you like a call back once the refill request has been completed: [] Yes [] No    If the office needs to give you a call back, can they leave a voicemail: [] Yes [] No    France Harris RN  01/09/24, 08:05 CST

## 2024-01-10 RX ORDER — DEXTROMETHORPHAN HYDROBROMIDE, BUPROPION HYDROCHLORIDE 105; 45 MG/1; MG/1
1 TABLET, MULTILAYER, EXTENDED RELEASE ORAL EVERY MORNING
Qty: 30 TABLET | Refills: 0 | OUTPATIENT
Start: 2024-01-10

## 2024-01-26 ENCOUNTER — OFFICE VISIT (OUTPATIENT)
Dept: INTERNAL MEDICINE | Facility: CLINIC | Age: 29
End: 2024-01-26
Payer: COMMERCIAL

## 2024-01-26 VITALS
HEIGHT: 64 IN | TEMPERATURE: 98.2 F | RESPIRATION RATE: 16 BRPM | OXYGEN SATURATION: 100 % | BODY MASS INDEX: 23.22 KG/M2 | HEART RATE: 84 BPM | DIASTOLIC BLOOD PRESSURE: 72 MMHG | WEIGHT: 136 LBS | SYSTOLIC BLOOD PRESSURE: 102 MMHG

## 2024-01-26 DIAGNOSIS — E66.3 OVERWEIGHT (BMI 25.0-29.9): ICD-10-CM

## 2024-01-26 DIAGNOSIS — F33.1 MAJOR DEPRESSIVE DISORDER, RECURRENT, MODERATE: ICD-10-CM

## 2024-01-26 DIAGNOSIS — F41.9 ANXIETY: Primary | ICD-10-CM

## 2024-01-26 RX ORDER — DEXTROMETHORPHAN HYDROBROMIDE, BUPROPION HYDROCHLORIDE 105; 45 MG/1; MG/1
1 TABLET, MULTILAYER, EXTENDED RELEASE ORAL 2 TIMES DAILY
Qty: 60 TABLET | Refills: 6 | Status: SHIPPED | OUTPATIENT
Start: 2024-01-26

## 2024-01-26 RX ORDER — PHENTERMINE HYDROCHLORIDE 37.5 MG/1
37.5 TABLET ORAL
Qty: 30 TABLET | Refills: 0 | Status: SHIPPED | OUTPATIENT
Start: 2024-01-26

## 2024-01-26 NOTE — PROGRESS NOTES
Subjective     Chief Complaint   Patient presents with    Obesity     Follow up       Obesity      Pt comes in today to follow up on anxiety/depression/and needs  refill on Phentermine. She has lost about 16 pounds. She can't really tell that she has lost weight.     She was placed on Auveilty and has continued the Wellbutrin. She is doing ok. Denies any suicidal ideation. She does think her depression anxiety is better with more energy and less irritability.       Review of Systems   Otherwise complete ROS reviewed and negative except as mentioned in the HPI.    Past Medical History:   Past Medical History:   Diagnosis Date    Spon  w/o complication      Past Surgical History:  Past Surgical History:   Procedure Laterality Date    WISDOM TOOTH EXTRACTION       Social History:  reports that she quit smoking about 3 years ago. Her smoking use included cigarettes. She has a 1.00 pack-year smoking history. She has been exposed to tobacco smoke. She has never used smokeless tobacco. She reports current alcohol use. She reports that she does not use drugs.    Family History: family history includes Breast cancer in her maternal aunt, maternal aunt, maternal grandmother, maternal great-grandmother, and paternal aunt; Cervical cancer in her maternal grandmother; No Known Problems in her brother, father, mother, paternal grandfather, paternal grandmother, and sister.       Allergies:  Allergies   Allergen Reactions    Cephalexin Other (See Comments)     Passed out     Medications:  Prior to Admission medications    Medication Sig Start Date End Date Taking? Authorizing Provider   buPROPion XL (Wellbutrin XL) 300 MG 24 hr tablet Take 1 tablet by mouth Daily. 10/16/23  Yes Clare Gabriel APRN   Dextromethorphan-buPROPion ER (Auvelity)  MG tablet controlled-release Take 1 tablet by mouth Every Morning. 24  Yes Clare Gabriel APRN   Levonorgestrel (MIRENA, 52 MG, IU) by Intrauterine  "route.   Yes Provider, MD Wilver   phentermine (ADIPEX-P) 37.5 MG tablet Take 1 tablet by mouth Every Morning Before Breakfast. 12/27/23  Yes Clare Gabriel APRN   escitalopram (Lexapro) 20 MG tablet Take 1 tablet by mouth Daily.  Patient not taking: Reported on 1/26/2024 10/16/23   Clare Gabriel APRN   vitamin D (ERGOCALCIFEROL) 1.25 MG (52403 UT) capsule capsule Take 1 capsule by mouth 2 (Two) Times a Week.  Patient not taking: Reported on 1/26/2024 6/8/23   Clare Gabriel APRN       Objective     Vital Signs: /72 (BP Location: Left arm, Patient Position: Sitting, Cuff Size: Adult)   Pulse 84   Temp 98.2 °F (36.8 °C) (Infrared)   Resp 16   Ht 162.6 cm (64\")   Wt 61.7 kg (136 lb)   SpO2 100%   BMI 23.34 kg/m²     Physical Exam  Vitals reviewed.   Constitutional:       Appearance: She is well-developed.   HENT:      Head: Normocephalic and atraumatic.   Eyes:      Pupils: Pupils are equal, round, and reactive to light.   Neck:      Vascular: No JVD.   Cardiovascular:      Rate and Rhythm: Normal rate and regular rhythm.   Pulmonary:      Effort: Pulmonary effort is normal.      Breath sounds: Normal breath sounds.   Abdominal:      General: Bowel sounds are normal.      Palpations: Abdomen is soft.   Musculoskeletal:         General: No deformity.      Cervical back: Normal range of motion and neck supple.   Lymphadenopathy:      Cervical: No cervical adenopathy.   Skin:     General: Skin is warm and dry.   Neurological:      Mental Status: She is alert and oriented to person, place, and time.   Psychiatric:         Behavior: Behavior normal.         Thought Content: Thought content normal.         Judgment: Judgment normal.       BMI is within normal parameters. No other follow-up for BMI required.      Results Reviewed:  Glucose   Date Value Ref Range Status   06/06/2023 78 70 - 99 mg/dL Final   04/16/2020 82 70 - 100 mg/dL Final     BUN   Date Value Ref Range " Status   06/06/2023 13 6 - 20 mg/dL Final   04/16/2020 9 5 - 21 mg/dL Final     Creatinine   Date Value Ref Range Status   06/06/2023 0.76 0.57 - 1.00 mg/dL Final   04/16/2020 0.71 0.50 - 1.40 mg/dL Final     Sodium   Date Value Ref Range Status   06/06/2023 141 134 - 144 mmol/L Final   04/16/2020 141 135 - 145 mmol/L Final     Potassium   Date Value Ref Range Status   06/06/2023 4.1 3.5 - 5.2 mmol/L Final   04/16/2020 4.1 3.5 - 5.3 mmol/L Final     Chloride   Date Value Ref Range Status   06/06/2023 105 96 - 106 mmol/L Final   04/16/2020 100 98 - 110 mmol/L Final     CO2   Date Value Ref Range Status   04/16/2020 30.0 24.0 - 31.0 mmol/L Final     Total CO2   Date Value Ref Range Status   06/06/2023 21 20 - 29 mmol/L Final     Calcium   Date Value Ref Range Status   06/06/2023 9.1 8.7 - 10.2 mg/dL Final   04/16/2020 9.2 8.4 - 10.4 mg/dL Final     ALT (SGPT)   Date Value Ref Range Status   06/06/2023 11 0 - 32 IU/L Final   04/16/2020 19 0 - 35 U/L Final     AST (SGOT)   Date Value Ref Range Status   06/06/2023 17 0 - 40 IU/L Final   04/16/2020 21 7 - 45 U/L Final     WBC   Date Value Ref Range Status   06/06/2023 4.7 3.4 - 10.8 x10E3/uL Final     Hematocrit   Date Value Ref Range Status   06/06/2023 44.2 34.0 - 46.6 % Final   05/08/2021 28.1 (L) 34.0 - 46.6 % Final     Platelets   Date Value Ref Range Status   06/06/2023 244 150 - 450 x10E3/uL Final   05/08/2021 202 140 - 450 10*3/mm3 Final     Triglycerides   Date Value Ref Range Status   06/06/2023 63 0 - 149 mg/dL Final     HDL Cholesterol   Date Value Ref Range Status   06/06/2023 43 >39 mg/dL Final     LDL Chol Calc (NIH)   Date Value Ref Range Status   06/06/2023 95 0 - 99 mg/dL Final     Hemoglobin A1C   Date Value Ref Range Status   06/06/2023 4.9 4.8 - 5.6 % Final     Comment:              Prediabetes: 5.7 - 6.4           Diabetes: >6.4           Glycemic control for adults with diabetes: <7.0           Assessment / Plan     Assessment/Plan:  Diagnoses and  all orders for this visit:    1. Anxiety (Primary)  -     Dextromethorphan-buPROPion ER (Auvelity)  MG tablet controlled-release; Take 1 tablet by mouth 2 (Two) Times a Day.  Dispense: 60 tablet; Refill: 6    2. Major depressive disorder, recurrent, moderate  -     Dextromethorphan-buPROPion ER (Auvelity)  MG tablet controlled-release; Take 1 tablet by mouth 2 (Two) Times a Day.  Dispense: 60 tablet; Refill: 6    3. Overweight (BMI 25.0-29.9)  -     phentermine (ADIPEX-P) 37.5 MG tablet; Take 1 tablet by mouth Every Morning Before Breakfast.  Dispense: 30 tablet; Refill: 0      Return in about 3 months (around 4/26/2024). unless patient needs to be seen sooner or acute issues arise.    Code Status: Full.     I have discussed the patient results/orders and and plan/recommendation with them at today's visit.      Signed by:    AYALA Majano Date: 01/26/24

## 2024-02-16 DIAGNOSIS — F33.1 MAJOR DEPRESSIVE DISORDER, RECURRENT, MODERATE: ICD-10-CM

## 2024-02-16 DIAGNOSIS — F41.9 ANXIETY: ICD-10-CM

## 2024-02-16 RX ORDER — DEXTROMETHORPHAN HYDROBROMIDE, BUPROPION HYDROCHLORIDE 105; 45 MG/1; MG/1
1 TABLET, MULTILAYER, EXTENDED RELEASE ORAL 2 TIMES DAILY
Qty: 60 TABLET | Refills: 6 | Status: SHIPPED | OUTPATIENT
Start: 2024-02-16

## 2024-02-20 DIAGNOSIS — F33.1 MAJOR DEPRESSIVE DISORDER, RECURRENT, MODERATE: ICD-10-CM

## 2024-02-20 DIAGNOSIS — F41.9 ANXIETY: ICD-10-CM

## 2024-02-20 RX ORDER — DEXTROMETHORPHAN HYDROBROMIDE, BUPROPION HYDROCHLORIDE 105; 45 MG/1; MG/1
1 TABLET, MULTILAYER, EXTENDED RELEASE ORAL EVERY MORNING
Qty: 30 TABLET | Refills: 0 | OUTPATIENT
Start: 2024-02-20

## 2024-02-27 ENCOUNTER — PATIENT MESSAGE (OUTPATIENT)
Dept: INTERNAL MEDICINE | Facility: CLINIC | Age: 29
End: 2024-02-27
Payer: COMMERCIAL

## 2024-02-27 DIAGNOSIS — F90.9 ATTENTION DEFICIT HYPERACTIVITY DISORDER (ADHD), UNSPECIFIED ADHD TYPE: Primary | ICD-10-CM

## 2024-03-12 ENCOUNTER — PROCEDURE VISIT (OUTPATIENT)
Dept: OBSTETRICS AND GYNECOLOGY | Age: 29
End: 2024-03-12
Payer: COMMERCIAL

## 2024-03-12 VITALS
SYSTOLIC BLOOD PRESSURE: 110 MMHG | DIASTOLIC BLOOD PRESSURE: 60 MMHG | HEIGHT: 64 IN | RESPIRATION RATE: 18 BRPM | WEIGHT: 136 LBS | BODY MASS INDEX: 23.22 KG/M2

## 2024-03-12 DIAGNOSIS — T83.32XA INTRAUTERINE CONTRACEPTIVE DEVICE THREADS LOST, INITIAL ENCOUNTER: ICD-10-CM

## 2024-03-12 DIAGNOSIS — Z30.432 ENCOUNTER FOR IUD REMOVAL: Primary | ICD-10-CM

## 2024-03-12 RX ORDER — SODIUM CHLORIDE 0.9 % (FLUSH) 0.9 %
1-10 SYRINGE (ML) INJECTION AS NEEDED
OUTPATIENT
Start: 2024-03-12

## 2024-03-12 RX ORDER — SODIUM CHLORIDE 0.9 % (FLUSH) 0.9 %
10 SYRINGE (ML) INJECTION EVERY 12 HOURS SCHEDULED
OUTPATIENT
Start: 2024-03-12

## 2024-03-12 RX ORDER — SODIUM CHLORIDE 9 MG/ML
40 INJECTION, SOLUTION INTRAVENOUS AS NEEDED
OUTPATIENT
Start: 2024-03-12

## 2024-03-12 RX ORDER — SODIUM CHLORIDE 9 MG/ML
100 INJECTION, SOLUTION INTRAVENOUS CONTINUOUS
OUTPATIENT
Start: 2024-03-12

## 2024-03-12 NOTE — PROGRESS NOTES
"Urbano Pineda is a 28 y.o. female here today requesting IUD removal.  She has a Mirena IUD in place since September 2021.  She does not desire subsequent contraception.  She was seen in the office today by a nurse practitioner, and the IUD strings were not visible.  Ultrasound in the office today has confirmed that the IUD is intrauterine.    Visit Vitals  /60   Resp 18   Ht 162.6 cm (64\")   Wt 61.7 kg (136 lb)   BMI 23.34 kg/m²     Pleasant female no acute distress  Mood and affect normal  Breathing unlabored  On speculum examination the cervix appears normal.  The IUD strings are not visible.    Attempts at IUD removal were unsuccessful.  The cervix was grasped with a tenaculum and the cervix slightly dilated with an os finder.  The strings of the IUD were unable to be grasped with a long Jennifer clamp or small Rosario curette despite multiple attempts.  The patient tolerated the procedure well.    Assessment: Lost IUD strings, desires IUD removal    Office removal has been unsuccessful therefore we will proceed with hysteroscopy and IUD removal in the OR. We discussed a planned surgical procedure of hysteroscopy, dilation and curettage, with IUD removal.  We discussed surgical risks of bleeding, infection, uterine perforation, and damage to surrounding structures including bowel, bladder, ureters, and other viscera.  All of her questions have been answered and she is scheduled for surgery on March 20.  Preoperative orders including CBC and BMP have been placed.  Call with questions or concerns.       "

## 2024-03-12 NOTE — PROGRESS NOTES
Chief Complaint   Patient presents with    IUD removal     Patient present for IUD removal and does not want another birth control at the moment.          IUD strings not visible on exam, please see Dr. Olguin's note      Amanda CAI

## 2024-03-13 ENCOUNTER — PRE-ADMISSION TESTING (OUTPATIENT)
Dept: PREADMISSION TESTING | Facility: HOSPITAL | Age: 29
End: 2024-03-13
Payer: COMMERCIAL

## 2024-03-13 VITALS
HEIGHT: 64 IN | WEIGHT: 136.69 LBS | RESPIRATION RATE: 18 BRPM | OXYGEN SATURATION: 100 % | DIASTOLIC BLOOD PRESSURE: 49 MMHG | HEART RATE: 71 BPM | BODY MASS INDEX: 23.34 KG/M2 | SYSTOLIC BLOOD PRESSURE: 100 MMHG

## 2024-03-13 DIAGNOSIS — T83.32XA INTRAUTERINE CONTRACEPTIVE DEVICE THREADS LOST, INITIAL ENCOUNTER: ICD-10-CM

## 2024-03-13 LAB
ANION GAP SERPL CALCULATED.3IONS-SCNC: 8 MMOL/L (ref 5–15)
BASOPHILS # BLD AUTO: 0.06 10*3/MM3 (ref 0–0.2)
BASOPHILS NFR BLD AUTO: 1 % (ref 0–1.5)
BUN SERPL-MCNC: 10 MG/DL (ref 6–20)
BUN/CREAT SERPL: 15.9 (ref 7–25)
CALCIUM SPEC-SCNC: 8.3 MG/DL (ref 8.6–10.5)
CHLORIDE SERPL-SCNC: 105 MMOL/L (ref 98–107)
CO2 SERPL-SCNC: 27 MMOL/L (ref 22–29)
CREAT SERPL-MCNC: 0.63 MG/DL (ref 0.57–1)
DEPRECATED RDW RBC AUTO: 39.7 FL (ref 37–54)
EGFRCR SERPLBLD CKD-EPI 2021: 124.9 ML/MIN/1.73
EOSINOPHIL # BLD AUTO: 0.17 10*3/MM3 (ref 0–0.4)
EOSINOPHIL NFR BLD AUTO: 2.9 % (ref 0.3–6.2)
ERYTHROCYTE [DISTWIDTH] IN BLOOD BY AUTOMATED COUNT: 12.5 % (ref 12.3–15.4)
GLUCOSE SERPL-MCNC: 80 MG/DL (ref 65–99)
HCT VFR BLD AUTO: 38.6 % (ref 34–46.6)
HGB BLD-MCNC: 13 G/DL (ref 12–15.9)
IMM GRANULOCYTES # BLD AUTO: 0.02 10*3/MM3 (ref 0–0.05)
IMM GRANULOCYTES NFR BLD AUTO: 0.3 % (ref 0–0.5)
LYMPHOCYTES # BLD AUTO: 2.37 10*3/MM3 (ref 0.7–3.1)
LYMPHOCYTES NFR BLD AUTO: 39.8 % (ref 19.6–45.3)
MCH RBC QN AUTO: 29.1 PG (ref 26.6–33)
MCHC RBC AUTO-ENTMCNC: 33.7 G/DL (ref 31.5–35.7)
MCV RBC AUTO: 86.5 FL (ref 79–97)
MONOCYTES # BLD AUTO: 0.29 10*3/MM3 (ref 0.1–0.9)
MONOCYTES NFR BLD AUTO: 4.9 % (ref 5–12)
NEUTROPHILS NFR BLD AUTO: 3.04 10*3/MM3 (ref 1.7–7)
NEUTROPHILS NFR BLD AUTO: 51.1 % (ref 42.7–76)
NRBC BLD AUTO-RTO: 0 /100 WBC (ref 0–0.2)
PLATELET # BLD AUTO: 261 10*3/MM3 (ref 140–450)
PMV BLD AUTO: 9.1 FL (ref 6–12)
POTASSIUM SERPL-SCNC: 3.6 MMOL/L (ref 3.5–5.2)
RBC # BLD AUTO: 4.46 10*6/MM3 (ref 3.77–5.28)
SODIUM SERPL-SCNC: 140 MMOL/L (ref 136–145)
WBC NRBC COR # BLD AUTO: 5.95 10*3/MM3 (ref 3.4–10.8)

## 2024-03-13 PROCEDURE — 80048 BASIC METABOLIC PNL TOTAL CA: CPT

## 2024-03-13 PROCEDURE — 85025 COMPLETE CBC W/AUTO DIFF WBC: CPT

## 2024-03-13 PROCEDURE — 36415 COLL VENOUS BLD VENIPUNCTURE: CPT

## 2024-03-13 NOTE — DISCHARGE INSTRUCTIONS
Preparing for Surgery  Follow these instructions before the procedure:  Several days or weeks before your procedure  Medication(s) you need to stop   _______ days/week prior to surgery --      Ask your health care provider about:  Changing or stopping your regular medicines. This is especially important if you are taking diabetes medicines or blood thinners.  Taking medicines such as aspirin and non-steroidal anti-inflammatory drugs (NSAIDS) such as ibuprofen that can thin your blood. Do not take these medicines unless your health care provider tells you to take them.  Taking over-the-counter medicines, vitamins, herbs, and supplements.  Contact your surgeon if you:  Develop a fever of more than 100.4°F (38°C) or other feelings of illness during the 48 hours before your surgery.  Have symptoms that get worse.  Have questions or concerns about your surgery.  If you are going home the same day of your surgery you will need to arrange for a responsible adult, age 18 years old or older, to drive you home from the hospital and stay with you for 24 hours. Verification of the  will be made prior to any procedure requiring sedation. You may not go home in a taxi or any form of public transportation by yourself.     Day before your procedure  Medication(s) you need to stop the day before your surgery:--    24 hours before your procedure DO NOT drink alcoholic beverages or smoke.  24 hours before your procedure STOP taking Erectile Dysfunction medication (i.e.,Cialis, Viagra)   You may be asked to shower with a germ-killing soap.  Day of your procedure   You may take the following medication(s) the morning of surgery with a sip of water: --      8 hours before your procedure STOP all food, any dairy products, and full liquids. This includes hard candy, chewing gum or mints. This is extremely important to prevent serious complications.   Up to 2 hours before your scheduled arrival time, you may have clear liquids no  cream, powder, or pulp of any kind. Safe options are water, black coffee, plain tea, soda, Gatorade/Powerade, clear broth, apple juice.  2 hours before your scheduled arrival time, STOP drinking clear liquids.  You may need to take another shower with a germ-killing soap before you leave home in the morning. Do not use perfumes, colognes, or body lotions.  Wear comfortable loose-fitting clothing.  Remove all jewelry including body piercing and rings, dark colored nail polish, and make up prior to arrival at the hospital. Leave all valuables at home.   Bring your hearing aids if you rely on them.  Do not wear contact lenses. If you wear eyeglasses remember to bring a case to store them in while you are in surgery.  Do not use denture adhesives since you will be asked to remove them during your surgery.    You do not need to bring your home medications into the hospital.   Bring your sleep apnea device with you on the day of your surgery (if this applies to you).  If you wear portable oxygen, bring it with you.   If you are staying overnight, you may bring a bag of items you may need such as slippers, robe and a change of clothes for your discharge. You may want to leave these items in the car until you are ready for them since your family will take your belongings when you leave the pre-operative area.  Arrive at the hospital as scheduled by the office. You will be asked to arrive 2 hours prior to your surgery time in order to prepare for your procedure.  When you arrive at the hospital  Go to the registration desk located at the main entrance of the hospital.  After registration is completed, you will be given a beeper and a sticker sheet. Take the stickers to Outpatient Surgery and place in the tray at the end of the desk to notify the staff that you have arrived and registered.   Return to the lobby to wait. You are not always called back according to the time of arrival but rather the time your doctor will be  ready.  When your beeper lights up and vibrates proceed through the double doors, under the stairs, and a member of the Outpatient Surgery staff will escort you to your preoperative room.

## 2024-03-13 NOTE — H&P
[facility]  Urbano Pineda  : 1995  MRN: [id number]  CSN: [id number]    History and Physical    Subjective   Urbano Pineda is a 28 y.o. year old  who presents for consultation about surgery due to lost IUD strings, desires IUD removal.  Ultrasound shows the IUD to be located intrauterine, but office attempts at removal of the IUD were unsuccessful.    Past Medical History:   Diagnosis Date    Spon  w/o complication      Past Surgical History:   Procedure Laterality Date    WISDOM TOOTH EXTRACTION       Social History    Tobacco Use      Smoking status: Former        Packs/day: 0.00        Years: 0.5 packs/day for 2.0 years (1.0 ttl pk-yrs)        Types: Cigarettes        Start date:         Quit date:         Years since quitting: 3.1        Passive exposure: Past      Smokeless tobacco: Never      Current Outpatient Medications:     Dextromethorphan-buPROPion ER (Auvelity)  MG tablet controlled-release, Take 1 tablet by mouth 2 (Two) Times a Day., Disp: 60 tablet, Rfl: 6    Allergies   Allergen Reactions    Cephalexin Other (See Comments)     Passed out       Family History   Problem Relation Age of Onset    No Known Problems Father     No Known Problems Mother     No Known Problems Brother     No Known Problems Sister     No Known Problems Paternal Grandfather     No Known Problems Paternal Grandmother     Breast cancer Maternal Grandmother     Cervical cancer Maternal Grandmother     Breast cancer Maternal Aunt     Breast cancer Maternal Aunt     Breast cancer Paternal Aunt     Breast cancer Maternal Great-Grandmother      Review of Systems   Constitutional:  Negative for unexpected weight change.   HENT:  Negative for trouble swallowing.    Respiratory:  Negative for shortness of breath.    Cardiovascular:  Negative for chest pain.   Musculoskeletal:  Negative for neck stiffness.   Neurological:  Negative for seizures.   Hematological:  Does not bruise/bleed easily.        "  Objective   /60   Resp 18   Ht 162.6 cm (64\")   Wt 61.7 kg (136 lb)   BMI 23.34 kg/m²     Physical Exam   Physical Exam  Vitals reviewed.   Constitutional:       Appearance: She is well-developed.   HENT:      Head: Normocephalic and atraumatic.   Eyes:      General: No scleral icterus.  Neck:      Trachea: No tracheal deviation.   Cardiovascular:      Rate and Rhythm: Normal rate and regular rhythm.   Pulmonary:      Effort: Pulmonary effort is normal.      Breath sounds: Normal breath sounds.   Abdominal:      General: Bowel sounds are normal. There is no distension.      Palpations: Abdomen is soft.      Tenderness: There is no abdominal tenderness.   Genitourinary:     Exam position: Supine.      Labia:         Right: No lesion.         Left: No lesion.       Vagina: No vaginal discharge or bleeding.      Cervix: No cervical motion tenderness.      Uterus: Not enlarged, not fixed and not tender.       Adnexa:         Right: No mass.          Left: No mass.        Rectum: No external hemorrhoid.   Skin:     General: Skin is warm and dry.   Neurological:      Mental Status: She is alert and oriented to person, place, and time.         Labs  Lab Results   Component Value Date     06/06/2023    HGB 14.7 06/06/2023    HCT 44.2 06/06/2023    WBC 4.7 06/06/2023     06/06/2023    K 4.1 06/06/2023     06/06/2023    CO2 21 06/06/2023    BUN 13 06/06/2023    CREATININE 0.76 06/06/2023    GLUCOSE 78 06/06/2023    ALBUMIN 4.5 06/06/2023    CALCIUM 9.1 06/06/2023    AST 17 06/06/2023    ALT 11 06/06/2023    BILITOT 0.4 06/06/2023        Assessment & Plan    Diagnoses and all orders for this visit:    1. Encounter for IUD removal (Primary)    2. Intrauterine contraceptive device threads lost, initial encounter  -     Case Request  -     CBC & Differential; Future  -     Basic Metabolic Panel; Future  -     sodium chloride 0.9 % flush 10 mL  -     sodium chloride 0.9 % flush 1-10 mL  -     sodium " chloride 0.9 % infusion 40 mL  -     sodium chloride 0.9 % infusion    Other orders  -     Follow Anesthesia Guidelines / Protocol; Future  -     Follow Anesthesia Guidelines / Protocol; Standing  -     Obtain Informed Consent; Future  -     Provide NPO Instructions to Patient; Future  -     Insert Peripheral IV; Standing  -     Saline Lock & Maintain IV Access; Standing    Risks, benefits, and alternatives of a D&C with hysteroscopy were discussed with the patient in detail. Intraoperative risks of bleeding and damage to surrounding organs, including but not limited to intestine, bladder and ureter, were explained. Management of these were also explained. Postoperative complications such as infection, pneumonia, DVT, and bleeding were explained. The importance of compliance with postoperative restrictions was discussed. The diagnostic nature of the procedure was explained.    Uterine perforation was discussed with the patient at length.     All of the patient's questions were answered to her satisfaction. She was encouraged to return for an additional appointment if she had further questions. She verbalized understanding of the above and wished to proceed with the outlined plan.      Sean Olguin MD  3/13/2024  08:05 CDT

## 2024-03-13 NOTE — H&P (VIEW-ONLY)
[facility]  Urbano Pineda  : 1995  MRN: [id number]  CSN: [id number]    History and Physical    Subjective   Urbano Pineda is a 28 y.o. year old  who presents for consultation about surgery due to lost IUD strings, desires IUD removal.  Ultrasound shows the IUD to be located intrauterine, but office attempts at removal of the IUD were unsuccessful.    Past Medical History:   Diagnosis Date    Spon  w/o complication      Past Surgical History:   Procedure Laterality Date    WISDOM TOOTH EXTRACTION       Social History    Tobacco Use      Smoking status: Former        Packs/day: 0.00        Years: 0.5 packs/day for 2.0 years (1.0 ttl pk-yrs)        Types: Cigarettes        Start date:         Quit date:         Years since quitting: 3.1        Passive exposure: Past      Smokeless tobacco: Never      Current Outpatient Medications:     Dextromethorphan-buPROPion ER (Auvelity)  MG tablet controlled-release, Take 1 tablet by mouth 2 (Two) Times a Day., Disp: 60 tablet, Rfl: 6    Allergies   Allergen Reactions    Cephalexin Other (See Comments)     Passed out       Family History   Problem Relation Age of Onset    No Known Problems Father     No Known Problems Mother     No Known Problems Brother     No Known Problems Sister     No Known Problems Paternal Grandfather     No Known Problems Paternal Grandmother     Breast cancer Maternal Grandmother     Cervical cancer Maternal Grandmother     Breast cancer Maternal Aunt     Breast cancer Maternal Aunt     Breast cancer Paternal Aunt     Breast cancer Maternal Great-Grandmother      Review of Systems   Constitutional:  Negative for unexpected weight change.   HENT:  Negative for trouble swallowing.    Respiratory:  Negative for shortness of breath.    Cardiovascular:  Negative for chest pain.   Musculoskeletal:  Negative for neck stiffness.   Neurological:  Negative for seizures.   Hematological:  Does not bruise/bleed easily.        "  Objective   /60   Resp 18   Ht 162.6 cm (64\")   Wt 61.7 kg (136 lb)   BMI 23.34 kg/m²     Physical Exam   Physical Exam  Vitals reviewed.   Constitutional:       Appearance: She is well-developed.   HENT:      Head: Normocephalic and atraumatic.   Eyes:      General: No scleral icterus.  Neck:      Trachea: No tracheal deviation.   Cardiovascular:      Rate and Rhythm: Normal rate and regular rhythm.   Pulmonary:      Effort: Pulmonary effort is normal.      Breath sounds: Normal breath sounds.   Abdominal:      General: Bowel sounds are normal. There is no distension.      Palpations: Abdomen is soft.      Tenderness: There is no abdominal tenderness.   Genitourinary:     Exam position: Supine.      Labia:         Right: No lesion.         Left: No lesion.       Vagina: No vaginal discharge or bleeding.      Cervix: No cervical motion tenderness.      Uterus: Not enlarged, not fixed and not tender.       Adnexa:         Right: No mass.          Left: No mass.        Rectum: No external hemorrhoid.   Skin:     General: Skin is warm and dry.   Neurological:      Mental Status: She is alert and oriented to person, place, and time.         Labs  Lab Results   Component Value Date     06/06/2023    HGB 14.7 06/06/2023    HCT 44.2 06/06/2023    WBC 4.7 06/06/2023     06/06/2023    K 4.1 06/06/2023     06/06/2023    CO2 21 06/06/2023    BUN 13 06/06/2023    CREATININE 0.76 06/06/2023    GLUCOSE 78 06/06/2023    ALBUMIN 4.5 06/06/2023    CALCIUM 9.1 06/06/2023    AST 17 06/06/2023    ALT 11 06/06/2023    BILITOT 0.4 06/06/2023        Assessment & Plan    Diagnoses and all orders for this visit:    1. Encounter for IUD removal (Primary)    2. Intrauterine contraceptive device threads lost, initial encounter  -     Case Request  -     CBC & Differential; Future  -     Basic Metabolic Panel; Future  -     sodium chloride 0.9 % flush 10 mL  -     sodium chloride 0.9 % flush 1-10 mL  -     sodium " chloride 0.9 % infusion 40 mL  -     sodium chloride 0.9 % infusion    Other orders  -     Follow Anesthesia Guidelines / Protocol; Future  -     Follow Anesthesia Guidelines / Protocol; Standing  -     Obtain Informed Consent; Future  -     Provide NPO Instructions to Patient; Future  -     Insert Peripheral IV; Standing  -     Saline Lock & Maintain IV Access; Standing    Risks, benefits, and alternatives of a D&C with hysteroscopy were discussed with the patient in detail. Intraoperative risks of bleeding and damage to surrounding organs, including but not limited to intestine, bladder and ureter, were explained. Management of these were also explained. Postoperative complications such as infection, pneumonia, DVT, and bleeding were explained. The importance of compliance with postoperative restrictions was discussed. The diagnostic nature of the procedure was explained.    Uterine perforation was discussed with the patient at length.     All of the patient's questions were answered to her satisfaction. She was encouraged to return for an additional appointment if she had further questions. She verbalized understanding of the above and wished to proceed with the outlined plan.      Sean Olguin MD  3/13/2024  08:05 CDT

## 2024-03-20 ENCOUNTER — ANESTHESIA EVENT (OUTPATIENT)
Dept: PERIOP | Facility: HOSPITAL | Age: 29
End: 2024-03-20
Payer: COMMERCIAL

## 2024-03-20 ENCOUNTER — ANESTHESIA (OUTPATIENT)
Dept: PERIOP | Facility: HOSPITAL | Age: 29
End: 2024-03-20
Payer: COMMERCIAL

## 2024-03-20 ENCOUNTER — HOSPITAL ENCOUNTER (OUTPATIENT)
Facility: HOSPITAL | Age: 29
Setting detail: HOSPITAL OUTPATIENT SURGERY
Discharge: HOME | End: 2024-03-20
Attending: OBSTETRICS & GYNECOLOGY | Admitting: OBSTETRICS & GYNECOLOGY
Payer: COMMERCIAL

## 2024-03-20 VITALS
HEART RATE: 74 BPM | SYSTOLIC BLOOD PRESSURE: 94 MMHG | OXYGEN SATURATION: 100 % | RESPIRATION RATE: 14 BRPM | TEMPERATURE: 97.5 F | DIASTOLIC BLOOD PRESSURE: 44 MMHG

## 2024-03-20 DIAGNOSIS — T83.32XA INTRAUTERINE CONTRACEPTIVE DEVICE THREADS LOST, INITIAL ENCOUNTER: ICD-10-CM

## 2024-03-20 LAB — B-HCG UR QL: NEGATIVE

## 2024-03-20 PROCEDURE — 25010000002 FENTANYL CITRATE (PF) 100 MCG/2ML SOLUTION: Performed by: NURSE ANESTHETIST, CERTIFIED REGISTERED

## 2024-03-20 PROCEDURE — 58558 HYSTEROSCOPY BIOPSY: CPT | Performed by: OBSTETRICS & GYNECOLOGY

## 2024-03-20 PROCEDURE — 58301 REMOVE INTRAUTERINE DEVICE: CPT | Performed by: OBSTETRICS & GYNECOLOGY

## 2024-03-20 PROCEDURE — 25010000002 ONDANSETRON PER 1 MG: Performed by: NURSE ANESTHETIST, CERTIFIED REGISTERED

## 2024-03-20 PROCEDURE — 25810000003 LACTATED RINGERS PER 1000 ML: Performed by: OBSTETRICS & GYNECOLOGY

## 2024-03-20 PROCEDURE — 25010000002 DEXAMETHASONE PER 1 MG: Performed by: ANESTHESIOLOGY

## 2024-03-20 PROCEDURE — 25010000002 MIDAZOLAM PER 1 MG: Performed by: ANESTHESIOLOGY

## 2024-03-20 PROCEDURE — 25010000002 PROPOFOL 10 MG/ML EMULSION: Performed by: NURSE ANESTHETIST, CERTIFIED REGISTERED

## 2024-03-20 PROCEDURE — 81025 URINE PREGNANCY TEST: CPT | Performed by: OBSTETRICS & GYNECOLOGY

## 2024-03-20 RX ORDER — PROPOFOL 10 MG/ML
VIAL (ML) INTRAVENOUS AS NEEDED
Status: DISCONTINUED | OUTPATIENT
Start: 2024-03-20 | End: 2024-03-20 | Stop reason: SURG

## 2024-03-20 RX ORDER — SODIUM CHLORIDE 9 MG/ML
100 INJECTION, SOLUTION INTRAVENOUS CONTINUOUS
Status: DISCONTINUED | OUTPATIENT
Start: 2024-03-20 | End: 2024-03-20 | Stop reason: HOSPADM

## 2024-03-20 RX ORDER — LIDOCAINE HYDROCHLORIDE 10 MG/ML
0.5 INJECTION, SOLUTION EPIDURAL; INFILTRATION; INTRACAUDAL; PERINEURAL ONCE AS NEEDED
Status: DISCONTINUED | OUTPATIENT
Start: 2024-03-20 | End: 2024-03-20 | Stop reason: HOSPADM

## 2024-03-20 RX ORDER — BUPIVACAINE HCL/0.9 % NACL/PF 0.125 %
PLASTIC BAG, INJECTION (ML) EPIDURAL AS NEEDED
Status: DISCONTINUED | OUTPATIENT
Start: 2024-03-20 | End: 2024-03-20 | Stop reason: SURG

## 2024-03-20 RX ORDER — DROPERIDOL 2.5 MG/ML
0.62 INJECTION, SOLUTION INTRAMUSCULAR; INTRAVENOUS ONCE AS NEEDED
Status: DISCONTINUED | OUTPATIENT
Start: 2024-03-20 | End: 2024-03-20 | Stop reason: HOSPADM

## 2024-03-20 RX ORDER — IBUPROFEN 600 MG/1
600 TABLET ORAL EVERY 6 HOURS PRN
Status: DISCONTINUED | OUTPATIENT
Start: 2024-03-20 | End: 2024-03-20 | Stop reason: HOSPADM

## 2024-03-20 RX ORDER — ONDANSETRON 2 MG/ML
4 INJECTION INTRAMUSCULAR; INTRAVENOUS ONCE AS NEEDED
Status: DISCONTINUED | OUTPATIENT
Start: 2024-03-20 | End: 2024-03-20 | Stop reason: HOSPADM

## 2024-03-20 RX ORDER — MIDAZOLAM HYDROCHLORIDE 1 MG/ML
2 INJECTION INTRAMUSCULAR; INTRAVENOUS
Status: DISCONTINUED | OUTPATIENT
Start: 2024-03-20 | End: 2024-03-20 | Stop reason: HOSPADM

## 2024-03-20 RX ORDER — SODIUM CHLORIDE 0.9 % (FLUSH) 0.9 %
1-10 SYRINGE (ML) INJECTION AS NEEDED
Status: DISCONTINUED | OUTPATIENT
Start: 2024-03-20 | End: 2024-03-20 | Stop reason: HOSPADM

## 2024-03-20 RX ORDER — TRAMADOL HYDROCHLORIDE 50 MG/1
50 TABLET ORAL EVERY 8 HOURS PRN
Qty: 4 TABLET | Refills: 0 | Status: SHIPPED | OUTPATIENT
Start: 2024-03-20

## 2024-03-20 RX ORDER — SODIUM CHLORIDE 9 MG/ML
40 INJECTION, SOLUTION INTRAVENOUS AS NEEDED
Status: DISCONTINUED | OUTPATIENT
Start: 2024-03-20 | End: 2024-03-20 | Stop reason: HOSPADM

## 2024-03-20 RX ORDER — HYDROCODONE BITARTRATE AND ACETAMINOPHEN 10; 325 MG/1; MG/1
1 TABLET ORAL EVERY 4 HOURS PRN
Status: DISCONTINUED | OUTPATIENT
Start: 2024-03-20 | End: 2024-03-20 | Stop reason: HOSPADM

## 2024-03-20 RX ORDER — SODIUM CHLORIDE, SODIUM LACTATE, POTASSIUM CHLORIDE, CALCIUM CHLORIDE 600; 310; 30; 20 MG/100ML; MG/100ML; MG/100ML; MG/100ML
1000 INJECTION, SOLUTION INTRAVENOUS CONTINUOUS
Status: DISCONTINUED | OUTPATIENT
Start: 2024-03-20 | End: 2024-03-20 | Stop reason: HOSPADM

## 2024-03-20 RX ORDER — HYDROCODONE BITARTRATE AND ACETAMINOPHEN 5; 325 MG/1; MG/1
1 TABLET ORAL EVERY 4 HOURS PRN
Status: DISCONTINUED | OUTPATIENT
Start: 2024-03-20 | End: 2024-03-20 | Stop reason: HOSPADM

## 2024-03-20 RX ORDER — LIDOCAINE HYDROCHLORIDE 20 MG/ML
INJECTION, SOLUTION EPIDURAL; INFILTRATION; INTRACAUDAL; PERINEURAL AS NEEDED
Status: DISCONTINUED | OUTPATIENT
Start: 2024-03-20 | End: 2024-03-20 | Stop reason: SURG

## 2024-03-20 RX ORDER — SODIUM CHLORIDE 0.9 % (FLUSH) 0.9 %
3 SYRINGE (ML) INJECTION AS NEEDED
Status: DISCONTINUED | OUTPATIENT
Start: 2024-03-20 | End: 2024-03-20 | Stop reason: HOSPADM

## 2024-03-20 RX ORDER — SODIUM CHLORIDE 0.9 % (FLUSH) 0.9 %
3 SYRINGE (ML) INJECTION EVERY 12 HOURS SCHEDULED
Status: DISCONTINUED | OUTPATIENT
Start: 2024-03-20 | End: 2024-03-20 | Stop reason: HOSPADM

## 2024-03-20 RX ORDER — LABETALOL HYDROCHLORIDE 5 MG/ML
5 INJECTION, SOLUTION INTRAVENOUS
Status: DISCONTINUED | OUTPATIENT
Start: 2024-03-20 | End: 2024-03-20 | Stop reason: HOSPADM

## 2024-03-20 RX ORDER — FENTANYL CITRATE 50 UG/ML
INJECTION, SOLUTION INTRAMUSCULAR; INTRAVENOUS AS NEEDED
Status: DISCONTINUED | OUTPATIENT
Start: 2024-03-20 | End: 2024-03-20 | Stop reason: SURG

## 2024-03-20 RX ORDER — SODIUM CHLORIDE, SODIUM LACTATE, POTASSIUM CHLORIDE, CALCIUM CHLORIDE 600; 310; 30; 20 MG/100ML; MG/100ML; MG/100ML; MG/100ML
100 INJECTION, SOLUTION INTRAVENOUS CONTINUOUS
Status: DISCONTINUED | OUTPATIENT
Start: 2024-03-20 | End: 2024-03-20 | Stop reason: HOSPADM

## 2024-03-20 RX ORDER — FENTANYL CITRATE 50 UG/ML
50 INJECTION, SOLUTION INTRAMUSCULAR; INTRAVENOUS
Status: DISCONTINUED | OUTPATIENT
Start: 2024-03-20 | End: 2024-03-20 | Stop reason: HOSPADM

## 2024-03-20 RX ORDER — DEXAMETHASONE SODIUM PHOSPHATE 4 MG/ML
4 INJECTION, SOLUTION INTRA-ARTICULAR; INTRALESIONAL; INTRAMUSCULAR; INTRAVENOUS; SOFT TISSUE ONCE AS NEEDED
Status: COMPLETED | OUTPATIENT
Start: 2024-03-20 | End: 2024-03-20

## 2024-03-20 RX ORDER — SODIUM CHLORIDE 0.9 % (FLUSH) 0.9 %
3-10 SYRINGE (ML) INJECTION AS NEEDED
Status: DISCONTINUED | OUTPATIENT
Start: 2024-03-20 | End: 2024-03-20 | Stop reason: HOSPADM

## 2024-03-20 RX ORDER — NALOXONE HCL 0.4 MG/ML
0.4 VIAL (ML) INJECTION AS NEEDED
Status: DISCONTINUED | OUTPATIENT
Start: 2024-03-20 | End: 2024-03-20 | Stop reason: HOSPADM

## 2024-03-20 RX ORDER — FLUMAZENIL 0.1 MG/ML
0.2 INJECTION INTRAVENOUS AS NEEDED
Status: DISCONTINUED | OUTPATIENT
Start: 2024-03-20 | End: 2024-03-20 | Stop reason: HOSPADM

## 2024-03-20 RX ORDER — HYDROCODONE BITARTRATE AND ACETAMINOPHEN 5; 325 MG/1; MG/1
1 TABLET ORAL ONCE AS NEEDED
Status: DISCONTINUED | OUTPATIENT
Start: 2024-03-20 | End: 2024-03-20 | Stop reason: HOSPADM

## 2024-03-20 RX ORDER — SODIUM CHLORIDE 0.9 % (FLUSH) 0.9 %
10 SYRINGE (ML) INJECTION EVERY 12 HOURS SCHEDULED
Status: DISCONTINUED | OUTPATIENT
Start: 2024-03-20 | End: 2024-03-20 | Stop reason: HOSPADM

## 2024-03-20 RX ORDER — ONDANSETRON 2 MG/ML
INJECTION INTRAMUSCULAR; INTRAVENOUS AS NEEDED
Status: DISCONTINUED | OUTPATIENT
Start: 2024-03-20 | End: 2024-03-20 | Stop reason: SURG

## 2024-03-20 RX ADMIN — MIDAZOLAM HYDROCHLORIDE 2 MG: 1 INJECTION, SOLUTION INTRAMUSCULAR; INTRAVENOUS at 08:53

## 2024-03-20 RX ADMIN — PROPOFOL INJECTABLE EMULSION 200 MG: 10 INJECTION, EMULSION INTRAVENOUS at 09:17

## 2024-03-20 RX ADMIN — Medication 200 MCG: at 09:24

## 2024-03-20 RX ADMIN — DEXAMETHASONE SODIUM PHOSPHATE 4 MG: 4 INJECTION INTRA-ARTICULAR; INTRALESIONAL; INTRAMUSCULAR; INTRAVENOUS; SOFT TISSUE at 08:53

## 2024-03-20 RX ADMIN — ONDANSETRON 4 MG: 2 INJECTION INTRAMUSCULAR; INTRAVENOUS at 09:27

## 2024-03-20 RX ADMIN — SODIUM CHLORIDE, POTASSIUM CHLORIDE, SODIUM LACTATE AND CALCIUM CHLORIDE 1000 ML: 600; 310; 30; 20 INJECTION, SOLUTION INTRAVENOUS at 08:31

## 2024-03-20 RX ADMIN — FENTANYL CITRATE 100 MCG: 50 INJECTION, SOLUTION INTRAMUSCULAR; INTRAVENOUS at 09:17

## 2024-03-20 RX ADMIN — LIDOCAINE HYDROCHLORIDE 100 MG: 20 INJECTION, SOLUTION EPIDURAL; INFILTRATION; INTRACAUDAL; PERINEURAL at 09:17

## 2024-03-20 NOTE — OP NOTE
[facility]  Urbano Pineda  : 1995  MRN: [id number]  [facility]: [id number]  Date: 3/20/2024    Operative Note         Pre-op Diagnosis:  Intrauterine contraceptive device threads lost, initial encounter [T83.32XA]   Post-op Diagnosis:  Post-Op Diagnosis Codes:     * Intrauterine contraceptive device threads lost, initial encounter [T83.32XA]   Procedure: Procedure(s):  DILATATION AND CURETTAGE HYSTEROSCOPY, IUD removal   Surgeon: Surgeon(s):  Sean Olguin MD       Anesthesia: General      Estimated Blood Loss: minimal   mls   Fluids: 400   mls   UOP: 50   mls   Drains: None   ABx: None     Specimens:  None   Findings: IUD intact, normal-appearing uterine cavity   Complications: None      INDICATION: Urbano Pineda is a 28 y.o. female  with a Mirena IUD in place.  The strings were not visible on exam and attempts at removal in the office were unsuccessful.  PROCEDURE IN DETAIL: After informed consent was obtained, the patient was taken to the operating room, where general anesthesia was administered. She was placed in the lithotomy position in [facility], and her perineum and vagina were prepped and draped in normal sterile fashion. An open sided speculum was placed in the vagina and her bladder drained with a metal catheter. The anterior lip of the cervix was visualized and grasped with a single-tooth tenaculum.  The cervix was dilated with Hegar dilators to a #8, and to accept a diagnostic hysteroscope.  Normal saline was used for distention and the cavity was inspected showing no abnormal findings, the IUD in appropriate position.  The tubal ostia were both identified.  The hysteroscope was removed.  Polyp forceps were then used to grasp and remove the IUD in 1 attempt.  The cervix was then observed and no active bleeding was noted.  The tenaculum was removed and the cervix was hemostatic.  All instruments were then removed from the vagina.   The patient was then awakened and taken to the  recovery room in stable condition.  She tolerated the procedure well and without complications.  All sponge needle and instrument counts were correct times 3 per the OR staff.    Sean Olguin MD   3/20/2024  09:32 CDT

## 2024-03-20 NOTE — ANESTHESIA PROCEDURE NOTES
Airway  Urgency: elective    Date/Time: 3/20/2024 9:18 AM  Airway not difficult    General Information and Staff    Patient location during procedure: OR  CRNA/CAA: Leo Stock CRNA    Indications and Patient Condition  Indications for airway management: airway protection    Preoxygenated: yes  Mask difficulty assessment: 1 - vent by mask    Final Airway Details  Final airway type: supraglottic airway      Successful airway: flexible  Size 4     Number of attempts at approach: 1  Assessment: lips, teeth, and gum same as pre-op

## 2024-03-20 NOTE — ANESTHESIA PREPROCEDURE EVALUATION
Anesthesia Evaluation     Patient summary reviewed   no history of anesthetic complications:   NPO Solid Status: > 8 hours  NPO Liquid Status: > 8 hours           Airway   Mallampati: I  TM distance: >3 FB  No difficulty expected  Dental - normal exam     Pulmonary    (+) a smoker Current, vape,  Cardiovascular - negative cardio ROS  Exercise tolerance: excellent (>7 METS)        Neuro/Psych- negative ROS  (-) seizures, TIA, CVA  GI/Hepatic/Renal/Endo - negative ROS     Musculoskeletal     Abdominal    Substance History      OB/GYN          Other                    Anesthesia Plan    ASA 2     general     intravenous induction     Anesthetic plan, risks, benefits, and alternatives have been provided, discussed and informed consent has been obtained with: patient.    CODE STATUS:

## 2024-03-20 NOTE — ANESTHESIA POSTPROCEDURE EVALUATION
Patient: Urbano Pineda    Procedure Summary       Date: 03/20/24 Room / Location: [facility] OR  /  [facility] OR    Anesthesia Start: 0913 Anesthesia Stop:     Procedures:       DILATATION AND CURETTAGE HYSTEROSCOPY, IUD removal (Uterus)      IUD removal (Vagina) Diagnosis:       Intrauterine contraceptive device threads lost, initial encounter      (Intrauterine contraceptive device threads lost, initial encounter [T83.32XA])    Surgeons: Sean Olguin MD Provider: Leo Stock CRNA    Anesthesia Type: general ASA Status: 2            Anesthesia Type: general    Vitals  Vitals Value Taken Time   BP 98/63 03/20/24 1010   Temp 97.5 °F (36.4 °C) 03/20/24 1018   Pulse 80 03/20/24 1018   Resp 12 03/20/24 1010   SpO2 100 % 03/20/24 1018           Post Anesthesia Care and Evaluation    Patient location during evaluation: PACU  Patient participation: complete - patient participated  Level of consciousness: awake and awake and alert  Pain score: 0  Pain management: adequate    Airway patency: patent  Anesthetic complications: No anesthetic complications  PONV Status: none  Cardiovascular status: acceptable  Respiratory status: acceptable  Hydration status: acceptable    Comments: Patient discharged according to acceptable Obie score per RN assessment. See nursing records for further information.     Blood pressure 94/44, pulse 74, temperature 97.5 °F (36.4 °C), resp. rate 14, SpO2 100%, not currently breastfeeding.

## 2024-04-22 ENCOUNTER — OFFICE VISIT (OUTPATIENT)
Age: 29
End: 2024-04-22
Payer: COMMERCIAL

## 2024-04-22 VITALS — BODY MASS INDEX: 24.07 KG/M2 | DIASTOLIC BLOOD PRESSURE: 74 MMHG | SYSTOLIC BLOOD PRESSURE: 104 MMHG | WEIGHT: 141 LBS

## 2024-04-22 DIAGNOSIS — Z09 POSTOP CHECK: ICD-10-CM

## 2024-04-22 DIAGNOSIS — T83.32XA INTRAUTERINE CONTRACEPTIVE DEVICE THREADS LOST, INITIAL ENCOUNTER: Primary | ICD-10-CM

## 2024-04-22 PROCEDURE — 99212 OFFICE O/P EST SF 10 MIN: CPT | Performed by: OBSTETRICS & GYNECOLOGY

## 2024-04-22 RX ORDER — ATOMOXETINE 40 MG/1
1 CAPSULE ORAL DAILY
COMMUNITY
Start: 2024-04-04

## 2024-04-22 NOTE — PROGRESS NOTES
Urbano Pineda is a 28 y.o. female here today for follow-up of lost IUD strings, and a postop visit after undergoing hysteroscopy with IUD removal on March 20.  She has been doing well postoperatively since her procedure and denies any fevers, pain, or significant vaginal bleeding.  She plans on using condoms for contraception for now.    /74 (BP Location: Left arm, Patient Position: Sitting)   Wt 64 kg (141 lb)   LMP 04/03/2024 (Approximate)   BMI 24.07 kg/m²    In general pleasant female in no acute distress  Abdomen is soft nontender nondistended    Last Pap smear May 2022 normal    Assessment: Lost IUD strings, normal postoperative visit after hysteroscopy with IUD removal    Urbano Pineda will return in 1 year for annual exam.  She will contact the office if she desires hormonal contraception.  In the meantime if she has questions or problems she will call the office.

## 2024-07-16 ENCOUNTER — OFFICE VISIT (OUTPATIENT)
Dept: INTERNAL MEDICINE | Facility: CLINIC | Age: 29
End: 2024-07-16
Payer: COMMERCIAL

## 2024-07-16 VITALS
BODY MASS INDEX: 26.63 KG/M2 | HEART RATE: 91 BPM | TEMPERATURE: 98.1 F | OXYGEN SATURATION: 99 % | WEIGHT: 156 LBS | DIASTOLIC BLOOD PRESSURE: 71 MMHG | SYSTOLIC BLOOD PRESSURE: 104 MMHG | RESPIRATION RATE: 19 BRPM | HEIGHT: 64 IN

## 2024-07-16 DIAGNOSIS — Z00.00 ROUTINE GENERAL MEDICAL EXAMINATION AT A HEALTH CARE FACILITY: Primary | ICD-10-CM

## 2024-07-16 DIAGNOSIS — E55.9 VITAMIN D DEFICIENCY: ICD-10-CM

## 2024-07-16 PROCEDURE — 2014F MENTAL STATUS ASSESS: CPT | Performed by: NURSE PRACTITIONER

## 2024-07-16 PROCEDURE — 99395 PREV VISIT EST AGE 18-39: CPT | Performed by: NURSE PRACTITIONER

## 2024-07-16 PROCEDURE — 1160F RVW MEDS BY RX/DR IN RCRD: CPT | Performed by: NURSE PRACTITIONER

## 2024-07-16 PROCEDURE — 1159F MED LIST DOCD IN RCRD: CPT | Performed by: NURSE PRACTITIONER

## 2024-07-16 RX ORDER — LISDEXAMFETAMINE DIMESYLATE 30 MG/1
1 CAPSULE ORAL DAILY
COMMUNITY
Start: 2024-07-04

## 2024-07-16 RX ORDER — ATOMOXETINE 80 MG/1
1 CAPSULE ORAL DAILY
COMMUNITY
Start: 2024-04-27 | End: 2024-07-16

## 2024-07-16 NOTE — PROGRESS NOTES
Subjective     Chief Complaint   Patient presents with    Annual Exam       History of Present Illness      Pt comes in today for her annual exam. She is following with Dr. Gomez for her psychiatry care. Pt denies any chest pain or shortness of breath. No nausea or vomiting. No bowel or bladder dysfunction. The breast knot disappeared and she did not have the mammogram. No headaches or dizziness. She feels like she is gaining weight. She has gained 15 pounds in 3 months.     Otherwise complete ROS reviewed and negative except as mentioned in the HPI.    Past Medical History:   Past Medical History:   Diagnosis Date    Depression     Spon  w/o complication      Past Surgical History:  Past Surgical History:   Procedure Laterality Date    D & C HYSTEROSCOPY N/A 3/20/2024    Procedure: DILATATION AND CURETTAGE HYSTEROSCOPY, IUD removal;  Surgeon: Sean Olguin MD;  Location: [facility];  Service: Obstetrics/Gynecology;  Laterality: N/A;    WISDOM TOOTH EXTRACTION       Social History:  reports that she quit smoking about 3 years ago. Her smoking use included cigarettes. She started smoking about 5 years ago. She has a 1 pack-year smoking history. She has been exposed to tobacco smoke. She has never used smokeless tobacco. She reports current alcohol use. She reports that she does not use drugs.    Family History: family history includes Breast cancer in her maternal aunt, maternal aunt, maternal grandmother, maternal great-grandmother, and paternal aunt; Cervical cancer in her maternal grandmother; No Known Problems in her brother, father, mother, paternal grandfather, paternal grandmother, and sister.       Allergies:  Allergies   Allergen Reactions    Cephalexin Other (See Comments)     Passed out     Medications:  Prior to Admission medications    Medication Sig Start Date End Date Taking? Authorizing Provider   atomoxetine (STRATTERA) 80 MG capsule Take 1 capsule by mouth Daily. 24  Yes  "Wilver Daniel MD   Vyvanse 30 MG capsule Take 1 capsule by mouth Daily 7/4/24  Yes Wilver Daniel MD   Cariprazine HCl (Vraylar) 1.5 MG capsule capsule Take 1 capsule by mouth Daily.    Wilver Daniel MD   Dextromethorphan-buPROPion ER (Auvelity)  MG tablet controlled-release Take 1 tablet by mouth 2 (Two) Times a Day. 2/16/24   Clare Gabriel APRN   atomoxetine (STRATTERA) 40 MG capsule Take 1 capsule by mouth Daily. 4/4/24 7/16/24  Wilver Daniel MD       Objective     Vital Signs: /71   Pulse 91   Temp 98.1 °F (36.7 °C)   Resp 19   Ht 163 cm (64.17\")   Wt 70.8 kg (156 lb)   SpO2 99%   BMI 26.64 kg/m²     Physical Exam    Physical Exam  Vitals reviewed.   Constitutional:       Appearance: Normal appearance. She is well-developed.   HENT:      Head: Normocephalic and atraumatic.      Right Ear: Tympanic membrane normal.      Left Ear: Tympanic membrane normal.      Mouth/Throat:      Mouth: Mucous membranes are moist.      Pharynx: No oropharyngeal exudate or posterior oropharyngeal erythema.   Eyes:      Pupils: Pupils are equal, round, and reactive to light.   Neck:      Vascular: No JVD.   Cardiovascular:      Rate and Rhythm: Normal rate and regular rhythm.   Pulmonary:      Effort: Pulmonary effort is normal.      Breath sounds: Normal breath sounds.   Abdominal:      General: Bowel sounds are normal.      Palpations: Abdomen is soft.   Musculoskeletal:         General: No deformity. Normal range of motion.      Cervical back: Normal range of motion and neck supple.   Lymphadenopathy:      Cervical: No cervical adenopathy.   Skin:     General: Skin is warm and dry.   Neurological:      General: No focal deficit present.      Mental Status: She is alert and oriented to person, place, and time.   Psychiatric:         Behavior: Behavior normal.         Thought Content: Thought content normal.         Judgment: Judgment normal.       BMI is >= 25 and <30. " (Overweight) The following options were offered after discussion;: exercise counseling/recommendations and nutrition counseling/recommendations      Results Reviewed:  Glucose   Date Value Ref Range Status   03/13/2024 80 65 - 99 mg/dL Final     BUN   Date Value Ref Range Status   03/13/2024 10 6 - 20 mg/dL Final     Creatinine   Date Value Ref Range Status   03/13/2024 0.63 0.57 - 1.00 mg/dL Final     Sodium   Date Value Ref Range Status   03/13/2024 140 136 - 145 mmol/L Final     Potassium   Date Value Ref Range Status   03/13/2024 3.6 3.5 - 5.2 mmol/L Final     Chloride   Date Value Ref Range Status   03/13/2024 105 98 - 107 mmol/L Final     CO2   Date Value Ref Range Status   03/13/2024 27.0 22.0 - 29.0 mmol/L Final     Calcium   Date Value Ref Range Status   03/13/2024 8.3 (L) 8.6 - 10.5 mg/dL Final     ALT (SGPT)   Date Value Ref Range Status   06/06/2023 11 0 - 32 IU/L Final   04/16/2020 19 0 - 35 U/L Final     AST (SGOT)   Date Value Ref Range Status   06/06/2023 17 0 - 40 IU/L Final   04/16/2020 21 7 - 45 U/L Final     WBC   Date Value Ref Range Status   03/13/2024 5.95 3.40 - 10.80 10*3/mm3 Final   06/06/2023 4.7 3.4 - 10.8 x10E3/uL Final     Hematocrit   Date Value Ref Range Status   03/13/2024 38.6 34.0 - 46.6 % Final     Platelets   Date Value Ref Range Status   03/13/2024 261 140 - 450 10*3/mm3 Final     Triglycerides   Date Value Ref Range Status   06/06/2023 63 0 - 149 mg/dL Final     HDL Cholesterol   Date Value Ref Range Status   06/06/2023 43 >39 mg/dL Final     LDL Chol Calc (NIH)   Date Value Ref Range Status   06/06/2023 95 0 - 99 mg/dL Final     Hemoglobin A1C   Date Value Ref Range Status   06/06/2023 4.9 4.8 - 5.6 % Final     Comment:              Prediabetes: 5.7 - 6.4           Diabetes: >6.4           Glycemic control for adults with diabetes: <7.0         Results        Assessment / Plan     Assessment/Plan:  Diagnoses and all orders for this visit:    1. Routine general medical  examination at a health care facility (Primary)  -     CBC & Differential  -     Comprehensive Metabolic Panel  -     Lipid Panel  -     TSH  -     Vitamin B12  -     Hemoglobin A1c  -     T4, Free    2. Vitamin D deficiency  -     Vitamin D,25-Hydroxy    Will have lab work here today. Discussed weight management and importance of maintaining a healthy weight. Discussed Vitamin D intake and the importance of adequate vitamin D for both Bone Health and a healthy immune system.  Discussed Daily exercise and the importance of same, in regards to a healthy heart as well as helping to maintain her weight and improving her mental health.  BMI is normal.   Assessment & Plan            No follow-ups on file. unless patient needs to be seen sooner or acute issues arise.    Code Status: Full.     I have discussed the patient results/orders and and plan/recommendation with them at today's visit.      Signed by:    AYALA Majano Date: 07/16/24

## 2024-07-18 LAB
25(OH)D3+25(OH)D2 SERPL-MCNC: 22.4 NG/ML (ref 30–100)
ALBUMIN SERPL-MCNC: 4.6 G/DL (ref 4–5)
ALP SERPL-CCNC: 62 IU/L (ref 44–121)
ALT SERPL-CCNC: 14 IU/L (ref 0–32)
AST SERPL-CCNC: 15 IU/L (ref 0–40)
BASOPHILS # BLD AUTO: 0.1 X10E3/UL (ref 0–0.2)
BASOPHILS NFR BLD AUTO: 1 %
BILIRUB SERPL-MCNC: 0.2 MG/DL (ref 0–1.2)
BUN SERPL-MCNC: 13 MG/DL (ref 6–20)
BUN/CREAT SERPL: 17 (ref 9–23)
CALCIUM SERPL-MCNC: 9 MG/DL (ref 8.7–10.2)
CHLORIDE SERPL-SCNC: 104 MMOL/L (ref 96–106)
CHOLEST SERPL-MCNC: 172 MG/DL (ref 100–199)
CO2 SERPL-SCNC: 25 MMOL/L (ref 20–29)
CREAT SERPL-MCNC: 0.75 MG/DL (ref 0.57–1)
EGFRCR SERPLBLD CKD-EPI 2021: 112 ML/MIN/1.73
EOSINOPHIL # BLD AUTO: 0.1 X10E3/UL (ref 0–0.4)
EOSINOPHIL NFR BLD AUTO: 2 %
ERYTHROCYTE [DISTWIDTH] IN BLOOD BY AUTOMATED COUNT: 12.3 % (ref 11.7–15.4)
GLOBULIN SER CALC-MCNC: 1.9 G/DL (ref 1.5–4.5)
GLUCOSE SERPL-MCNC: 68 MG/DL (ref 70–99)
HBA1C MFR BLD: 4.9 % (ref 4.8–5.6)
HCT VFR BLD AUTO: 43 % (ref 34–46.6)
HDLC SERPL-MCNC: 50 MG/DL
HGB BLD-MCNC: 13.7 G/DL (ref 11.1–15.9)
IMM GRANULOCYTES # BLD AUTO: 0 X10E3/UL (ref 0–0.1)
IMM GRANULOCYTES NFR BLD AUTO: 0 %
LDLC SERPL CALC-MCNC: 112 MG/DL (ref 0–99)
LYMPHOCYTES # BLD AUTO: 2.1 X10E3/UL (ref 0.7–3.1)
LYMPHOCYTES NFR BLD AUTO: 33 %
MCH RBC QN AUTO: 29.7 PG (ref 26.6–33)
MCHC RBC AUTO-ENTMCNC: 31.9 G/DL (ref 31.5–35.7)
MCV RBC AUTO: 93 FL (ref 79–97)
MONOCYTES # BLD AUTO: 0.4 X10E3/UL (ref 0.1–0.9)
MONOCYTES NFR BLD AUTO: 6 %
NEUTROPHILS # BLD AUTO: 3.8 X10E3/UL (ref 1.4–7)
NEUTROPHILS NFR BLD AUTO: 58 %
PLATELET # BLD AUTO: 274 X10E3/UL (ref 150–450)
POTASSIUM SERPL-SCNC: 4.2 MMOL/L (ref 3.5–5.2)
PROT SERPL-MCNC: 6.5 G/DL (ref 6–8.5)
RBC # BLD AUTO: 4.61 X10E6/UL (ref 3.77–5.28)
SODIUM SERPL-SCNC: 141 MMOL/L (ref 134–144)
T4 FREE SERPL-MCNC: 1.33 NG/DL (ref 0.82–1.77)
TRIGL SERPL-MCNC: 51 MG/DL (ref 0–149)
TSH SERPL DL<=0.005 MIU/L-ACNC: 1.73 UIU/ML (ref 0.45–4.5)
VIT B12 SERPL-MCNC: 585 PG/ML (ref 232–1245)
VLDLC SERPL CALC-MCNC: 10 MG/DL (ref 5–40)
WBC # BLD AUTO: 6.5 X10E3/UL (ref 3.4–10.8)

## 2024-07-23 PROCEDURE — 0202U NFCT DS 22 TRGT SARS-COV-2: CPT | Performed by: NURSE PRACTITIONER

## 2024-07-24 ENCOUNTER — TELEPHONE (OUTPATIENT)
Dept: URGENT CARE | Facility: CLINIC | Age: 29
End: 2024-07-24
Payer: COMMERCIAL

## 2024-07-24 NOTE — TELEPHONE ENCOUNTER
----- Message from Nicole Carrillo sent at 7/24/2024  6:43 AM CDT -----  As it for human rhinovirus this is a cold is self-limiting follow-up PCP if symptoms persist or worsen

## 2024-10-30 ENCOUNTER — PRIOR AUTHORIZATION (OUTPATIENT)
Dept: INTERNAL MEDICINE | Facility: CLINIC | Age: 29
End: 2024-10-30
Payer: COMMERCIAL

## 2024-10-30 NOTE — TELEPHONE ENCOUNTER
----- Message from Jami Merida sent at 4/11/2019 10:08 AM CDT -----  Contact: David SENA  Type:  Sooner Apoointment Request    Caller is requesting a sooner appointment.  Caller declined first available appointment listed below.  Caller will not accept being placed on the waitlist and is requesting a message be sent to doctor.  Name of Caller: David SENA [facility]   When is the first available appointment? Unable to see schedule  Symptoms: pt needing clearance for surgery.   Would the patient rather a call back or a response via [facility]? Call back  Best Call Back Number: [phone]  Additional Information: n/a    Thanks,   Jami Merida         Urbano Pineda (Key: [id number])  Auvelity 45-105MG er tablets  Message from Plan  The request has been approved. The authorization is effective from 10/30/2024 to 10/30/2025, as long as the member is enrolled in their current health plan. A written notification letter will follow with additional details.. Authorization Expiration Date: October 30, 2025.

## 2024-11-07 DIAGNOSIS — Z11.1 SCREENING-PULMONARY TB: Primary | ICD-10-CM

## 2024-11-08 ENCOUNTER — LAB (OUTPATIENT)
Dept: INTERNAL MEDICINE | Facility: CLINIC | Age: 29
End: 2024-11-08
Payer: COMMERCIAL

## 2024-11-08 DIAGNOSIS — Z11.1 SCREENING-PULMONARY TB: ICD-10-CM

## 2024-11-12 LAB
GAMMA INTERFERON BACKGROUND BLD IA-ACNC: 0.08 IU/ML
M TB IFN-G BLD-IMP: NEGATIVE
M TB IFN-G CD4+ BCKGRND COR BLD-ACNC: 0.14 IU/ML
M TB IFN-G CD4+CD8+ BCKGRND COR BLD-ACNC: 0.12 IU/ML
MITOGEN IGNF BCKGRD COR BLD-ACNC: >10 IU/ML
QUANTIFERON INCUBATION: NORMAL
SERVICE CMNT-IMP: NORMAL

## (undated) DEVICE — GOWN SURG ORBIS LVL3 X/LNG XL STRL

## (undated) DEVICE — CVR HNDL LIGHT RIGID

## (undated) DEVICE — DRAPE,LITHOTOMY,STERILE: Brand: MEDLINE

## (undated) DEVICE — BAPTIST TURNOVER KIT: Brand: MEDLINE INDUSTRIES, INC.

## (undated) DEVICE — Device

## (undated) DEVICE — TUBING, SUCTION, 1/4" X 12', STRAIGHT: Brand: MEDLINE

## (undated) DEVICE — PACK,SET UP,NO DRAPES: Brand: MEDLINE

## (undated) DEVICE — CYSTO/BLADDER IRRIGATION SET, REGULATING CLAMP

## (undated) DEVICE — GAUZE,SPONGE,4"X4",16PLY,XRAY,STRL,LF: Brand: MEDLINE

## (undated) DEVICE — PAD,PREPPING,CUFFED,24X48,7",NONSTERILE: Brand: MEDLINE

## (undated) DEVICE — GOWN,NON-REINFORCED,SIRUS,SET IN SLV,XL: Brand: MEDLINE

## (undated) DEVICE — PAD D&C: Brand: MEDLINE INDUSTRIES, INC.

## (undated) DEVICE — TOWEL,OR,DSP,ST,BLUE,STD,4/PK,20PK/CS: Brand: MEDLINE

## (undated) DEVICE — VAGINAL PREP TRAY: Brand: MEDLINE INDUSTRIES, INC.

## (undated) DEVICE — PAD,NON-ADHERENT,3X8,STERILE,LF,1/PK: Brand: MEDLINE

## (undated) DEVICE — GLV SURG BIOGEL M LTX PF 8